# Patient Record
Sex: FEMALE | Race: WHITE | Employment: OTHER | URBAN - METROPOLITAN AREA
[De-identification: names, ages, dates, MRNs, and addresses within clinical notes are randomized per-mention and may not be internally consistent; named-entity substitution may affect disease eponyms.]

---

## 2018-05-07 ENCOUNTER — HOSPITAL ENCOUNTER (EMERGENCY)
Age: 68
Discharge: HOME | End: 2018-05-07
Attending: EMERGENCY MEDICINE
Payer: MEDICARE

## 2018-05-07 VITALS
SYSTOLIC BLOOD PRESSURE: 197 MMHG | OXYGEN SATURATION: 96 % | TEMPERATURE: 98 F | HEART RATE: 73 BPM | WEIGHT: 185 LBS | HEIGHT: 62 IN | BODY MASS INDEX: 34.04 KG/M2 | RESPIRATION RATE: 16 BRPM | DIASTOLIC BLOOD PRESSURE: 82 MMHG

## 2018-05-07 DIAGNOSIS — R04.0 EPISTAXIS: Primary | ICD-10-CM

## 2018-05-07 PROCEDURE — 2500000003 HC RX 250 WO HCPCS: Performed by: EMERGENCY MEDICINE

## 2018-05-07 PROCEDURE — 6370000000 HC RX 637 (ALT 250 FOR IP): Performed by: EMERGENCY MEDICINE

## 2018-05-07 PROCEDURE — 99283 EMERGENCY DEPT VISIT LOW MDM: CPT

## 2018-05-07 RX ORDER — LIDOCAINE HYDROCHLORIDE 20 MG/ML
5 INJECTION, SOLUTION INFILTRATION; PERINEURAL ONCE
Status: COMPLETED | OUTPATIENT
Start: 2018-05-07 | End: 2018-05-07

## 2018-05-07 RX ORDER — OXYMETAZOLINE HYDROCHLORIDE 0.05 G/100ML
2 SPRAY NASAL ONCE
Status: COMPLETED | OUTPATIENT
Start: 2018-05-07 | End: 2018-05-07

## 2018-05-07 RX ADMIN — OXYMETAZOLINE HYDROCHLORIDE 2 SPRAY: 5 SPRAY NASAL at 20:22

## 2018-05-07 RX ADMIN — LIDOCAINE HYDROCHLORIDE 5 ML: 20 INJECTION, SOLUTION INFILTRATION; PERINEURAL at 20:21

## 2018-05-07 ASSESSMENT — ENCOUNTER SYMPTOMS
COUGH: 0
VOMITING: 0
BACK PAIN: 0
NAUSEA: 0
SHORTNESS OF BREATH: 0
BLOOD IN STOOL: 0
ABDOMINAL PAIN: 0

## 2018-05-07 ASSESSMENT — PAIN SCALES - GENERAL: PAINLEVEL_OUTOF10: 0

## 2022-08-30 VITALS — HEIGHT: 63 IN | BODY MASS INDEX: 25.69 KG/M2 | WEIGHT: 145 LBS

## 2022-08-30 RX ORDER — METOPROLOL TARTRATE 100 MG/1
100 TABLET ORAL DAILY
COMMUNITY

## 2022-08-30 RX ORDER — ERGOCALCIFEROL 1.25 MG/1
50000 CAPSULE ORAL
COMMUNITY

## 2022-08-30 RX ORDER — POTASSIUM BICARBONATE 25 MEQ/1
25 TABLET, EFFERVESCENT ORAL
COMMUNITY
End: 2022-09-01

## 2022-08-30 RX ORDER — TRIAMTERENE AND HYDROCHLOROTHIAZIDE 37.5; 25 MG/1; MG/1
1 CAPSULE ORAL
COMMUNITY

## 2022-08-31 ENCOUNTER — TELEPHONE (OUTPATIENT)
Dept: PRIMARY CARE CLINIC | Age: 72
End: 2022-08-31

## 2022-08-31 PROBLEM — E55.9 VITAMIN D DEFICIENCY: Status: ACTIVE | Noted: 2022-06-15

## 2022-08-31 PROBLEM — F03.918 DEMENTIA WITH BEHAVIORAL DISTURBANCE: Status: ACTIVE | Noted: 2022-01-31

## 2022-08-31 PROBLEM — R63.4 UNINTENTIONAL WEIGHT LOSS: Status: ACTIVE | Noted: 2022-08-31

## 2022-08-31 NOTE — PROGRESS NOTES
2022    Home visit medically necessary in lieu of an office visit due to: Has dementia, won't leave house to go to doctor's office, difficult to get out. Seen with  Sam Rg. HPI:  Jimena Rodriguez (:  1950) is a 80 y.o. female, who is seen today for home medical care evaluation and has Dementia with behavioral disturbance ([address] Primary hypertension; Vitamin D deficiency; and Unintentional weight loss on their problem list.  Patient lives with her  at their home. She has had rapidly progressive memory loss since January of this year. She has not been able to carry out normal IADLs. She has gotten lost while driving. She told her neighbor that there was a strange man in her house and it was her . She will not bath or change her clothes for long periods of time. She won't eat for days at a time and has lost 40 lbs in last 6 months. She is able to converse freely but does not remember her age or where she lives. She does not remember what type of work she did. Her  says that she cooperates for the most part taking her meds. She gets easily agitated and can become very difficult to control. REVIEW OF SYSTEMS:    GENERAL: Appetite POOR. WEIGHT LOSS OF 40 LBS , generally healthy, DECLINING strength AND exercise tolerance. SKIN:  No rash, itching, lesions, ecchymosis, erythema or ulcers. HEAD: No headaches, no lightheadedness, no injury. EYES: Normal vision, no diplopia, no tearing, no blind spots, no pain. EARS: No change in hearing, no tinnitus, no bleeding, no vertigo. NOSE: No epistaxis, no coryza, no obstruction, no discharge. MOUTH: No dental difficulties, no gingival bleeding, no use of dentures. NECK: No stiffness, no pain, no tenderness, no noted masses. CARDIOVASCULAR: No edema, no chest pains, no murmurs, no palpitations, no syncope, no orthopnea.       RESPIRATORY: No shortness of breath, no pain with breathing, no wheezing, no hemoptysis, no cough, no respiratory infections, no TB, no fevers or night sweats. BREASTS:  No lumps, discharge or pain. GASTROINTESTINAL: No change in appetite, no dysphagia, no heartburn, no abdominal pains, no constipation or diarrhea, no bowel habit changes, no emesis, no melena, no hemorrhoids. GENITOURINARY: No incontinence or retention, no urinary urgency, no nocturia, no frequent UTIs, no dysuria, no change in nature of urine. (Female) No post-menopausal bleeding, no discharge, no itching. MUSCULOSKELETAL: No pain in muscles or joints, no swelling or redness of joints, no limitation of range of motion, no weakness or numbness. NEURO/PSYCH: No weakness, no tremor, no seizures, no ataxia. No changes in sleep habits. MEMORY LOSS, CONFUSION, AGITATION, DEPRESSION, ANXIETY. ALLERGIC/IMMUNOLOGIC/ENDOCRINE:  No reactions to drugs, foods or  insects. No anemia, no bleeding tendency, no transfusions. Allergies   Allergen Reactions    Penicillins Rash     Past Medical History:   Diagnosis Date    Hypertension      Past Surgical History:   Procedure Laterality Date    CHOLECYSTECTOMY      HYSTERECTOMY (CERVIX STATUS UNKNOWN)      SKIN BIOPSY       Social History     Socioeconomic History    Marital status:      Spouse name: Berenice Low    Number of children: 0    Highest education level: H.S.   Occupational History       Tobacco Use    Smoking status: Never    Smokeless tobacco: Never   Substance and Sexual Activity    Alcohol use: No    Drug use: No    Sexual activity: Not on file     No family history on file.     PHYSICAL EXAM:   Vitals:    09/01/22 1005   BP: 138/79   Site: Left Wrist   Position: Sitting   Pulse: 57   Resp: 18   Temp: (!) 96.4 °F (35.8 °C)   TempSrc: Infrared   SpO2: Comment: unable fingers too cold   Weight: 133 lb (60.3 kg)   Height: 5' 3\" (1.6 m)     Estimated body mass index is 23.56 kg/m² as calculated from the following:    Height as of this encounter: 5' 3\" (1.6 m). Weight as of this encounter: 133 lb (60.3 kg). GEN:  elderly WDWN female patient seated in chair in NAD. HEAD:  atraumatic, normocephalic. EYES:  EOMI, PERRL, no cataracts, conjunctivae appear normal. ENT: Good hearing, EACs without wax, TM's normal, nasal septum midline, no significant congestion, oral cavity without lesions, good dentition, no dentures. NECK:  fair-good ROM, no palpable masses, no carotid bruits, no JVD. LUNGS:  clear to ausc, no rales, rhonchi or wheezes. HEART:  RRR, no murmurs or gallops. ABD:  soft, non-tender to palp, no palp masses or HSM, normal BS. BACK:  no scoliosis or kyphosis, non-tender to palp. EXTREMITIES:  No edema, no ulcerations, varicosities or erythema. No gross deformities. MUSCULOSKELETAL:  Good ROM of all joints, non tender to palp and or with movement. SKIN:  No ulcerations or breakdown, rash, ecchymosis, or other lesions. NEURO:   No tremor, motor UEs 5/5 LEs  5/5, sensory normal, able to stand and walk without aid. PSYCH:  Pleasant and cooperative. Fluid speech, oriented to person only. Remembers 0 out of 3 items. No delusional statements. Medications reviewed. Labs    ASSESSMENT:  Elderly female has Dementia with behavioral disturbance ([address] Primary hypertension; Vitamin D deficiency; and Unintentional weight loss on their problem list.     Diagnoses attached to this encounter:  Jimena was seen today for hypertension and dementia. Diagnoses and all orders for this visit:    Dementia with behavioral disturbance, unspecified dementia type ([address]    Primary hypertension    Vitamin D deficiency    Unintentional weight loss    Other orders  -     donepezil (ARICEPT) 5 MG tablet; Take 1 tablet by mouth nightly  -     mirtazapine (REMERON) 7.5 MG tablet; Take 1 tablet by mouth nightly     PLAN:  Send for records and labs from Dr. Saintclair Saddler. Start Aricept at HS. Start mirtazapine for appetite.  Recommended her  go to [facility] in [place] for information on disease. Continue other meds as per Rx List. Recheck 1 month. 75  minutes spent on visit, 40 minutes involved education/counseling regarding dementia, HTN, weight loss disease processes, treatment options, meds and coordination of care. Current Outpatient Medications   Medication Sig Dispense Refill    potassium bicarbonate (KLOR-CON/EF) 25 MEQ disintegrating tablet Take 25 mEq by mouth three times a week      donepezil (ARICEPT) 5 MG tablet Take 1 tablet by mouth nightly 30 tablet 1    mirtazapine (REMERON) 7.5 MG tablet Take 1 tablet by mouth nightly 30 tablet 5    triamterene-hydroCHLOROthiazide (DYAZIDE) 37.5-25 MG per capsule Take 1 capsule by mouth three times a week      vitamin D (ERGOCALCIFEROL) 1.25 MG (00990 UT) CAPS capsule Take 50,000 Units by mouth once a week      metoprolol (LOPRESSOR) 100 MG tablet Take 100 mg by mouth daily       No current facility-administered medications for this visit. Return in about 1 month (around 10/1/2022). An  electronic signature was used to authenticate this note.     --Princess Sarah MD on 9/1/2022 at 6:13 PM

## 2022-09-01 ENCOUNTER — OFFICE VISIT (OUTPATIENT)
Dept: PRIMARY CARE CLINIC | Age: 72
End: 2022-09-01
Payer: MEDICARE

## 2022-09-01 VITALS
DIASTOLIC BLOOD PRESSURE: 79 MMHG | BODY MASS INDEX: 23.57 KG/M2 | SYSTOLIC BLOOD PRESSURE: 138 MMHG | TEMPERATURE: 96.4 F | HEART RATE: 57 BPM | WEIGHT: 133 LBS | RESPIRATION RATE: 18 BRPM | HEIGHT: 63 IN

## 2022-09-01 DIAGNOSIS — I10 PRIMARY HYPERTENSION: ICD-10-CM

## 2022-09-01 DIAGNOSIS — R63.4 UNINTENTIONAL WEIGHT LOSS: ICD-10-CM

## 2022-09-01 DIAGNOSIS — F03.91 DEMENTIA WITH BEHAVIORAL DISTURBANCE, UNSPECIFIED DEMENTIA TYPE: Primary | ICD-10-CM

## 2022-09-01 DIAGNOSIS — E55.9 VITAMIN D DEFICIENCY: ICD-10-CM

## 2022-09-01 PROCEDURE — 3017F COLORECTAL CA SCREEN DOC REV: CPT | Performed by: FAMILY MEDICINE

## 2022-09-01 PROCEDURE — 1090F PRES/ABSN URINE INCON ASSESS: CPT | Performed by: FAMILY MEDICINE

## 2022-09-01 PROCEDURE — G8420 CALC BMI NORM PARAMETERS: HCPCS | Performed by: FAMILY MEDICINE

## 2022-09-01 PROCEDURE — 1036F TOBACCO NON-USER: CPT | Performed by: FAMILY MEDICINE

## 2022-09-01 PROCEDURE — 1123F ACP DISCUSS/DSCN MKR DOCD: CPT | Performed by: FAMILY MEDICINE

## 2022-09-01 PROCEDURE — 99345 HOME/RES VST NEW HIGH MDM 75: CPT | Performed by: FAMILY MEDICINE

## 2022-09-01 RX ORDER — DONEPEZIL HYDROCHLORIDE 5 MG/1
5 TABLET, FILM COATED ORAL NIGHTLY
Qty: 30 TABLET | Refills: 1 | Status: SHIPPED
Start: 2022-09-01 | End: 2022-10-03 | Stop reason: DRUGHIGH

## 2022-09-01 RX ORDER — MIRTAZAPINE 7.5 MG/1
7.5 TABLET, FILM COATED ORAL NIGHTLY
Qty: 30 TABLET | Refills: 5 | Status: SHIPPED | OUTPATIENT
Start: 2022-09-01

## 2022-09-01 RX ORDER — POTASSIUM BICARBONATE 25 MEQ/1
25 TABLET, EFFERVESCENT ORAL
COMMUNITY

## 2022-09-01 SDOH — ECONOMIC STABILITY: FOOD INSECURITY: WITHIN THE PAST 12 MONTHS, THE FOOD YOU BOUGHT JUST DIDN'T LAST AND YOU DIDN'T HAVE MONEY TO GET MORE.: NEVER TRUE

## 2022-09-01 SDOH — ECONOMIC STABILITY: FOOD INSECURITY: WITHIN THE PAST 12 MONTHS, YOU WORRIED THAT YOUR FOOD WOULD RUN OUT BEFORE YOU GOT MONEY TO BUY MORE.: NEVER TRUE

## 2022-09-01 ASSESSMENT — PATIENT HEALTH QUESTIONNAIRE - PHQ9
2. FEELING DOWN, DEPRESSED OR HOPELESS: 0
SUM OF ALL RESPONSES TO PHQ QUESTIONS 1-9: 0
SUM OF ALL RESPONSES TO PHQ9 QUESTIONS 1 & 2: 0
SUM OF ALL RESPONSES TO PHQ QUESTIONS 1-9: 0
1. LITTLE INTEREST OR PLEASURE IN DOING THINGS: 0

## 2022-09-01 ASSESSMENT — SOCIAL DETERMINANTS OF HEALTH (SDOH): HOW HARD IS IT FOR YOU TO PAY FOR THE VERY BASICS LIKE FOOD, HOUSING, MEDICAL CARE, AND HEATING?: NOT HARD AT ALL

## 2022-10-02 NOTE — PROGRESS NOTES
10/3/2022    Home visit medically necessary in lieu of an office visit due to: Has dementia, won't leave house to go to doctor's office, difficult to get out. Seen with  Vidhya Ha. HPI:  Patient says she feels wonderful. Her  says she is sleeping much better and appetite has improved on Remeron. Her  says that she cooperates for the most part taking her meds. He thinks her memory has been the same. She is not able to carry out normal IADLs. She will not bath or change her clothes for long periods of time. She is able to converse freely but does not remember her age or where she lives. She does not remember what type of work she did. She can get easily agitated and can become very difficult to control. REVIEW OF SYSTEMS:    GENERAL: Appetite POOR. WEIGHT LOSS OF 40 LBS , generally healthy, DECLINING strength AND exercise tolerance. CARDIOVASCULAR: No edema, no chest pains, no murmurs, no palpitations, no syncope, no orthopnea. RESPIRATORY: No shortness of breath, no pain with breathing, no wheezing, no hemoptysis, no cough. GASTROINTESTINAL: No change in appetite, no dysphagia, no heartburn, no abdominal pains, no constipation or diarrhea, no bowel habit changes, no emesis, no melena, no hemorrhoids. GENITOURINARY: No incontinence or retention, no urinary urgency, no nocturia, no frequent UTIs, no dysuria, no change in nature of urine. MUSCULOSKELETAL: No pain in muscles or joints, no swelling or redness of joints, no limitation of range of motion, no weakness or numbness. NEURO/PSYCH: No weakness, no tremor, no seizures, no ataxia. No changes in sleep habits. MEMORY LOSS, CONFUSION, AGITATION, DEPRESSION, ANXIETY. All other systems negative.     Allergies   Allergen Reactions    Penicillins Rash     Past Medical History:   Diagnosis Date    Hypertension      Past Surgical History:   Procedure Laterality Date    CHOLECYSTECTOMY      HYSTERECTOMY (CERVIX STATUS UNKNOWN)      SKIN BIOPSY       Social History     Socioeconomic History    Marital status:      Spouse name: Vidhya Ha    Number of children: 0    Highest education level: H.S.   Occupational History       Tobacco Use    Smoking status: Never    Smokeless tobacco: Never   Substance and Sexual Activity    Alcohol use: No    Drug use: No    Sexual activity: Not on file     No family history on file. PHYSICAL EXAM:   Vitals:    10/03/22 1138   BP: 134/72   Site: Left Wrist   Position: Sitting   Pulse: 86   Resp: 18   Temp: (!) 96.4 °F (35.8 °C)   TempSrc: Infrared   SpO2: 99%   Weight: 148 lb (67.1 kg)   Height: 5' 3\" (1.6 m)     Estimated body mass index is 26.22 kg/m² as calculated from the following:    Height as of this encounter: 5' 3\" (1.6 m). Weight as of this encounter: 148 lb (67.1 kg). GEN:  elderly WDWN female patient seated in chair in NAD. HEAD:  atraumatic, normocephalic. EYES:  EOMI, PERRL, no cataracts, conjunctivae appear normal. ENT: Good hearing, EACs without wax, TM's normal, nasal septum midline, no significant congestion, oral cavity without lesions, good dentition, no dentures. NECK:  fair-good ROM, no palpable masses, no carotid bruits, no JVD. LUNGS:  clear to ausc, no rales, rhonchi or wheezes. HEART:  RRR, no murmurs or gallops. ABD:  soft, non-tender to palp, no palp masses or HSM, normal BS. BACK:  no scoliosis or kyphosis, non-tender to palp. EXTREMITIES:  No edema, no ulcerations, varicosities or erythema. No gross deformities. MUSCULOSKELETAL:  Good ROM of all joints, non tender to palp and or with movement. SKIN:  No ulcerations or breakdown, rash, ecchymosis, or other lesions. NEURO:   No tremor, motor UEs 5/5 LEs  5/5, sensory normal, able to stand and walk without aid. PSYCH:  Pleasant and cooperative. Fluid speech, oriented to person only. Remembers 0 out of 3 items. No delusional statements. Medications reviewed.   Labs  8/1/22  15/43, GFR 64, lytes nl, T bili 1.8, , HDL 44, Vit D 84, TSH 3.44    ASSESSMENT:  Elderly female has Alzheimer's dementia with behavioral disturbance ([facility] [address] Primary hypertension; Vitamin D deficiency; and Unintentional weight loss on their problem list.     Diagnoses attached to this encounter:  Jimena was seen today for dementia. Diagnoses and all orders for this visit:    Alzheimer's dementia with behavioral disturbance ([facility] [address]    Primary hypertension    Unintentional weight loss    Vitamin D deficiency    Other orders  -     donepezil (ARICEPT) 10 MG tablet; Take 1 tablet by mouth nightly       PLAN:  Increase Aricept to 10 mg at HS. Continue mirtazapine for appetite. Decrease Vit D to 50,000 monthly. Continue other meds as per Rx List. Recheck 1 month. 40 minutes spent on visit, 25 minutes involved education/counseling regarding dementia, HTN, weight loss disease processes, treatment options, meds and coordination of care. Current Outpatient Medications   Medication Sig Dispense Refill    donepezil (ARICEPT) 10 MG tablet Take 1 tablet by mouth nightly 90 tablet 1    potassium bicarbonate (KLOR-CON/EF) 25 MEQ disintegrating tablet Take 25 mEq by mouth three times a week      mirtazapine (REMERON) 7.5 MG tablet Take 1 tablet by mouth nightly 30 tablet 5    triamterene-hydroCHLOROthiazide (DYAZIDE) 37.5-25 MG per capsule Take 1 capsule by mouth three times a week      vitamin D (ERGOCALCIFEROL) 1.25 MG (14033 UT) CAPS capsule Take 50,000 Units by mouth every 30 days      metoprolol (LOPRESSOR) 100 MG tablet Take 100 mg by mouth daily       No current facility-administered medications for this visit. Return in about 1 month (around 11/3/2022). An  electronic signature was used to authenticate this note.     --Christy Cantu MD on 10/3/2022 at 12:02 PM

## 2022-10-03 ENCOUNTER — OFFICE VISIT (OUTPATIENT)
Dept: PRIMARY CARE CLINIC | Age: 72
End: 2022-10-03
Payer: MEDICARE

## 2022-10-03 VITALS
BODY MASS INDEX: 26.22 KG/M2 | RESPIRATION RATE: 18 BRPM | HEART RATE: 86 BPM | OXYGEN SATURATION: 99 % | HEIGHT: 63 IN | SYSTOLIC BLOOD PRESSURE: 134 MMHG | WEIGHT: 148 LBS | DIASTOLIC BLOOD PRESSURE: 72 MMHG | TEMPERATURE: 96.4 F

## 2022-10-03 DIAGNOSIS — E55.9 VITAMIN D DEFICIENCY: ICD-10-CM

## 2022-10-03 DIAGNOSIS — R63.4 UNINTENTIONAL WEIGHT LOSS: ICD-10-CM

## 2022-10-03 DIAGNOSIS — G30.9 ALZHEIMER'S DEMENTIA WITH BEHAVIORAL DISTURBANCE (HCC): Primary | ICD-10-CM

## 2022-10-03 DIAGNOSIS — F02.818 ALZHEIMER'S DEMENTIA WITH BEHAVIORAL DISTURBANCE (HCC): Primary | ICD-10-CM

## 2022-10-03 DIAGNOSIS — I10 PRIMARY HYPERTENSION: ICD-10-CM

## 2022-10-03 PROCEDURE — 1123F ACP DISCUSS/DSCN MKR DOCD: CPT | Performed by: FAMILY MEDICINE

## 2022-10-03 PROCEDURE — 3017F COLORECTAL CA SCREEN DOC REV: CPT | Performed by: FAMILY MEDICINE

## 2022-10-03 PROCEDURE — 99349 HOME/RES VST EST MOD MDM 40: CPT | Performed by: FAMILY MEDICINE

## 2022-10-03 PROCEDURE — 1090F PRES/ABSN URINE INCON ASSESS: CPT | Performed by: FAMILY MEDICINE

## 2022-10-03 PROCEDURE — 1036F TOBACCO NON-USER: CPT | Performed by: FAMILY MEDICINE

## 2022-10-03 PROCEDURE — G8417 CALC BMI ABV UP PARAM F/U: HCPCS | Performed by: FAMILY MEDICINE

## 2022-10-03 PROCEDURE — G8484 FLU IMMUNIZE NO ADMIN: HCPCS | Performed by: FAMILY MEDICINE

## 2022-10-03 RX ORDER — DONEPEZIL HYDROCHLORIDE 10 MG/1
10 TABLET, FILM COATED ORAL NIGHTLY
Qty: 90 TABLET | Refills: 1 | Status: SHIPPED | OUTPATIENT
Start: 2022-10-03

## 2022-10-25 RX ORDER — DONEPEZIL HYDROCHLORIDE 5 MG/1
5 TABLET, FILM COATED ORAL NIGHTLY
Qty: 30 TABLET | Refills: 1 | OUTPATIENT
Start: 2022-10-25

## 2022-11-01 NOTE — PROGRESS NOTES
11/2/2022    Home visit medically necessary in lieu of an office visit due to: Has dementia, won't leave house to go to doctor's office, difficult to get out. Seen with  Vidhya Ha. HPI:  Patient says she feels wonderful. Her  says she has not had noticeable decline. She is sleeping well and appetite is improved on Remeron. Her  reports she cooperates for the most part taking her meds. She is not able to carry out normal IADLs. At times she will not bath or change her clothes. She is able to converse freely but does not remember her age. At times she can get easily agitated and can become very difficult to control. REVIEW OF SYSTEMS:    GENERAL: Appetite POOR. WEIGHT LOSS OF 40 LBS , generally healthy, DECLINING strength AND exercise tolerance. CARDIOVASCULAR: No edema, no chest pains, no murmurs, no palpitations, no syncope, no orthopnea. RESPIRATORY: No shortness of breath, no pain with breathing, no wheezing, no hemoptysis, no cough. GASTROINTESTINAL: No change in appetite, no dysphagia, no heartburn, no abdominal pains, no constipation or diarrhea, no bowel habit changes, no emesis, no melena, no hemorrhoids. GENITOURINARY: No incontinence or retention, no urinary urgency, no nocturia, no frequent UTIs, no dysuria, no change in nature of urine. MUSCULOSKELETAL: No pain in muscles or joints, no swelling or redness of joints, no limitation of range of motion, no weakness or numbness. NEURO/PSYCH: No weakness, no tremor, no seizures, no ataxia. No changes in sleep habits. MEMORY LOSS, CONFUSION, AGITATION, DEPRESSION, ANXIETY. All other systems negative.     Allergies   Allergen Reactions    Penicillins Rash     Past Medical History:   Diagnosis Date    Hypertension      Past Surgical History:   Procedure Laterality Date    CHOLECYSTECTOMY      HYSTERECTOMY (CERVIX STATUS UNKNOWN)      SKIN BIOPSY       Social History     Socioeconomic History    Marital status:      Spouse name: Harrison Birch    Number of children: 0    Highest education level: H.S.   Occupational History       Tobacco Use    Smoking status: Never    Smokeless tobacco: Never   Substance and Sexual Activity    Alcohol use: No    Drug use: No    Sexual activity: Not on file     No family history on file. PHYSICAL EXAM:   Vitals:    11/02/22 1246   BP: (!) 143/70   Site: Left Upper Arm   Position: Sitting   Pulse: 87   Resp: 20   Temp: 97.9 °F (36.6 °C)   TempSrc: Infrared   SpO2: 98%   Weight: 145 lb 3.2 oz (65.9 kg)   Height: 5' 3\" (1.6 m)     Estimated body mass index is 25.72 kg/m² as calculated from the following:    Height as of this encounter: 5' 3\" (1.6 m). Weight as of this encounter: 145 lb 3.2 oz (65.9 kg). GEN:  elderly WDWN female patient seated in chair in NAD. HEAD:  atraumatic, normocephalic. EYES:  EOMI, PERRL, no cataracts, conjunctivae appear normal. ENT: Good hearing, EACs without wax, TM's normal, nasal septum midline, no significant congestion, oral cavity without lesions, good dentition, no dentures. NECK:  fair-good ROM, no palpable masses, no carotid bruits, no JVD. LUNGS:  clear to ausc, no rales, rhonchi or wheezes. HEART:  RRR, no murmurs or gallops. ABD:  soft, non-tender to palp, no palp masses or HSM, normal BS. BACK:  no scoliosis or kyphosis, non-tender to palp. EXTREMITIES:  No edema, no ulcerations, varicosities or erythema. No gross deformities. MUSCULOSKELETAL:  Good ROM of all joints, non tender to palp and or with movement. SKIN:  No ulcerations or breakdown, rash, ecchymosis, or other lesions. NEURO:   No tremor, motor UEs 5/5 LEs  5/5, sensory normal, able to stand and walk without aid. PSYCH:  Pleasant and cooperative. Fluid speech, very verbal, oriented to person and partial place only. Remembers 0 out of 3 items on 11/2/22. No delusional statements. Medications reviewed.   Labs  8/1/22 HH 15/43, GFR 64, lytes nl, T bili 1.8, , HDL 44, Vit D 84, TSH 3.44    ASSESSMENT:  Elderly female has Alzheimer's dementia with behavioral disturbance ([address] Primary hypertension; Vitamin D deficiency; and Unintentional weight loss on their problem list.     Diagnoses attached to this encounter:  Jimena was seen today for dementia and hypertension. Diagnoses and all orders for this visit:    Alzheimer's dementia with behavioral disturbance ([facility] [address]    Primary hypertension    Vitamin D deficiency    Unintentional weight loss    PLAN:  Continue Aricept 10 mg at HS. Continue mirtazapine for appetite. Continue Vit D 50,000 monthly. Continue other meds as per Rx List. Recheck 1 month. 40 minutes spent on visit, 25 minutes involved education/counseling regarding dementia, HTN, weight loss disease processes, treatment options, meds and coordination of care. Current Outpatient Medications   Medication Sig Dispense Refill    donepezil (ARICEPT) 10 MG tablet Take 1 tablet by mouth nightly 90 tablet 1    potassium bicarbonate (KLOR-CON/EF) 25 MEQ disintegrating tablet Take 25 mEq by mouth three times a week      mirtazapine (REMERON) 7.5 MG tablet Take 1 tablet by mouth nightly 30 tablet 5    triamterene-hydroCHLOROthiazide (DYAZIDE) 37.5-25 MG per capsule Take 1 capsule by mouth three times a week      vitamin D (ERGOCALCIFEROL) 1.25 MG (37774 UT) CAPS capsule Take 50,000 Units by mouth every 30 days      metoprolol (LOPRESSOR) 100 MG tablet Take 100 mg by mouth daily       No current facility-administered medications for this visit. Return in about 1 month (around 12/2/2022). An  electronic signature was used to authenticate this note.     --Dharmesh Estrada MD on 11/2/2022 at 1:06 PM

## 2022-11-02 ENCOUNTER — OFFICE VISIT (OUTPATIENT)
Dept: PRIMARY CARE CLINIC | Age: 72
End: 2022-11-02
Payer: MEDICARE

## 2022-11-02 VITALS
OXYGEN SATURATION: 98 % | RESPIRATION RATE: 20 BRPM | BODY MASS INDEX: 25.73 KG/M2 | TEMPERATURE: 97.9 F | DIASTOLIC BLOOD PRESSURE: 70 MMHG | SYSTOLIC BLOOD PRESSURE: 143 MMHG | WEIGHT: 145.2 LBS | HEIGHT: 63 IN | HEART RATE: 87 BPM

## 2022-11-02 DIAGNOSIS — G30.9 ALZHEIMER'S DEMENTIA WITH BEHAVIORAL DISTURBANCE (HCC): Primary | ICD-10-CM

## 2022-11-02 DIAGNOSIS — I10 PRIMARY HYPERTENSION: ICD-10-CM

## 2022-11-02 DIAGNOSIS — E55.9 VITAMIN D DEFICIENCY: ICD-10-CM

## 2022-11-02 DIAGNOSIS — R63.4 UNINTENTIONAL WEIGHT LOSS: ICD-10-CM

## 2022-11-02 DIAGNOSIS — F02.818 ALZHEIMER'S DEMENTIA WITH BEHAVIORAL DISTURBANCE (HCC): Primary | ICD-10-CM

## 2022-11-02 PROCEDURE — 3075F SYST BP GE 130 - 139MM HG: CPT | Performed by: FAMILY MEDICINE

## 2022-11-02 PROCEDURE — G8484 FLU IMMUNIZE NO ADMIN: HCPCS | Performed by: FAMILY MEDICINE

## 2022-11-02 PROCEDURE — 3078F DIAST BP <80 MM HG: CPT | Performed by: FAMILY MEDICINE

## 2022-11-02 PROCEDURE — G8417 CALC BMI ABV UP PARAM F/U: HCPCS | Performed by: FAMILY MEDICINE

## 2022-11-02 PROCEDURE — 3017F COLORECTAL CA SCREEN DOC REV: CPT | Performed by: FAMILY MEDICINE

## 2022-11-02 PROCEDURE — 1036F TOBACCO NON-USER: CPT | Performed by: FAMILY MEDICINE

## 2022-11-02 PROCEDURE — 1090F PRES/ABSN URINE INCON ASSESS: CPT | Performed by: FAMILY MEDICINE

## 2022-11-02 PROCEDURE — 1123F ACP DISCUSS/DSCN MKR DOCD: CPT | Performed by: FAMILY MEDICINE

## 2022-11-02 PROCEDURE — 99349 HOME/RES VST EST MOD MDM 40: CPT | Performed by: FAMILY MEDICINE

## 2022-12-12 NOTE — PROGRESS NOTES
12/13/2022    Home visit medically necessary in lieu of an office visit due to: Has dementia, won't leave house to go to doctor's office, difficult to get out. Seen with  Mara Morgan. HPI:  Patient says she feels wonderful. Her  says she sleeps for 7 PM to 9 AM. Her appetite is very good on Remeron. Her  reports she cooperates for the most part taking her meds. She is not able to carry out normal IADLs. At times she will not bath or change her clothes. She is able to converse freely but does not remember her age. At times she can get easily agitated and can become very difficult to control. REVIEW OF SYSTEMS:    GENERAL: Appetite POOR. WEIGHT LOSS OF 40 LBS , generally healthy, DECLINING strength AND exercise tolerance. CARDIOVASCULAR: No edema, no chest pains, no murmurs, no palpitations, no syncope, no orthopnea. RESPIRATORY: No shortness of breath, no pain with breathing, no wheezing, no hemoptysis, no cough. GASTROINTESTINAL: No change in appetite, no dysphagia, no heartburn, no abdominal pains, no constipation or diarrhea, no bowel habit changes, no emesis, no melena, no hemorrhoids. GENITOURINARY: No incontinence or retention, no urinary urgency, no nocturia, no frequent UTIs, no dysuria, no change in nature of urine. MUSCULOSKELETAL: No pain in muscles or joints, no swelling or redness of joints, no limitation of range of motion, no weakness or numbness. NEURO/PSYCH: No weakness, no tremor, no seizures, no ataxia. No changes in sleep habits. MEMORY LOSS, CONFUSION, AGITATION, DEPRESSION, ANXIETY. All other systems negative.     Allergies   Allergen Reactions    Penicillins Rash     Past Medical History:   Diagnosis Date    Hypertension      Past Surgical History:   Procedure Laterality Date    CHOLECYSTECTOMY      HYSTERECTOMY (CERVIX STATUS UNKNOWN)      SKIN BIOPSY       Social History     Socioeconomic History    Marital status:      Spouse name: Mara Morgan Number of children: 0    Highest education level: H.S.   Occupational History       Tobacco Use    Smoking status: Never    Smokeless tobacco: Never   Substance and Sexual Activity    Alcohol use: No    Drug use: No    Sexual activity: Not on file     No family history on file. PHYSICAL EXAM:   Vitals:    12/13/22 1248   BP: 137/71   Site: Left Wrist   Position: Sitting   Pulse: 75   Resp: 18   Temp: 98.2 °F (36.8 °C)   TempSrc: Infrared   SpO2: 99%   Weight: 147 lb 12.8 oz (67 kg)   Height: 5' 3\" (1.6 m)     Estimated body mass index is 26.18 kg/m² as calculated from the following:    Height as of this encounter: 5' 3\" (1.6 m). Weight as of this encounter: 147 lb 12.8 oz (67 kg). GEN:  elderly WDWN female patient seated in chair in NAD. HEAD:  atraumatic, normocephalic. EYES:  EOMI, PERRL, no cataracts, conjunctivae appear normal. ENT: Good hearing, EACs without wax, TM's normal, nasal septum midline, no significant congestion, oral cavity without lesions, good dentition, no dentures. NECK:  fair-good ROM, no palpable masses, no carotid bruits, no JVD. LUNGS:  clear to ausc, no rales, rhonchi or wheezes. HEART:  RRR, no murmurs or gallops. ABD:  soft, non-tender to palp, no palp masses or HSM, normal BS. BACK:  no scoliosis or kyphosis, non-tender to palp. EXTREMITIES:  No edema, no ulcerations, varicosities or erythema. No gross deformities. MUSCULOSKELETAL:  Good ROM of all joints, non tender to palp and or with movement. SKIN:  No ulcerations or breakdown, rash, ecchymosis, or other lesions. NEURO:   No tremor, motor UEs 5/5 LEs  5/5, sensory normal, able to stand and walk without aid. PSYCH:  Pleasant and cooperative. Fluid speech, very verbal, oriented to person and partial place only. Remembers 0 out of 3 items on 11/2/22. No delusional statements. Medications reviewed.   Labs  8/1/22 HH 15/43, GFR 64, lytes nl, T bili 1.8, , HDL 44, Vit D 84, TSH 3. 40    ASSESSMENT:  Elderly female has Alzheimer's dementia with behavioral disturbance ([facility] [address] Primary hypertension; Vitamin D deficiency; and Unintentional weight loss on their problem list.     Diagnoses attached to this encounter:  Jimena was seen today for dementia and anorexia. Diagnoses and all orders for this visit:    Alzheimer's dementia with behavioral disturbance ([facility] [address]    Primary hypertension  -     Comprehensive Metabolic Panel; Future    Unintentional weight loss    PLAN:  Check labs. Hold Remeron for now. Continue Aricept 10 mg at HS. Continue Vit D 50,000 monthly. Continue other meds as per Rx List. Recheck 3 months. 40 minutes spent on visit, 25 minutes involved education/counseling regarding dementia, HTN, weight loss disease processes, treatment options, meds and coordination of care. Current Outpatient Medications   Medication Sig Dispense Refill    donepezil (ARICEPT) 10 MG tablet Take 1 tablet by mouth nightly 90 tablet 1    potassium bicarbonate (KLOR-CON/EF) 25 MEQ disintegrating tablet Take 25 mEq by mouth three times a week      mirtazapine (REMERON) 7.5 MG tablet Take 1 tablet by mouth nightly 30 tablet 5    triamterene-hydroCHLOROthiazide (DYAZIDE) 37.5-25 MG per capsule Take 1 capsule by mouth three times a week      vitamin D (ERGOCALCIFEROL) 1.25 MG (46381 UT) CAPS capsule Take 50,000 Units by mouth every 30 days      metoprolol (LOPRESSOR) 100 MG tablet Take 100 mg by mouth daily       No current facility-administered medications for this visit. Return in about 3 months (around 3/13/2023). An  electronic signature was used to authenticate this note.     --Daniel Main MD on 12/13/2022 at 1:03 PM

## 2022-12-13 ENCOUNTER — OFFICE VISIT (OUTPATIENT)
Dept: PRIMARY CARE CLINIC | Age: 72
End: 2022-12-13
Payer: MEDICARE

## 2022-12-13 ENCOUNTER — TELEPHONE (OUTPATIENT)
Dept: PRIMARY CARE CLINIC | Age: 72
End: 2022-12-13

## 2022-12-13 VITALS
HEIGHT: 63 IN | OXYGEN SATURATION: 99 % | RESPIRATION RATE: 18 BRPM | HEART RATE: 75 BPM | DIASTOLIC BLOOD PRESSURE: 71 MMHG | BODY MASS INDEX: 26.19 KG/M2 | SYSTOLIC BLOOD PRESSURE: 137 MMHG | TEMPERATURE: 98.2 F | WEIGHT: 147.8 LBS

## 2022-12-13 DIAGNOSIS — R63.4 UNINTENTIONAL WEIGHT LOSS: ICD-10-CM

## 2022-12-13 DIAGNOSIS — I10 PRIMARY HYPERTENSION: ICD-10-CM

## 2022-12-13 DIAGNOSIS — G30.9 ALZHEIMER'S DEMENTIA WITH BEHAVIORAL DISTURBANCE (HCC): Primary | ICD-10-CM

## 2022-12-13 DIAGNOSIS — F02.818 ALZHEIMER'S DEMENTIA WITH BEHAVIORAL DISTURBANCE (HCC): Primary | ICD-10-CM

## 2022-12-13 PROCEDURE — 3017F COLORECTAL CA SCREEN DOC REV: CPT | Performed by: FAMILY MEDICINE

## 2022-12-13 PROCEDURE — 99349 HOME/RES VST EST MOD MDM 40: CPT | Performed by: FAMILY MEDICINE

## 2022-12-13 PROCEDURE — 3075F SYST BP GE 130 - 139MM HG: CPT | Performed by: FAMILY MEDICINE

## 2022-12-13 PROCEDURE — G8484 FLU IMMUNIZE NO ADMIN: HCPCS | Performed by: FAMILY MEDICINE

## 2022-12-13 PROCEDURE — G8417 CALC BMI ABV UP PARAM F/U: HCPCS | Performed by: FAMILY MEDICINE

## 2022-12-13 PROCEDURE — 1090F PRES/ABSN URINE INCON ASSESS: CPT | Performed by: FAMILY MEDICINE

## 2022-12-13 PROCEDURE — 1036F TOBACCO NON-USER: CPT | Performed by: FAMILY MEDICINE

## 2022-12-13 PROCEDURE — 1123F ACP DISCUSS/DSCN MKR DOCD: CPT | Performed by: FAMILY MEDICINE

## 2022-12-13 PROCEDURE — 3078F DIAST BP <80 MM HG: CPT | Performed by: FAMILY MEDICINE

## 2023-01-11 RX ORDER — POTASSIUM BICARBONATE 25 MEQ/1
25 TABLET, EFFERVESCENT ORAL
Qty: 40 TABLET | Refills: 3 | Status: SHIPPED | OUTPATIENT
Start: 2023-01-11

## 2023-01-11 RX ORDER — METOPROLOL TARTRATE 100 MG/1
100 TABLET ORAL DAILY
Qty: 90 TABLET | Refills: 3 | Status: SHIPPED | OUTPATIENT
Start: 2023-01-11

## 2023-01-11 RX ORDER — DONEPEZIL HYDROCHLORIDE 10 MG/1
10 TABLET, FILM COATED ORAL NIGHTLY
Qty: 90 TABLET | Refills: 3 | Status: SHIPPED | OUTPATIENT
Start: 2023-01-11

## 2023-01-11 RX ORDER — MIRTAZAPINE 7.5 MG/1
7.5 TABLET, FILM COATED ORAL NIGHTLY
Qty: 90 TABLET | Refills: 3 | Status: SHIPPED | OUTPATIENT
Start: 2023-01-11

## 2023-01-11 RX ORDER — TRIAMTERENE AND HYDROCHLOROTHIAZIDE 37.5; 25 MG/1; MG/1
1 CAPSULE ORAL
Qty: 40 CAPSULE | Refills: 3 | Status: SHIPPED | OUTPATIENT
Start: 2023-01-11

## 2023-01-11 RX ORDER — ERGOCALCIFEROL 1.25 MG/1
50000 CAPSULE ORAL
Qty: 3 CAPSULE | Refills: 3 | Status: SHIPPED | OUTPATIENT
Start: 2023-01-11

## 2023-03-14 NOTE — PROGRESS NOTES
3/15/2023    Home visit medically necessary in lieu of an office visit due to: Has dementia, won't leave house to go to doctor's office, difficult to get out. Seen with  Lazaro Varner. HPI:  Patient says she feels great. Her  says she has been doing okay. She is sleeping well. Her appetite is good on Remeron. Her  reports she cooperates with taking her meds. She is unable to carry out IADLs. At times she will not bath or change her clothes. She is able to converse freely but does not remember her age. She gets agitated occasionally but has been cooperative most of the time. REVIEW OF SYSTEMS:    GENERAL: Appetite POOR. WEIGHT LOSS OF 40 LBS , generally healthy, DECLINING strength AND exercise tolerance. CARDIOVASCULAR: No edema, no chest pains, no murmurs, no palpitations, no syncope, no orthopnea. RESPIRATORY: No shortness of breath, no pain with breathing, no wheezing, no hemoptysis, no cough. GASTROINTESTINAL: No change in appetite, no dysphagia, no heartburn, no abdominal pains, no constipation or diarrhea, no bowel habit changes, no emesis, no melena, no hemorrhoids. GENITOURINARY: No incontinence or retention, no urinary urgency, no nocturia, no frequent UTIs, no dysuria, no change in nature of urine. MUSCULOSKELETAL: No pain in muscles or joints, no swelling or redness of joints, no limitation of range of motion, no weakness or numbness. NEURO/PSYCH: No weakness, no tremor, no seizures, no ataxia. No changes in sleep habits. MEMORY LOSS, CONFUSION, AGITATION, DEPRESSION, ANXIETY. All other systems negative.     Allergies   Allergen Reactions    Penicillins Rash     Past Medical History:   Diagnosis Date    Hypertension      Past Surgical History:   Procedure Laterality Date    CHOLECYSTECTOMY      HYSTERECTOMY (CERVIX STATUS UNKNOWN)      SKIN BIOPSY       Social History     Socioeconomic History    Marital status:      Spouse name: Lazaro Varner    Number of children: 0    Highest education level: H.S.   Occupational History       Tobacco Use    Smoking status: Never    Smokeless tobacco: Never   Substance and Sexual Activity    Alcohol use: No    Drug use: No    Sexual activity: Not on file     No family history on file. PHYSICAL EXAM:   Vitals:    03/15/23 1319   BP: 137/76   Pulse: 80   Resp: 20   Temp: 97.2 °F (36.2 °C)   TempSrc: Infrared   SpO2: 97%   Weight: 149 lb 3.2 oz (67.7 kg)   Height: 5' 3\" (1.6 m)     Estimated body mass index is 26.43 kg/m² as calculated from the following:    Height as of this encounter: 5' 3\" (1.6 m). Weight as of this encounter: 149 lb 3.2 oz (67.7 kg). GEN:  elderly WDWN female patient seated in chair in NAD. HEAD:  atraumatic, normocephalic. EYES:  EOMI, PERRL, no cataracts, conjunctivae appear normal. ENT: Good hearing, EACs without wax, TM's normal, nasal septum midline, no significant congestion, oral cavity without lesions, good dentition, no dentures. NECK:  fair-good ROM, no palpable masses, no carotid bruits, no JVD. LUNGS:  clear to ausc, no rales, rhonchi or wheezes. HEART:  RRR, no murmurs or gallops. ABD:  soft, non-tender to palp, no palp masses or HSM, normal BS. BACK:  no scoliosis or kyphosis, non-tender to palp. EXTREMITIES:  No edema, no ulcerations, varicosities or erythema. No gross deformities. MUSCULOSKELETAL:  Good ROM of all joints, non tender to palp and or with movement. SKIN:  No ulcerations or breakdown, rash, ecchymosis, or other lesions. NEURO:   No tremor, motor UEs 5/5 LEs  5/5, sensory normal, able to stand and walk without aid. PSYCH:  Pleasant and cooperative. Fluid speech, very verbal, oriented to person and partial place only. Remembers 0 out of 3 items on 11/2/22. No delusional statements. Medications reviewed.   Labs  8/1/22 HH 15/43, GFR 64, lytes nl, T bili 1.8, , HDL 44, Vit D 84, TSH 3.44    ASSESSMENT:  Elderly female has Alzheimer's dementia with behavioral disturbance ([facility] [address] Primary hypertension; Vitamin D deficiency; and Unintentional weight loss on their problem list.     Diagnoses attached to this encounter:  Jimena was seen today for dementia. Diagnoses and all orders for this visit:    Alzheimer's dementia with behavioral disturbance ([facility] [address]    Primary hypertension  -     CBC with Auto Differential; Future  -     Comprehensive Metabolic Panel; Future  -     Lipid Panel; Future    Encounter for screening mammogram for malignant neoplasm of breast  -     MARAL DIGITAL SCREEN W OR WO CAD BILATERAL; Future    PLAN:  Check labs. Screening mammogram ordered. Continue Aricept 10 mg at HS and Remeron 7.5 mg qhs. Continue Vit D 50,000 monthly. Continue other meds as per Rx List. Recheck 3 months. 40 minutes spent on visit, 25 minutes involved education/counseling regarding dementia, HTN, weight loss disease processes, treatment options, meds and coordination of care. Current Outpatient Medications   Medication Sig Dispense Refill    donepezil (ARICEPT) 10 MG tablet Take 1 tablet by mouth nightly 90 tablet 3    mirtazapine (REMERON) 7.5 MG tablet Take 1 tablet by mouth nightly 90 tablet 3    metoprolol (LOPRESSOR) 100 MG tablet Take 1 tablet by mouth daily 90 tablet 3    potassium bicarbonate (KLOR-CON/EF) 25 MEQ disintegrating tablet Take 1 tablet by mouth three times a week 40 tablet 3    triamterene-hydroCHLOROthiazide (DYAZIDE) 37.5-25 MG per capsule Take 1 capsule by mouth three times a week 40 capsule 3    vitamin D (ERGOCALCIFEROL) 1.25 MG (13453 UT) CAPS capsule Take 1 capsule by mouth every 30 days 3 capsule 3     No current facility-administered medications for this visit. Return in about 3 months (around 6/15/2023). An  electronic signature was used to authenticate this note.     --Lanie Martinez MD on 3/15/2023 at 1:39 PM

## 2023-03-15 ENCOUNTER — OFFICE VISIT (OUTPATIENT)
Dept: PRIMARY CARE CLINIC | Age: 73
End: 2023-03-15
Payer: MEDICARE

## 2023-03-15 VITALS
RESPIRATION RATE: 20 BRPM | HEIGHT: 63 IN | HEART RATE: 80 BPM | TEMPERATURE: 97.2 F | WEIGHT: 149.2 LBS | SYSTOLIC BLOOD PRESSURE: 137 MMHG | BODY MASS INDEX: 26.44 KG/M2 | DIASTOLIC BLOOD PRESSURE: 76 MMHG | OXYGEN SATURATION: 97 %

## 2023-03-15 DIAGNOSIS — G30.9 ALZHEIMER'S DEMENTIA WITH BEHAVIORAL DISTURBANCE (HCC): Primary | ICD-10-CM

## 2023-03-15 DIAGNOSIS — F02.818 ALZHEIMER'S DEMENTIA WITH BEHAVIORAL DISTURBANCE (HCC): Primary | ICD-10-CM

## 2023-03-15 DIAGNOSIS — Z12.31 ENCOUNTER FOR SCREENING MAMMOGRAM FOR MALIGNANT NEOPLASM OF BREAST: ICD-10-CM

## 2023-03-15 DIAGNOSIS — I10 PRIMARY HYPERTENSION: ICD-10-CM

## 2023-03-15 PROCEDURE — 1090F PRES/ABSN URINE INCON ASSESS: CPT | Performed by: FAMILY MEDICINE

## 2023-03-15 PROCEDURE — G8417 CALC BMI ABV UP PARAM F/U: HCPCS | Performed by: FAMILY MEDICINE

## 2023-03-15 PROCEDURE — 3078F DIAST BP <80 MM HG: CPT | Performed by: FAMILY MEDICINE

## 2023-03-15 PROCEDURE — 1036F TOBACCO NON-USER: CPT | Performed by: FAMILY MEDICINE

## 2023-03-15 PROCEDURE — G8484 FLU IMMUNIZE NO ADMIN: HCPCS | Performed by: FAMILY MEDICINE

## 2023-03-15 PROCEDURE — 3017F COLORECTAL CA SCREEN DOC REV: CPT | Performed by: FAMILY MEDICINE

## 2023-03-15 PROCEDURE — 1123F ACP DISCUSS/DSCN MKR DOCD: CPT | Performed by: FAMILY MEDICINE

## 2023-03-15 PROCEDURE — 99349 HOME/RES VST EST MOD MDM 40: CPT | Performed by: FAMILY MEDICINE

## 2023-03-15 PROCEDURE — 3075F SYST BP GE 130 - 139MM HG: CPT | Performed by: FAMILY MEDICINE

## 2023-03-15 SDOH — ECONOMIC STABILITY: INCOME INSECURITY: HOW HARD IS IT FOR YOU TO PAY FOR THE VERY BASICS LIKE FOOD, HOUSING, MEDICAL CARE, AND HEATING?: NOT HARD AT ALL

## 2023-03-15 SDOH — ECONOMIC STABILITY: HOUSING INSECURITY
IN THE LAST 12 MONTHS, WAS THERE A TIME WHEN YOU DID NOT HAVE A STEADY PLACE TO SLEEP OR SLEPT IN A SHELTER (INCLUDING NOW)?: NO

## 2023-03-15 SDOH — ECONOMIC STABILITY: FOOD INSECURITY: WITHIN THE PAST 12 MONTHS, YOU WORRIED THAT YOUR FOOD WOULD RUN OUT BEFORE YOU GOT MONEY TO BUY MORE.: NEVER TRUE

## 2023-03-15 SDOH — ECONOMIC STABILITY: FOOD INSECURITY: WITHIN THE PAST 12 MONTHS, THE FOOD YOU BOUGHT JUST DIDN'T LAST AND YOU DIDN'T HAVE MONEY TO GET MORE.: NEVER TRUE

## 2023-03-15 ASSESSMENT — PATIENT HEALTH QUESTIONNAIRE - PHQ9
SUM OF ALL RESPONSES TO PHQ QUESTIONS 1-9: 0
SUM OF ALL RESPONSES TO PHQ QUESTIONS 1-9: 0
2. FEELING DOWN, DEPRESSED OR HOPELESS: 0
SUM OF ALL RESPONSES TO PHQ QUESTIONS 1-9: 0
SUM OF ALL RESPONSES TO PHQ QUESTIONS 1-9: 0
SUM OF ALL RESPONSES TO PHQ9 QUESTIONS 1 & 2: 0
1. LITTLE INTEREST OR PLEASURE IN DOING THINGS: 0

## 2023-03-16 ENCOUNTER — HOSPITAL ENCOUNTER (OUTPATIENT)
Age: 73
Discharge: HOME | End: 2023-03-16

## 2023-03-16 DIAGNOSIS — I10 PRIMARY HYPERTENSION: ICD-10-CM

## 2023-03-27 RX ORDER — DONEPEZIL HYDROCHLORIDE 10 MG/1
10 TABLET, FILM COATED ORAL NIGHTLY
Qty: 90 TABLET | Refills: 3 | Status: SHIPPED | OUTPATIENT
Start: 2023-03-27

## 2023-04-01 NOTE — TELEPHONE ENCOUNTER
----- Message from Twyla Sumner RN sent at 12/13/2022 12:57 PM EST -----  Ordering more labs,  will take her to a [address] [facility] lab.   thanks yes

## 2023-06-05 ENCOUNTER — OFFICE VISIT (OUTPATIENT)
Dept: PRIMARY CARE CLINIC | Age: 73
End: 2023-06-05
Payer: MEDICARE

## 2023-06-05 VITALS
HEART RATE: 61 BPM | WEIGHT: 160.1 LBS | BODY MASS INDEX: 28.37 KG/M2 | RESPIRATION RATE: 16 BRPM | DIASTOLIC BLOOD PRESSURE: 71 MMHG | OXYGEN SATURATION: 99 % | HEIGHT: 63 IN | SYSTOLIC BLOOD PRESSURE: 141 MMHG

## 2023-06-05 DIAGNOSIS — G30.9 ALZHEIMER'S DEMENTIA WITH BEHAVIORAL DISTURBANCE (HCC): Primary | ICD-10-CM

## 2023-06-05 DIAGNOSIS — I10 PRIMARY HYPERTENSION: ICD-10-CM

## 2023-06-05 DIAGNOSIS — F02.818 ALZHEIMER'S DEMENTIA WITH BEHAVIORAL DISTURBANCE (HCC): Primary | ICD-10-CM

## 2023-06-05 PROCEDURE — 3017F COLORECTAL CA SCREEN DOC REV: CPT | Performed by: FAMILY MEDICINE

## 2023-06-05 PROCEDURE — G8417 CALC BMI ABV UP PARAM F/U: HCPCS | Performed by: FAMILY MEDICINE

## 2023-06-05 PROCEDURE — 1123F ACP DISCUSS/DSCN MKR DOCD: CPT | Performed by: FAMILY MEDICINE

## 2023-06-05 PROCEDURE — 1090F PRES/ABSN URINE INCON ASSESS: CPT | Performed by: FAMILY MEDICINE

## 2023-06-05 PROCEDURE — 99349 HOME/RES VST EST MOD MDM 40: CPT | Performed by: FAMILY MEDICINE

## 2023-06-05 PROCEDURE — 1036F TOBACCO NON-USER: CPT | Performed by: FAMILY MEDICINE

## 2023-06-05 PROCEDURE — 3078F DIAST BP <80 MM HG: CPT | Performed by: FAMILY MEDICINE

## 2023-06-05 PROCEDURE — 3077F SYST BP >= 140 MM HG: CPT | Performed by: FAMILY MEDICINE

## 2023-06-05 RX ORDER — POTASSIUM BICARBONATE 977.5 MG/1
TABLET, EFFERVESCENT ORAL
Qty: 60 TABLET | Refills: 3 | Status: SHIPPED
Start: 2023-06-05 | End: 2023-06-05

## 2023-09-05 ENCOUNTER — OFFICE VISIT (OUTPATIENT)
Dept: PRIMARY CARE CLINIC | Age: 73
End: 2023-09-05
Payer: MEDICARE

## 2023-09-05 VITALS
OXYGEN SATURATION: 97 % | HEART RATE: 70 BPM | HEIGHT: 63 IN | TEMPERATURE: 98.8 F | RESPIRATION RATE: 18 BRPM | WEIGHT: 159 LBS | SYSTOLIC BLOOD PRESSURE: 139 MMHG | DIASTOLIC BLOOD PRESSURE: 58 MMHG | BODY MASS INDEX: 28.17 KG/M2

## 2023-09-05 VITALS
OXYGEN SATURATION: 97 % | SYSTOLIC BLOOD PRESSURE: 139 MMHG | HEIGHT: 63 IN | HEART RATE: 70 BPM | RESPIRATION RATE: 18 BRPM | TEMPERATURE: 98.8 F | BODY MASS INDEX: 28.17 KG/M2 | WEIGHT: 159 LBS | DIASTOLIC BLOOD PRESSURE: 58 MMHG

## 2023-09-05 DIAGNOSIS — I10 PRIMARY HYPERTENSION: ICD-10-CM

## 2023-09-05 DIAGNOSIS — R63.4 UNINTENTIONAL WEIGHT LOSS: ICD-10-CM

## 2023-09-05 DIAGNOSIS — G30.9 ALZHEIMER'S DEMENTIA WITH BEHAVIORAL DISTURBANCE (HCC): Primary | ICD-10-CM

## 2023-09-05 DIAGNOSIS — E55.9 VITAMIN D DEFICIENCY: ICD-10-CM

## 2023-09-05 DIAGNOSIS — G30.9 ALZHEIMER'S DEMENTIA WITH BEHAVIORAL DISTURBANCE (HCC): ICD-10-CM

## 2023-09-05 DIAGNOSIS — I10 PRIMARY HYPERTENSION: Primary | ICD-10-CM

## 2023-09-05 DIAGNOSIS — F02.818 ALZHEIMER'S DEMENTIA WITH BEHAVIORAL DISTURBANCE (HCC): Primary | ICD-10-CM

## 2023-09-05 DIAGNOSIS — Z00.00 INITIAL MEDICARE ANNUAL WELLNESS VISIT: ICD-10-CM

## 2023-09-05 DIAGNOSIS — F02.818 ALZHEIMER'S DEMENTIA WITH BEHAVIORAL DISTURBANCE (HCC): ICD-10-CM

## 2023-09-05 PROCEDURE — 3017F COLORECTAL CA SCREEN DOC REV: CPT | Performed by: FAMILY MEDICINE

## 2023-09-05 PROCEDURE — 1090F PRES/ABSN URINE INCON ASSESS: CPT | Performed by: FAMILY MEDICINE

## 2023-09-05 PROCEDURE — 3078F DIAST BP <80 MM HG: CPT | Performed by: FAMILY MEDICINE

## 2023-09-05 PROCEDURE — 1123F ACP DISCUSS/DSCN MKR DOCD: CPT | Performed by: FAMILY MEDICINE

## 2023-09-05 PROCEDURE — 99349 HOME/RES VST EST MOD MDM 40: CPT | Performed by: FAMILY MEDICINE

## 2023-09-05 PROCEDURE — 3075F SYST BP GE 130 - 139MM HG: CPT | Performed by: FAMILY MEDICINE

## 2023-09-05 PROCEDURE — G0438 PPPS, INITIAL VISIT: HCPCS | Performed by: FAMILY MEDICINE

## 2023-09-05 PROCEDURE — 1036F TOBACCO NON-USER: CPT | Performed by: FAMILY MEDICINE

## 2023-09-05 PROCEDURE — G8417 CALC BMI ABV UP PARAM F/U: HCPCS | Performed by: FAMILY MEDICINE

## 2023-09-05 RX ORDER — DONEPEZIL HYDROCHLORIDE 10 MG/1
10 TABLET, FILM COATED ORAL NIGHTLY
COMMUNITY

## 2023-09-05 RX ORDER — MIRTAZAPINE 7.5 MG/1
7.5 TABLET, FILM COATED ORAL NIGHTLY
COMMUNITY

## 2023-09-05 RX ORDER — MEMANTINE HYDROCHLORIDE 5 MG-10 MG
KIT ORAL
Qty: 1 TABLET | Refills: 0 | Status: SHIPPED | OUTPATIENT
Start: 2023-09-05

## 2023-09-05 ASSESSMENT — PATIENT HEALTH QUESTIONNAIRE - PHQ9
SUM OF ALL RESPONSES TO PHQ QUESTIONS 1-9: 0
1. LITTLE INTEREST OR PLEASURE IN DOING THINGS: 0
SUM OF ALL RESPONSES TO PHQ QUESTIONS 1-9: 0
2. FEELING DOWN, DEPRESSED OR HOPELESS: 0
SUM OF ALL RESPONSES TO PHQ9 QUESTIONS 1 & 2: 0

## 2023-09-05 ASSESSMENT — LIFESTYLE VARIABLES
HOW OFTEN DO YOU HAVE A DRINK CONTAINING ALCOHOL: NEVER
HOW MANY STANDARD DRINKS CONTAINING ALCOHOL DO YOU HAVE ON A TYPICAL DAY: PATIENT DOES NOT DRINK

## 2023-09-05 NOTE — PROGRESS NOTES
2023    Home visit medically necessary in lieu of an office visit due to: Has dementia, won't leave house to go to doctor's office, difficult to get out. Seen with  Minus Melena. HPI:  Patient says she feeling great. Her  says she has been doing okay. He has been giving Aricept and mirtazapine at bedtime. He wanted to stop them last month but apparently forgot. She is sleeping well. Her appetite is very good. Her  reports she cooperates with taking her meds. She is unable to carry out any IADLs. At times she will not bath or change her clothes. She is able to converse freely. She does not remember her age or . She gets agitated occasionally but has been cooperative most of the time. REVIEW OF SYSTEMS:    GENERAL: Appetite VERY GOOD, RECENT WEIGHT GAIN, generally healthy, DECLINING strength AND exercise tolerance. CARDIOVASCULAR: No edema, no chest pains, no murmurs, no palpitations, no syncope, no orthopnea. RESPIRATORY: No shortness of breath, no pain with breathing, no wheezing, no hemoptysis, no cough. GASTROINTESTINAL: No change in appetite, no dysphagia, no heartburn, no abdominal pains, no constipation or diarrhea, no bowel habit changes, no emesis, no melena, no hemorrhoids. GENITOURINARY: No incontinence or retention, no urinary urgency, no nocturia, no frequent UTIs, no dysuria, no change in nature of urine. MUSCULOSKELETAL: No pain in muscles or joints, no swelling or redness of joints, no limitation of range of motion, no weakness or numbness. NEURO/PSYCH: No weakness, no tremor, no seizures, no ataxia. No changes in sleep habits. MEMORY LOSS, CONFUSION, AGITATION, DEPRESSION, ANXIETY. All other systems negative.     Allergies   Allergen Reactions    Penicillins Rash     Past Medical History:   Diagnosis Date    Hypertension      Past Surgical History:   Procedure Laterality Date    CHOLECYSTECTOMY      HYSTERECTOMY (CERVIX STATUS UNKNOWN)      SKIN

## 2023-10-05 ENCOUNTER — TELEPHONE (OUTPATIENT)
Dept: PRIMARY CARE CLINIC | Age: 73
End: 2023-10-05

## 2023-10-05 ENCOUNTER — OFFICE VISIT (OUTPATIENT)
Dept: PRIMARY CARE CLINIC | Age: 73
End: 2023-10-05
Payer: MEDICARE

## 2023-10-05 VITALS
RESPIRATION RATE: 18 BRPM | OXYGEN SATURATION: 98 % | DIASTOLIC BLOOD PRESSURE: 68 MMHG | TEMPERATURE: 97.9 F | HEART RATE: 60 BPM | SYSTOLIC BLOOD PRESSURE: 131 MMHG | BODY MASS INDEX: 26.93 KG/M2 | HEIGHT: 63 IN | WEIGHT: 152 LBS

## 2023-10-05 DIAGNOSIS — F02.818 ALZHEIMER'S DEMENTIA WITH BEHAVIORAL DISTURBANCE (HCC): Primary | ICD-10-CM

## 2023-10-05 DIAGNOSIS — G30.9 ALZHEIMER'S DEMENTIA WITH BEHAVIORAL DISTURBANCE (HCC): Primary | ICD-10-CM

## 2023-10-05 DIAGNOSIS — I10 PRIMARY HYPERTENSION: ICD-10-CM

## 2023-10-05 DIAGNOSIS — E55.9 VITAMIN D DEFICIENCY: ICD-10-CM

## 2023-10-05 PROCEDURE — 3078F DIAST BP <80 MM HG: CPT | Performed by: FAMILY MEDICINE

## 2023-10-05 PROCEDURE — 1123F ACP DISCUSS/DSCN MKR DOCD: CPT | Performed by: FAMILY MEDICINE

## 2023-10-05 PROCEDURE — 1090F PRES/ABSN URINE INCON ASSESS: CPT | Performed by: FAMILY MEDICINE

## 2023-10-05 PROCEDURE — 1036F TOBACCO NON-USER: CPT | Performed by: FAMILY MEDICINE

## 2023-10-05 PROCEDURE — G8484 FLU IMMUNIZE NO ADMIN: HCPCS | Performed by: FAMILY MEDICINE

## 2023-10-05 PROCEDURE — 3017F COLORECTAL CA SCREEN DOC REV: CPT | Performed by: FAMILY MEDICINE

## 2023-10-05 PROCEDURE — 3075F SYST BP GE 130 - 139MM HG: CPT | Performed by: FAMILY MEDICINE

## 2023-10-05 PROCEDURE — G8417 CALC BMI ABV UP PARAM F/U: HCPCS | Performed by: FAMILY MEDICINE

## 2023-10-05 PROCEDURE — 99350 HOME/RES VST EST HIGH MDM 60: CPT | Performed by: FAMILY MEDICINE

## 2023-10-05 RX ORDER — DIVALPROEX SODIUM 125 MG/1
125 CAPSULE, COATED PELLETS ORAL 2 TIMES DAILY
Qty: 60 CAPSULE | Refills: 3 | Status: SHIPPED | OUTPATIENT
Start: 2023-10-05

## 2023-10-05 RX ORDER — MEMANTINE HYDROCHLORIDE 5 MG-10 MG
KIT ORAL
Qty: 1 TABLET | Refills: 0 | Status: SHIPPED | OUTPATIENT
Start: 2023-10-05

## 2023-10-05 NOTE — PROGRESS NOTES
3. 40    ASSESSMENT:  Elderly female has Alzheimer's dementia with behavioral disturbance ([address]); Primary hypertension; Vitamin D deficiency; and Unintentional weight loss on their problem list.     Diagnoses attached to this encounter:  Jimena was seen today for dementia and encopresis. Diagnoses and all orders for this visit:    Alzheimer's dementia with behavioral disturbance (HCC)  -     memantine (NAMENDA TITRATION HEIDI) 28 x 5 MG & 21 x 10 MG tablet pack; 5 mg/day for =1 week; 5 mg twice daily for =1 week; 15 mg/day given in 5 mg and 10 mg  doses for =1 week; then 10 mg twice daily    Primary hypertension    Vitamin D deficiency    Other orders  -     divalproex (DEPAKOTE SPRINKLES) 125 MG DR capsule; Take 1 capsule by mouth 2 times daily (for behaviors and agitation)      PLAN:  Recommended to Al that he start looking at [address] [place] [address] for placement - He says he can't do that because she would refuse. Start Depakote BID for behaviors. Namenda titration kit - not picked up or started last month. Continue Aricept and Remeron 7.5 mg qhs. Continue Vit D 50,000 monthly. Continue other meds as per Rx List. Recheck 1 month. 50 minutes spent on visit, 30 minutes involved education/counseling regarding dementia, HTN, weight loss disease processes, treatment options, meds and coordination of care.    Current Outpatient Medications   Medication Sig Dispense Refill    memantine (NAMENDA TITRATION HEIDI) 28 x 5 MG & 21 x 10 MG tablet pack 5 mg/day for =1 week; 5 mg twice daily for =1 week; 15 mg/day given in 5 mg and 10 mg  doses for =1 week; then 10 mg twice daily 1 tablet 0    divalproex (DEPAKOTE SPRINKLES) 125 MG DR capsule Take 1 capsule by mouth 2 times daily (for behaviors and agitation) 60 capsule 3    donepezil (ARICEPT) 10 MG tablet Take 1 tablet by mouth nightly      mirtazapine (REMERON) 7.5 MG tablet Take 1 tablet by mouth nightly      metoprolol (LOPRESSOR) 100 MG tablet Take 1 tablet

## 2023-10-05 NOTE — TELEPHONE ENCOUNTER
Viv Michaud spouse called the office to request letter for social security. Nveille Cardona was informed that the office needs to know what the letter should say. He was unsure of what should be indicated in the letter.   Neville Cardona will obtain the information from [employer] and call us back

## 2023-10-10 ENCOUNTER — TELEPHONE (OUTPATIENT)
Dept: PRIMARY CARE CLINIC | Age: 73
End: 2023-10-10

## 2023-10-10 NOTE — TELEPHONE ENCOUNTER
----- Message from Cruz Davis MD sent at 10/5/2023 12:38 PM EDT -----  Regarding: follow up call  Please call Al next week and see if he was to  Depakote and Namenda. And ask how Gay Farrell is doing.     Thanks

## 2023-10-10 NOTE — TELEPHONE ENCOUNTER
I called and spoke to Al this morning. He states the pharmacy never called him. I told him that we have receipts from the pharmacy that they received the scripts. I encouraged him to call the pharmacy and get her started on these meds. He states she is doing about the same. He will call the pharmacy and go  her meds.

## 2023-10-30 RX ORDER — DONEPEZIL HYDROCHLORIDE 10 MG/1
10 TABLET, FILM COATED ORAL
Qty: 90 TABLET | Refills: 3 | Status: SHIPPED | OUTPATIENT
Start: 2023-10-30

## 2023-11-02 ENCOUNTER — OFFICE VISIT (OUTPATIENT)
Dept: PRIMARY CARE CLINIC | Age: 73
End: 2023-11-02
Payer: MEDICARE

## 2023-11-02 VITALS
WEIGHT: 152 LBS | DIASTOLIC BLOOD PRESSURE: 54 MMHG | HEIGHT: 64 IN | HEART RATE: 57 BPM | OXYGEN SATURATION: 99 % | SYSTOLIC BLOOD PRESSURE: 117 MMHG | RESPIRATION RATE: 16 BRPM | BODY MASS INDEX: 25.95 KG/M2

## 2023-11-02 DIAGNOSIS — R60.0 EDEMA OF BOTH LOWER LEGS: ICD-10-CM

## 2023-11-02 DIAGNOSIS — G30.9 ALZHEIMER'S DEMENTIA WITH BEHAVIORAL DISTURBANCE (HCC): Primary | ICD-10-CM

## 2023-11-02 DIAGNOSIS — F02.818 ALZHEIMER'S DEMENTIA WITH BEHAVIORAL DISTURBANCE (HCC): Primary | ICD-10-CM

## 2023-11-02 DIAGNOSIS — I10 PRIMARY HYPERTENSION: ICD-10-CM

## 2023-11-02 PROCEDURE — G8417 CALC BMI ABV UP PARAM F/U: HCPCS | Performed by: FAMILY MEDICINE

## 2023-11-02 PROCEDURE — 3078F DIAST BP <80 MM HG: CPT | Performed by: FAMILY MEDICINE

## 2023-11-02 PROCEDURE — 1123F ACP DISCUSS/DSCN MKR DOCD: CPT | Performed by: FAMILY MEDICINE

## 2023-11-02 PROCEDURE — 99349 HOME/RES VST EST MOD MDM 40: CPT | Performed by: FAMILY MEDICINE

## 2023-11-02 PROCEDURE — 3074F SYST BP LT 130 MM HG: CPT | Performed by: FAMILY MEDICINE

## 2023-11-02 PROCEDURE — 3017F COLORECTAL CA SCREEN DOC REV: CPT | Performed by: FAMILY MEDICINE

## 2023-11-02 PROCEDURE — 1036F TOBACCO NON-USER: CPT | Performed by: FAMILY MEDICINE

## 2023-11-02 PROCEDURE — 1090F PRES/ABSN URINE INCON ASSESS: CPT | Performed by: FAMILY MEDICINE

## 2023-11-02 PROCEDURE — G8484 FLU IMMUNIZE NO ADMIN: HCPCS | Performed by: FAMILY MEDICINE

## 2023-11-02 RX ORDER — MEMANTINE HYDROCHLORIDE 10 MG/1
10 TABLET ORAL 2 TIMES DAILY
Qty: 180 TABLET | Refills: 1 | Status: SHIPPED | OUTPATIENT
Start: 2023-11-02

## 2023-11-02 RX ORDER — DIVALPROEX SODIUM 125 MG/1
125 CAPSULE, COATED PELLETS ORAL 2 TIMES DAILY
Qty: 180 CAPSULE | Refills: 1 | Status: SHIPPED | OUTPATIENT
Start: 2023-11-02

## 2023-11-16 RX ORDER — MIRTAZAPINE 7.5 MG/1
7.5 TABLET, FILM COATED ORAL
Qty: 90 TABLET | Refills: 3 | Status: SHIPPED | OUTPATIENT
Start: 2023-11-16

## 2023-12-05 ENCOUNTER — TELEPHONE (OUTPATIENT)
Dept: PRIMARY CARE CLINIC | Age: 73
End: 2023-12-05

## 2023-12-05 NOTE — TELEPHONE ENCOUNTER
She needs to be in a Memory Care unit. I think it would be safer for him to call ambulance so she can be evaluated by [facility] and set up for placement. Since my practice is being closed by , soon I won't be available to help him any more.

## 2023-12-05 NOTE — TELEPHONE ENCOUNTER
Al called. He states Jimena is urinating all over the house and won't let him clean her up or put a depends. He states she is in wet, urine soaked clothes. He states he doesn't think she has a UTI, he thinks she just no longer has the ability to control it or tell him when she needs to go. He is asking if you have any recommendations. She is on the schedule for tomorrow.

## 2023-12-11 ENCOUNTER — APPOINTMENT (OUTPATIENT)
Dept: GENERAL RADIOLOGY | Age: 73
DRG: 641 | End: 2023-12-11
Payer: MEDICARE

## 2023-12-11 ENCOUNTER — HOSPITAL ENCOUNTER (INPATIENT)
Age: 73
LOS: 3 days | Discharge: NURSING, REHAB OR LONG-TERM CARE | DRG: 641 | End: 2023-12-14
Attending: EMERGENCY MEDICINE | Admitting: INTERNAL MEDICINE
Payer: MEDICARE

## 2023-12-11 DIAGNOSIS — E87.70 HYPERVOLEMIA, UNSPECIFIED HYPERVOLEMIA TYPE: ICD-10-CM

## 2023-12-11 DIAGNOSIS — R62.7 FAILURE TO THRIVE IN ADULT: ICD-10-CM

## 2023-12-11 DIAGNOSIS — F03.90 DEMENTIA WITHOUT BEHAVIORAL DISTURBANCE, PSYCHOTIC DISTURBANCE, MOOD DISTURBANCE, OR ANXIETY, UNSPECIFIED DEMENTIA SEVERITY, UNSPECIFIED DEMENTIA TYPE (HCC): ICD-10-CM

## 2023-12-11 DIAGNOSIS — R53.1 GENERALIZED WEAKNESS: Primary | ICD-10-CM

## 2023-12-11 LAB
ALBUMIN SERPL-MCNC: 3.8 G/DL (ref 3.5–5.2)
ALP SERPL-CCNC: 122 U/L (ref 35–104)
ALT SERPL-CCNC: 19 U/L (ref 0–32)
ANION GAP SERPL CALCULATED.3IONS-SCNC: 10 MMOL/L (ref 7–16)
AST SERPL-CCNC: 40 U/L (ref 0–31)
BASOPHILS # BLD: 0.03 K/UL (ref 0–0.2)
BASOPHILS NFR BLD: 0 % (ref 0–2)
BILIRUB SERPL-MCNC: 1.6 MG/DL (ref 0–1.2)
BILIRUB UR QL STRIP: NEGATIVE
BNP SERPL-MCNC: 124 PG/ML (ref 0–125)
BUN SERPL-MCNC: 12 MG/DL (ref 6–23)
CALCIUM SERPL-MCNC: 9.5 MG/DL (ref 8.6–10.2)
CHLORIDE SERPL-SCNC: 97 MMOL/L (ref 98–107)
CLARITY UR: CLEAR
CO2 SERPL-SCNC: 28 MMOL/L (ref 22–29)
COLOR UR: YELLOW
CREAT SERPL-MCNC: 0.7 MG/DL (ref 0.5–1)
EKG ATRIAL RATE: 87 BPM
EKG Q-T INTERVAL: 384 MS
EKG QRS DURATION: 74 MS
EKG QTC CALCULATION (BAZETT): 453 MS
EKG R AXIS: 99 DEGREES
EKG T AXIS: 15 DEGREES
EKG VENTRICULAR RATE: 84 BPM
EOSINOPHIL # BLD: 0.07 K/UL (ref 0.05–0.5)
EOSINOPHILS RELATIVE PERCENT: 1 % (ref 0–6)
ERYTHROCYTE [DISTWIDTH] IN BLOOD BY AUTOMATED COUNT: 13.8 % (ref 11.5–15)
GFR SERPL CREATININE-BSD FRML MDRD: >60 ML/MIN/1.73M2
GLUCOSE SERPL-MCNC: 99 MG/DL (ref 74–99)
GLUCOSE UR STRIP-MCNC: NEGATIVE MG/DL
HCT VFR BLD AUTO: 40.8 % (ref 34–48)
HGB BLD-MCNC: 13.9 G/DL (ref 11.5–15.5)
HGB UR QL STRIP.AUTO: ABNORMAL
IMM GRANULOCYTES # BLD AUTO: 0.04 K/UL (ref 0–0.58)
IMM GRANULOCYTES NFR BLD: 0 % (ref 0–5)
KETONES UR STRIP-MCNC: 40 MG/DL
LEUKOCYTE ESTERASE UR QL STRIP: NEGATIVE
LYMPHOCYTES NFR BLD: 1.13 K/UL (ref 1.5–4)
LYMPHOCYTES RELATIVE PERCENT: 12 % (ref 20–42)
MCH RBC QN AUTO: 30.3 PG (ref 26–35)
MCHC RBC AUTO-ENTMCNC: 34.1 G/DL (ref 32–34.5)
MCV RBC AUTO: 89.1 FL (ref 80–99.9)
MONOCYTES NFR BLD: 0.73 K/UL (ref 0.1–0.95)
MONOCYTES NFR BLD: 8 % (ref 2–12)
NEUTROPHILS NFR BLD: 79 % (ref 43–80)
NEUTS SEG NFR BLD: 7.34 K/UL (ref 1.8–7.3)
NITRITE UR QL STRIP: NEGATIVE
PH UR STRIP: 6 [PH] (ref 5–9)
PLATELET # BLD AUTO: 169 K/UL (ref 130–450)
PMV BLD AUTO: 10.2 FL (ref 7–12)
POTASSIUM SERPL-SCNC: 3.9 MMOL/L (ref 3.5–5)
PROT SERPL-MCNC: 7.3 G/DL (ref 6.4–8.3)
PROT UR STRIP-MCNC: NEGATIVE MG/DL
RBC # BLD AUTO: 4.58 M/UL (ref 3.5–5.5)
RBC #/AREA URNS HPF: ABNORMAL /HPF
SODIUM SERPL-SCNC: 135 MMOL/L (ref 132–146)
SP GR UR STRIP: 1.02 (ref 1–1.03)
TROPONIN I SERPL HS-MCNC: 22 NG/L (ref 0–9)
TROPONIN I SERPL HS-MCNC: 24 NG/L (ref 0–9)
UROBILINOGEN UR STRIP-ACNC: 0.2 EU/DL (ref 0–1)
WBC #/AREA URNS HPF: ABNORMAL /HPF
WBC OTHER # BLD: 9.3 K/UL (ref 4.5–11.5)

## 2023-12-11 PROCEDURE — 93005 ELECTROCARDIOGRAM TRACING: CPT

## 2023-12-11 PROCEDURE — 81001 URINALYSIS AUTO W/SCOPE: CPT

## 2023-12-11 PROCEDURE — 84484 ASSAY OF TROPONIN QUANT: CPT

## 2023-12-11 PROCEDURE — 6360000002 HC RX W HCPCS

## 2023-12-11 PROCEDURE — 99285 EMERGENCY DEPT VISIT HI MDM: CPT

## 2023-12-11 PROCEDURE — 2580000003 HC RX 258: Performed by: NURSE PRACTITIONER

## 2023-12-11 PROCEDURE — 85025 COMPLETE CBC W/AUTO DIFF WBC: CPT

## 2023-12-11 PROCEDURE — 80053 COMPREHEN METABOLIC PANEL: CPT

## 2023-12-11 PROCEDURE — 93010 ELECTROCARDIOGRAM REPORT: CPT | Performed by: INTERNAL MEDICINE

## 2023-12-11 PROCEDURE — 6370000000 HC RX 637 (ALT 250 FOR IP): Performed by: NURSE PRACTITIONER

## 2023-12-11 PROCEDURE — 83880 ASSAY OF NATRIURETIC PEPTIDE: CPT

## 2023-12-11 PROCEDURE — 1200000000 HC SEMI PRIVATE

## 2023-12-11 PROCEDURE — 71045 X-RAY EXAM CHEST 1 VIEW: CPT

## 2023-12-11 RX ORDER — METOPROLOL TARTRATE 100 MG/1
100 TABLET ORAL EVERY MORNING
COMMUNITY

## 2023-12-11 RX ORDER — ENOXAPARIN SODIUM 100 MG/ML
40 INJECTION SUBCUTANEOUS DAILY
Status: DISCONTINUED | OUTPATIENT
Start: 2023-12-12 | End: 2023-12-14 | Stop reason: HOSPADM

## 2023-12-11 RX ORDER — SODIUM CHLORIDE 0.9 % (FLUSH) 0.9 %
10 SYRINGE (ML) INJECTION PRN
Status: DISCONTINUED | OUTPATIENT
Start: 2023-12-11 | End: 2023-12-14 | Stop reason: HOSPADM

## 2023-12-11 RX ORDER — DONEPEZIL HYDROCHLORIDE 10 MG/1
10 TABLET, FILM COATED ORAL NIGHTLY
COMMUNITY

## 2023-12-11 RX ORDER — POLYETHYLENE GLYCOL 3350 17 G/17G
17 POWDER, FOR SOLUTION ORAL DAILY PRN
Status: DISCONTINUED | OUTPATIENT
Start: 2023-12-11 | End: 2023-12-14 | Stop reason: HOSPADM

## 2023-12-11 RX ORDER — POTASSIUM CHLORIDE 20 MEQ/1
40 TABLET, EXTENDED RELEASE ORAL PRN
Status: DISCONTINUED | OUTPATIENT
Start: 2023-12-11 | End: 2023-12-14 | Stop reason: HOSPADM

## 2023-12-11 RX ORDER — ACETAMINOPHEN 650 MG/1
650 SUPPOSITORY RECTAL EVERY 6 HOURS PRN
Status: DISCONTINUED | OUTPATIENT
Start: 2023-12-11 | End: 2023-12-14 | Stop reason: HOSPADM

## 2023-12-11 RX ORDER — MEMANTINE HYDROCHLORIDE 10 MG/1
10 TABLET ORAL 2 TIMES DAILY
Status: DISCONTINUED | OUTPATIENT
Start: 2023-12-11 | End: 2023-12-14 | Stop reason: HOSPADM

## 2023-12-11 RX ORDER — MIRTAZAPINE 7.5 MG/1
7.5 TABLET, FILM COATED ORAL NIGHTLY
COMMUNITY

## 2023-12-11 RX ORDER — FUROSEMIDE 10 MG/ML
20 INJECTION INTRAMUSCULAR; INTRAVENOUS ONCE
Status: COMPLETED | OUTPATIENT
Start: 2023-12-11 | End: 2023-12-11

## 2023-12-11 RX ORDER — METOPROLOL TARTRATE 50 MG/1
100 TABLET, FILM COATED ORAL DAILY
Status: DISCONTINUED | OUTPATIENT
Start: 2023-12-11 | End: 2023-12-14 | Stop reason: HOSPADM

## 2023-12-11 RX ORDER — POTASSIUM CHLORIDE 7.45 MG/ML
10 INJECTION INTRAVENOUS PRN
Status: DISCONTINUED | OUTPATIENT
Start: 2023-12-11 | End: 2023-12-14 | Stop reason: HOSPADM

## 2023-12-11 RX ORDER — DIVALPROEX SODIUM 125 MG/1
125 CAPSULE, COATED PELLETS ORAL 2 TIMES DAILY
Status: DISCONTINUED | OUTPATIENT
Start: 2023-12-11 | End: 2023-12-14 | Stop reason: HOSPADM

## 2023-12-11 RX ORDER — TRIAMTERENE AND HYDROCHLOROTHIAZIDE 37.5; 25 MG/1; MG/1
1 CAPSULE ORAL
COMMUNITY

## 2023-12-11 RX ORDER — SODIUM CHLORIDE 9 MG/ML
INJECTION, SOLUTION INTRAVENOUS CONTINUOUS
Status: DISCONTINUED | OUTPATIENT
Start: 2023-12-11 | End: 2023-12-13

## 2023-12-11 RX ORDER — ONDANSETRON 4 MG/1
4 TABLET, ORALLY DISINTEGRATING ORAL EVERY 8 HOURS PRN
Status: DISCONTINUED | OUTPATIENT
Start: 2023-12-11 | End: 2023-12-14 | Stop reason: HOSPADM

## 2023-12-11 RX ORDER — ONDANSETRON 2 MG/ML
4 INJECTION INTRAMUSCULAR; INTRAVENOUS EVERY 6 HOURS PRN
Status: DISCONTINUED | OUTPATIENT
Start: 2023-12-11 | End: 2023-12-14 | Stop reason: HOSPADM

## 2023-12-11 RX ORDER — MIRTAZAPINE 15 MG/1
7.5 TABLET, FILM COATED ORAL NIGHTLY
Status: DISCONTINUED | OUTPATIENT
Start: 2023-12-11 | End: 2023-12-14 | Stop reason: HOSPADM

## 2023-12-11 RX ORDER — MAGNESIUM SULFATE IN WATER 40 MG/ML
2000 INJECTION, SOLUTION INTRAVENOUS PRN
Status: DISCONTINUED | OUTPATIENT
Start: 2023-12-11 | End: 2023-12-14 | Stop reason: HOSPADM

## 2023-12-11 RX ORDER — DONEPEZIL HYDROCHLORIDE 10 MG/1
10 TABLET, FILM COATED ORAL NIGHTLY
Status: DISCONTINUED | OUTPATIENT
Start: 2023-12-11 | End: 2023-12-14 | Stop reason: HOSPADM

## 2023-12-11 RX ORDER — SODIUM CHLORIDE 9 MG/ML
INJECTION, SOLUTION INTRAVENOUS PRN
Status: DISCONTINUED | OUTPATIENT
Start: 2023-12-11 | End: 2023-12-14 | Stop reason: HOSPADM

## 2023-12-11 RX ORDER — ACETAMINOPHEN 325 MG/1
650 TABLET ORAL EVERY 6 HOURS PRN
Status: DISCONTINUED | OUTPATIENT
Start: 2023-12-11 | End: 2023-12-14 | Stop reason: HOSPADM

## 2023-12-11 RX ORDER — SODIUM CHLORIDE 0.9 % (FLUSH) 0.9 %
5-40 SYRINGE (ML) INJECTION EVERY 12 HOURS SCHEDULED
Status: DISCONTINUED | OUTPATIENT
Start: 2023-12-11 | End: 2023-12-14 | Stop reason: HOSPADM

## 2023-12-11 RX ADMIN — DIVALPROEX SODIUM 125 MG: 125 CAPSULE, COATED PELLETS ORAL at 22:25

## 2023-12-11 RX ADMIN — DONEPEZIL HYDROCHLORIDE 10 MG: 10 TABLET, FILM COATED ORAL at 21:30

## 2023-12-11 RX ADMIN — METOPROLOL TARTRATE 100 MG: 50 TABLET, FILM COATED ORAL at 21:30

## 2023-12-11 RX ADMIN — SODIUM CHLORIDE: 9 INJECTION, SOLUTION INTRAVENOUS at 21:41

## 2023-12-11 RX ADMIN — SODIUM CHLORIDE, PRESERVATIVE FREE 10 ML: 5 INJECTION INTRAVENOUS at 21:30

## 2023-12-11 RX ADMIN — FUROSEMIDE 20 MG: 10 INJECTION, SOLUTION INTRAVENOUS at 17:50

## 2023-12-11 RX ADMIN — MEMANTINE HYDROCHLORIDE 10 MG: 10 TABLET, FILM COATED ORAL at 21:30

## 2023-12-11 RX ADMIN — MIRTAZAPINE 7.5 MG: 15 TABLET, FILM COATED ORAL at 21:30

## 2023-12-11 ASSESSMENT — PAIN - FUNCTIONAL ASSESSMENT: PAIN_FUNCTIONAL_ASSESSMENT: NONE - DENIES PAIN

## 2023-12-11 ASSESSMENT — PAIN SCALES - GENERAL
PAINLEVEL_OUTOF10: 0
PAINLEVEL_OUTOF10: 0

## 2023-12-11 NOTE — ED NOTES
Patient repositioned for comfort.  at bedside. Patient A/O to self only, reoriented to time, place and situation. Reminded that a urine specimen is needed, pure wick in place,  states that patient urinated as they left the house this morning. Call light in reach.       Marjorie Alex  12/11/23 2425

## 2023-12-11 NOTE — PROGRESS NOTES
Database initiated pharmacy and medications verified with the patients  at bedside. Patient has dementia. She is alert to self only. He states she uses no assistive devices and is RA at baseline.

## 2023-12-11 NOTE — ED PROVIDER NOTES
Conjunctiva/sclera: Conjunctivae normal.      Pupils: Pupils are equal, round, and reactive to light. Cardiovascular:      Rate and Rhythm: Normal rate and regular rhythm. Pulses:           Radial pulses are 2+ on the right side and 2+ on the left side. Dorsalis pedis pulses are 2+ on the right side and 2+ on the left side. Posterior tibial pulses are 2+ on the right side and 2+ on the left side. Heart sounds: Normal heart sounds. Pulmonary:      Effort: Pulmonary effort is normal.      Breath sounds: Normal breath sounds. Abdominal:      General: Abdomen is flat. Palpations: Abdomen is soft. Tenderness: There is no abdominal tenderness. Musculoskeletal:      Cervical back: Normal range of motion and neck supple. Right lower leg: No edema. Left lower leg: No edema. Skin:     General: Skin is warm and dry. Neurological:      General: No focal deficit present. Mental Status: She is alert. Mental status is at baseline. Motor: Weakness (Bilateral lower extremities) present. Psychiatric:         Mood and Affect: Mood normal.         Behavior: Behavior normal.       -------------------------------------------------- RESULTS -------------------------------------------------  I have personally reviewed and interpreted all laboratory and imaging results for this patient. Results are listed below.      LABS:  Results for orders placed or performed during the hospital encounter of 12/11/23   CBC with Auto Differential   Result Value Ref Range    WBC 9.3 4.5 - 11.5 k/uL    RBC 4.58 3.50 - 5.50 m/uL    Hemoglobin 13.9 11.5 - 15.5 g/dL    Hematocrit 40.8 34.0 - 48.0 %    MCV 89.1 80.0 - 99.9 fL    MCH 30.3 26.0 - 35.0 pg    MCHC 34.1 32.0 - 34.5 g/dL    RDW 13.8 11.5 - 15.0 %    Platelets 026 977 - 588 k/uL    MPV 10.2 7.0 - 12.0 fL    Neutrophils % 79 43.0 - 80.0 %    Lymphocytes % 12 (L) 20.0 - 42.0 %    Monocytes % 8 2.0 - 12.0 %    Eosinophils % 1 0 - 6 %

## 2023-12-12 LAB
ANION GAP SERPL CALCULATED.3IONS-SCNC: 11 MMOL/L (ref 7–16)
BASOPHILS # BLD: 0.03 K/UL (ref 0–0.2)
BASOPHILS NFR BLD: 0 % (ref 0–2)
BUN SERPL-MCNC: 13 MG/DL (ref 6–23)
CALCIUM SERPL-MCNC: 9.3 MG/DL (ref 8.6–10.2)
CHLORIDE SERPL-SCNC: 98 MMOL/L (ref 98–107)
CO2 SERPL-SCNC: 28 MMOL/L (ref 22–29)
CREAT SERPL-MCNC: 0.7 MG/DL (ref 0.5–1)
EOSINOPHIL # BLD: 0.2 K/UL (ref 0.05–0.5)
EOSINOPHILS RELATIVE PERCENT: 3 % (ref 0–6)
ERYTHROCYTE [DISTWIDTH] IN BLOOD BY AUTOMATED COUNT: 13.6 % (ref 11.5–15)
GFR SERPL CREATININE-BSD FRML MDRD: >60 ML/MIN/1.73M2
GLUCOSE SERPL-MCNC: 86 MG/DL (ref 74–99)
HCT VFR BLD AUTO: 41.1 % (ref 34–48)
HGB BLD-MCNC: 13.9 G/DL (ref 11.5–15.5)
IMM GRANULOCYTES # BLD AUTO: <0.03 K/UL (ref 0–0.58)
IMM GRANULOCYTES NFR BLD: 0 % (ref 0–5)
LYMPHOCYTES NFR BLD: 1.94 K/UL (ref 1.5–4)
LYMPHOCYTES RELATIVE PERCENT: 24 % (ref 20–42)
MCH RBC QN AUTO: 30.2 PG (ref 26–35)
MCHC RBC AUTO-ENTMCNC: 33.8 G/DL (ref 32–34.5)
MCV RBC AUTO: 89.3 FL (ref 80–99.9)
MONOCYTES NFR BLD: 0.79 K/UL (ref 0.1–0.95)
MONOCYTES NFR BLD: 10 % (ref 2–12)
NEUTROPHILS NFR BLD: 63 % (ref 43–80)
NEUTS SEG NFR BLD: 4.99 K/UL (ref 1.8–7.3)
PLATELET # BLD AUTO: 186 K/UL (ref 130–450)
PMV BLD AUTO: 10.3 FL (ref 7–12)
POTASSIUM SERPL-SCNC: 3.8 MMOL/L (ref 3.5–5)
RBC # BLD AUTO: 4.6 M/UL (ref 3.5–5.5)
SODIUM SERPL-SCNC: 137 MMOL/L (ref 132–146)
WBC OTHER # BLD: 8 K/UL (ref 4.5–11.5)

## 2023-12-12 PROCEDURE — 1200000000 HC SEMI PRIVATE

## 2023-12-12 PROCEDURE — 80048 BASIC METABOLIC PNL TOTAL CA: CPT

## 2023-12-12 PROCEDURE — 6370000000 HC RX 637 (ALT 250 FOR IP): Performed by: NURSE PRACTITIONER

## 2023-12-12 PROCEDURE — 6360000002 HC RX W HCPCS: Performed by: NURSE PRACTITIONER

## 2023-12-12 PROCEDURE — 2580000003 HC RX 258: Performed by: NURSE PRACTITIONER

## 2023-12-12 PROCEDURE — 85025 COMPLETE CBC W/AUTO DIFF WBC: CPT

## 2023-12-12 PROCEDURE — 97165 OT EVAL LOW COMPLEX 30 MIN: CPT

## 2023-12-12 PROCEDURE — 2500000003 HC RX 250 WO HCPCS: Performed by: INTERNAL MEDICINE

## 2023-12-12 RX ADMIN — SODIUM CHLORIDE: 9 INJECTION, SOLUTION INTRAVENOUS at 20:47

## 2023-12-12 RX ADMIN — DONEPEZIL HYDROCHLORIDE 10 MG: 10 TABLET, FILM COATED ORAL at 20:30

## 2023-12-12 RX ADMIN — METOPROLOL TARTRATE 100 MG: 50 TABLET, FILM COATED ORAL at 20:30

## 2023-12-12 RX ADMIN — MEMANTINE HYDROCHLORIDE 10 MG: 10 TABLET, FILM COATED ORAL at 20:30

## 2023-12-12 RX ADMIN — ENOXAPARIN SODIUM 40 MG: 100 INJECTION SUBCUTANEOUS at 08:24

## 2023-12-12 RX ADMIN — DIVALPROEX SODIUM 125 MG: 125 CAPSULE, COATED PELLETS ORAL at 20:30

## 2023-12-12 RX ADMIN — MICONAZOLE NITRATE: 20 POWDER TOPICAL at 20:30

## 2023-12-12 RX ADMIN — DIVALPROEX SODIUM 125 MG: 125 CAPSULE, COATED PELLETS ORAL at 08:23

## 2023-12-12 RX ADMIN — MICONAZOLE NITRATE: 20 POWDER TOPICAL at 08:24

## 2023-12-12 RX ADMIN — MIRTAZAPINE 7.5 MG: 15 TABLET, FILM COATED ORAL at 20:30

## 2023-12-12 RX ADMIN — SODIUM CHLORIDE, PRESERVATIVE FREE 10 ML: 5 INJECTION INTRAVENOUS at 08:24

## 2023-12-12 RX ADMIN — MEMANTINE HYDROCHLORIDE 10 MG: 10 TABLET, FILM COATED ORAL at 08:24

## 2023-12-12 ASSESSMENT — PAIN SCALES - GENERAL
PAINLEVEL_OUTOF10: 0

## 2023-12-12 NOTE — H&P
[facility]  History and Physical      CHIEF COMPLAINT:    Chief Complaint   Patient presents with    Extremity Weakness     Difficulty with ambulation    Leg Swelling        Patient of Enid Mcclure MD presents with:  Failure to thrive in adult    History of Present Illness:   Patient is a 73-year-old female with a past medical history of hypertension and dementia who presents to the emergency room for fatigue with ambulatory dysfunction and leg swelling. Patient who resides at home with her  had been having difficulty getting around the house and became too much for her  to care for sending her to the emergency room for further evaluation. A chest x-ray was obtained which revealed no acute findings. Labs reveal a mildly elevated bilirubin of 1.6 however all other labs are relatively unremarkable. Patient is admitted to intermediate telemetry unit for further workup and treatment. On evaluation, she is significantly confused/demented and really not able to provide much of a history at all. Poor hygiene is noted. She is awake, unable to appropriately answer any questions-speaks nonsensically. No family is present at bedside. All blood work reviewed is within normal limits along with vital signs    REVIEW OF SYSTEMS:  Pertinent negatives are above in HPI. 10 point ROS otherwise negative.       Past Medical History:   Diagnosis Date    Hypertension          Past Surgical History:   Procedure Laterality Date    CHOLECYSTECTOMY      HYSTERECTOMY (CERVIX STATUS UNKNOWN)      SKIN BIOPSY         Medications Prior to Admission:    Medications Prior to Admission: donepezil (ARICEPT) 10 MG tablet, Take 1 tablet by mouth nightly  metoprolol (LOPRESSOR) 100 MG tablet, Take 1 tablet by mouth every morning  mirtazapine (REMERON) 7.5 MG tablet, Take 1 tablet by mouth nightly  triamterene-hydroCHLOROthiazide (DYAZIDE) 37.5-25 MG per capsule, Take 1 capsule by mouth three times a week

## 2023-12-12 NOTE — PROGRESS NOTES
4 Eyes Skin Assessment     NAME:  Jimena Rodriguez  YOB: 1950  MEDICAL RECORD NUMBER:  [id number]    The patient is being assessed for  Admission    I agree that at least one RN has performed a thorough Head to Toe Skin Assessment on the patient. ALL assessment sites listed below have been assessed. Areas assessed by both nurses:    Head, Face, Ears, Shoulders, Back, Chest, Arms, Elbows, Hands, Sacrum. Buttock, Coccyx, Ischium, Legs. Feet and Heels, and Under Medical Devices         Does the Patient have a Wound?  No noted wound(s)       Charbel Prevention initiated by RN: Yes  Wound Care Orders initiated by RN: No    Pressure Injury (Stage 3,4, Unstageable, DTI, NWPT, and Complex wounds) if present, place Wound referral order by RN under : No    New Ostomies, if present place, Ostomy referral order under : No     Nurse 1 eSignature: Electronically signed by Bhavana Calvo RN on 12/12/23 at 12:47 AM EST    **SHARE this note so that the co-signing nurse can place an eSignature**    Nurse 2 eSignature: {Esignature:[id number]}

## 2023-12-12 NOTE — PROGRESS NOTES
OCCUPATIONAL THERAPY INITIAL EVALUATION    [address]   [address] [facility], [address] [place] [address] Drive        GSW:                                                  Patient Name: Cyndee Guerrier    MRN: [id number]    : 1950    Room: [address] [place] [address]     Evaluating OT: Shari Riyayosef OTR/L #[id number]    Referring Provider:  CHAY Mcpherson CNP     Specific Provider Orders/Date:  OT Eval and Treat, 23     Diagnosis:   1. Generalized weakness    2. Failure to thrive in adult    3. Hypervolemia, unspecified hypervolemia type    4.  Dementia without behavioral disturbance, psychotic disturbance, mood disturbance, or anxiety, unspecified dementia severity, unspecified dementia type [facility])         Surgery: None       Pertinent Medical History: HTN, dementia      Precautions:  Fall Risk, alarm      Assessment of current deficits    [x] Functional mobility  [x]ADLs  [x] Strength               [x]Cognition    [x] Functional transfers   [x] IADLs         [x] Safety Awareness   [x]Endurance    [] Fine Coordination              [x] Balance      [] Vision/perception   []Sensation     []Gross Motor Coordination  [] ROM  [] Delirium                   [] Motor Control     OT PLAN OF CARE   OT POC based on physician orders, patient diagnosis and results of clinical assessment    Frequency/Duration 1-3 days/wk for 2 weeks PRN     Specific OT Treatment Interventions to include:   * Instruction/training on adapted ADL techniques and AE recommendations to increase functional independence within precautions       * Training on energy conservation strategies, correct breathing pattern and techniques to improve independence/tolerance for self-care routine  * Functional transfer/mobility training/DME recommendations for increased independence, safety, and fall prevention  * Patient/Family education to increase follow through with safety techniques and functional independence  *

## 2023-12-13 PROCEDURE — 1200000000 HC SEMI PRIVATE

## 2023-12-13 PROCEDURE — 6360000002 HC RX W HCPCS: Performed by: NURSE PRACTITIONER

## 2023-12-13 PROCEDURE — 6370000000 HC RX 637 (ALT 250 FOR IP): Performed by: NURSE PRACTITIONER

## 2023-12-13 PROCEDURE — 97161 PT EVAL LOW COMPLEX 20 MIN: CPT

## 2023-12-13 RX ADMIN — MICONAZOLE NITRATE: 20 POWDER TOPICAL at 09:37

## 2023-12-13 RX ADMIN — DIVALPROEX SODIUM 125 MG: 125 CAPSULE, COATED PELLETS ORAL at 20:22

## 2023-12-13 RX ADMIN — MEMANTINE HYDROCHLORIDE 10 MG: 10 TABLET, FILM COATED ORAL at 20:21

## 2023-12-13 RX ADMIN — DONEPEZIL HYDROCHLORIDE 10 MG: 10 TABLET, FILM COATED ORAL at 20:21

## 2023-12-13 RX ADMIN — MIRTAZAPINE 7.5 MG: 15 TABLET, FILM COATED ORAL at 20:22

## 2023-12-13 RX ADMIN — METOPROLOL TARTRATE 100 MG: 50 TABLET, FILM COATED ORAL at 20:22

## 2023-12-13 RX ADMIN — ENOXAPARIN SODIUM 40 MG: 100 INJECTION SUBCUTANEOUS at 09:38

## 2023-12-13 RX ADMIN — MICONAZOLE NITRATE: 20 POWDER TOPICAL at 20:22

## 2023-12-13 NOTE — CARE COORDINATION
Social Work/Discharge Planning:  Met with patient  Shlomo Thomson and completed initial assessment. Explained Social Work role and discussed transition of care/discharge planning. Patient lives with her  in a two story house. PTA she is independent with no adaptive device. She has no durable medcial equipment. Patient  denies any history with c or snf. Reviewed therapy score indicating need for rehab and provided snf choice list.  Shlomo Thomson states his first snf choice is [facility], 2nd-[facility] and 3rd[facility] with [employer]. Called Luigi Carbone with [facility] and left a message in regards to bed availability. Referral made to [facility] with [address] and facility will review patient information. Will continue to follow and assist with discharge planning. Electronically signed by KULDEEP Diana on 12/13/2023 at 10:36 AM    Addendum:  Referral made to Luigi Carbone with [facility] and she will review patient information. Will continue to follow.   Electronically signed by KULDEEP Diana on 12/13/2023 at 11:10 AM

## 2023-12-13 NOTE — PROGRESS NOTES
Physical Therapy  Facility/Department: [address]  Physical Therapy Initial Assessment    Name: Nickie Sandifer  : 1950  MRN: [id number]  Date of Service: 2023        Patient Diagnosis(es): The primary encounter diagnosis was Generalized weakness. Diagnoses of Failure to thrive in adult, Hypervolemia, unspecified hypervolemia type, and Dementia without behavioral disturbance, psychotic disturbance, mood disturbance, or anxiety, unspecified dementia severity, unspecified dementia type ([address]) were also pertinent to this visit. Past Medical History:  has a past medical history of Hypertension. Past Surgical History:  has a past surgical history that includes Cholecystectomy; Hysterectomy; and skin biopsy. Evaluating Therapist: Bambi Strauss PT    Room #:  7276/2428-F  Diagnosis:  Failure to thrive in adult [R62.7]  PMHx/PSHx:  HTN  Precautions:  falls, alarm      Social:  Pt unable to report. Per chart pt lives with spouse in a 2 floor plan with 2 steps to enter. Pt independent without device but has a cane. Per chart pt's spouse having difficulty caring for her at home. Initial Evaluation  Date: 23 Treatment      Short Term/ Long Term   Goals   Was pt agreeable to Eval/treatment? yes     Does pt have pain? No c/o pain     Bed Mobility  Rolling: mod assist  Supine to sit: mod assist  Sit to supine: mod assist  Scooting: mod assist  Min assist   Transfers Sit to stand: max assist  Stand to sit: max assist  Stand pivot: NT  Mod assist   Ambulation    Unable due to poor standing balance and inability to fully stand upright. 10 feet with ww with mod assist   Stair Negotiation  Ascended and descended  NT      LE strength     Grossly 3/5    4-/5   balance      SB sitting max standing     AM-PAC Raw score               10/24         Pt is alert and Oriented to person. Difficulty following commands.    LE ROM: WFL  Sensation: NT  Edema: B LE's  Endurance: fair     ASSESSMENT:    Pt

## 2023-12-13 NOTE — PROGRESS NOTES
Rafael Estrada NP made aware of patient refusing oral medications and removing IV again. Okay to have IV per her order. To continue monitoring patient.

## 2023-12-14 VITALS
HEART RATE: 63 BPM | OXYGEN SATURATION: 100 % | RESPIRATION RATE: 16 BRPM | WEIGHT: 165.2 LBS | DIASTOLIC BLOOD PRESSURE: 51 MMHG | BODY MASS INDEX: 28.2 KG/M2 | TEMPERATURE: 97.5 F | HEIGHT: 64 IN | SYSTOLIC BLOOD PRESSURE: 130 MMHG

## 2023-12-14 PROCEDURE — 97530 THERAPEUTIC ACTIVITIES: CPT

## 2023-12-14 PROCEDURE — 6360000002 HC RX W HCPCS: Performed by: NURSE PRACTITIONER

## 2023-12-14 PROCEDURE — 6370000000 HC RX 637 (ALT 250 FOR IP): Performed by: NURSE PRACTITIONER

## 2023-12-14 RX ORDER — DIVALPROEX SODIUM 125 MG/1
125 CAPSULE, COATED PELLETS ORAL 2 TIMES DAILY
Qty: 60 CAPSULE | Refills: 3 | DISCHARGE
Start: 2023-12-14

## 2023-12-14 RX ADMIN — DIVALPROEX SODIUM 125 MG: 125 CAPSULE, COATED PELLETS ORAL at 08:22

## 2023-12-14 RX ADMIN — ENOXAPARIN SODIUM 40 MG: 100 INJECTION SUBCUTANEOUS at 08:22

## 2023-12-14 RX ADMIN — MICONAZOLE NITRATE: 20 POWDER TOPICAL at 08:22

## 2023-12-14 RX ADMIN — MEMANTINE HYDROCHLORIDE 10 MG: 10 TABLET, FILM COATED ORAL at 08:22

## 2023-12-14 ASSESSMENT — PAIN SCALES - GENERAL
PAINLEVEL_OUTOF10: 0
PAINLEVEL_OUTOF10: 0

## 2023-12-14 NOTE — DISCHARGE SUMMARY
medications      donepezil 10 MG tablet  Commonly known as: ARICEPT  Ask about: Which instructions should I use? Dyazide 37.5-25 MG per capsule  Generic drug: triamterene-hydroCHLOROthiazide  Ask about: Which instructions should I use?     metoprolol 100 MG tablet  Commonly known as: LOPRESSOR  Ask about: Which instructions should I use?     mirtazapine 7.5 MG tablet  Commonly known as: Kush Timothy  Ask about: Which instructions should I use? potassium bicarbonate 25 MEQ disintegrating tablet  Commonly known as: EFFER-K/K-LYTE            Activity: activity as tolerated and no driving for today  Diet: regular diet    Pt has been advised to: Follow-up with Emerald Flores MD in 1 week.   Follow-up with consultants as recommended by them    Note that over 30 minutes was spent in preparing discharge papers, discussing discharge with patient, medication review, etc.    Signed:  Landis Kawasaki, DO  12/14/2023  12:52 PM

## 2023-12-14 NOTE — PLAN OF CARE
Problem: ABCDS Injury Assessment  Goal: Absence of physical injury  Outcome: Progressing     Problem: Skin/Tissue Integrity  Goal: Absence of new skin breakdown  Description: 1. Monitor for areas of redness and/or skin breakdown  2. Assess vascular access sites hourly  3. Every 4-6 hours minimum:  Change oxygen saturation probe site  4. Every 4-6 hours:  If on nasal continuous positive airway pressure, respiratory therapy assess nares and determine need for appliance change or resting period. Outcome: Progressing     Problem: Discharge Planning  Goal: Discharge to home or other facility with appropriate resources  Outcome: Progressing     Problem: Pain  Goal: Verbalizes/displays adequate comfort level or baseline comfort level  Outcome: Progressing     Problem: Safety - Adult  Goal: Free from fall injury  Outcome: Progressing     Problem: Confusion  Goal: Confusion, delirium, dementia, or psychosis is improved or at baseline  Description: INTERVENTIONS:  1. Assess for possible contributors to thought disturbance, including medications, impaired vision or hearing, underlying metabolic abnormalities, dehydration, psychiatric diagnoses, and notify attending LIP  2. [place] high risk fall precautions, as indicated  3. Provide frequent short contacts to provide reality reorientation, refocusing and direction  4. Decrease environmental stimuli, including noise as appropriate  5. Monitor and intervene to maintain adequate nutrition, hydration, elimination, sleep and activity  6. If unable to ensure safety without constant attention obtain sitter and review sitter guidelines with assigned personnel  7.  Initiate Psychosocial CNS and Spiritual Care consult, as indicated  Outcome: Progressing
Problem: ABCDS Injury Assessment  Goal: Absence of physical injury  Outcome: Progressing     Problem: Skin/Tissue Integrity  Goal: Absence of new skin breakdown  Description: 1. Monitor for areas of redness and/or skin breakdown  2. Assess vascular access sites hourly  3. Every 4-6 hours minimum:  Change oxygen saturation probe site  4. Every 4-6 hours:  If on nasal continuous positive airway pressure, respiratory therapy assess nares and determine need for appliance change or resting period. Outcome: Progressing     Problem: Discharge Planning  Goal: Discharge to home or other facility with appropriate resources  Outcome: Progressing     Problem: Pain  Goal: Verbalizes/displays adequate comfort level or baseline comfort level  Outcome: Progressing     Problem: Safety - Adult  Goal: Free from fall injury  Outcome: Progressing     Problem: Confusion  Goal: Confusion, delirium, dementia, or psychosis is improved or at baseline  Description: INTERVENTIONS:  1. Assess for possible contributors to thought disturbance, including medications, impaired vision or hearing, underlying metabolic abnormalities, dehydration, psychiatric diagnoses, and notify attending LIP  2. [place] high risk fall precautions, as indicated  3. Provide frequent short contacts to provide reality reorientation, refocusing and direction  4. Decrease environmental stimuli, including noise as appropriate  5. Monitor and intervene to maintain adequate nutrition, hydration, elimination, sleep and activity  6. If unable to ensure safety without constant attention obtain sitter and review sitter guidelines with assigned personnel  7.  Initiate Psychosocial CNS and Spiritual Care consult, as indicated  Outcome: Progressing
Enma RN  Outcome: Progressing

## 2023-12-14 NOTE — PROGRESS NOTES
Nurse to nurse give to Rey Sagastume at [facility]. Discharge papers faxed to [phone].

## 2023-12-14 NOTE — CARE COORDINATION
Social Work/Discharge Planning:  [facility] can accept patient. Received notification patient will discharge today. Called [facility] and arranged ambulance for 1:00pm.  Notified patient, her magdiel Ruelas (ph: [phone]), RN and liaison Scotty Carmona at [facility] of tentative discharge time. Electronically signed by KULDEEP Riggins on 12/14/2023 at 10:50 AM    Addendum:  [id number] completed.   Electronically signed by KULDEEP Riggins on 12/14/2023 at 1:46 PM

## 2023-12-14 NOTE — PROGRESS NOTES
Occupational Therapy  OT BEDSIDE TREATMENT NOTE      Date:2023  Patient Name: Lynne Mason  MRN: [id number]  : 1950  Room: [address] [place] [address]      Evaluating OT: Saira Godinez OTR/L #[id number]     Referring Provider:  CHAY Michaels CNP      Specific Provider Orders/Date:  OT Eval and Treat, 23      Diagnosis:   1. Generalized weakness    2. Failure to thrive in adult    3. Hypervolemia, unspecified hypervolemia type    4.  Dementia without behavioral disturbance, psychotic disturbance, mood disturbance, or anxiety, unspecified dementia severity, unspecified dementia type [facility])         Surgery: None        Pertinent Medical History: HTN, dementia      Precautions:  Fall Risk, alarm       Assessment of current deficits    [x] Functional mobility            [x]ADLs           [x] Strength                   [x]Cognition    [x] Functional transfers          [x] IADLs          [x] Safety Awareness   [x]Endurance    [] Fine Coordination              [x] Balance      [] Vision/perception    []Sensation      []Gross Motor Coordination  [] ROM           [] Delirium                   [] Motor Control      OT PLAN OF CARE   OT POC based on physician orders, patient diagnosis and results of clinical assessment     Frequency/Duration 1-3 days/wk for 2 weeks PRN      Specific OT Treatment Interventions to include:   * Instruction/training on adapted ADL techniques and AE recommendations to increase functional independence within precautions       * Training on energy conservation strategies, correct breathing pattern and techniques to improve independence/tolerance for self-care routine  * Functional transfer/mobility training/DME recommendations for increased independence, safety, and fall prevention  * Patient/Family education to increase follow through with safety techniques and functional independence  * Recommendation of environmental modifications for increased safety with functional transfers/mobility

## 2024-01-08 ENCOUNTER — APPOINTMENT (OUTPATIENT)
Dept: CT IMAGING | Age: 74
End: 2024-01-08
Payer: MEDICARE

## 2024-01-08 ENCOUNTER — HOSPITAL ENCOUNTER (EMERGENCY)
Age: 74
Discharge: HOME | End: 2024-01-09
Attending: EMERGENCY MEDICINE
Payer: MEDICARE

## 2024-01-08 ENCOUNTER — APPOINTMENT (OUTPATIENT)
Dept: GENERAL RADIOLOGY | Age: 74
End: 2024-01-08
Payer: MEDICARE

## 2024-01-08 DIAGNOSIS — F02.818 ALZHEIMER'S DEMENTIA WITH BEHAVIORAL DISTURBANCE (HCC): Primary | ICD-10-CM

## 2024-01-08 DIAGNOSIS — G30.9 ALZHEIMER'S DEMENTIA WITH BEHAVIORAL DISTURBANCE (HCC): Primary | ICD-10-CM

## 2024-01-08 LAB
ALBUMIN SERPL-MCNC: 3.7 G/DL (ref 3.5–5.2)
ALP SERPL-CCNC: 134 U/L (ref 35–104)
ALT SERPL-CCNC: 34 U/L (ref 0–32)
ANION GAP SERPL CALCULATED.3IONS-SCNC: 11 MMOL/L (ref 7–16)
AST SERPL-CCNC: 47 U/L (ref 0–31)
B PARAP IS1001 DNA NPH QL NAA+NON-PROBE: NOT DETECTED
B PERT DNA SPEC QL NAA+PROBE: NOT DETECTED
BASOPHILS # BLD: 0.04 K/UL (ref 0–0.2)
BASOPHILS NFR BLD: 1 % (ref 0–2)
BILIRUB SERPL-MCNC: 0.4 MG/DL (ref 0–1.2)
BUN SERPL-MCNC: 16 MG/DL (ref 6–23)
C PNEUM DNA NPH QL NAA+NON-PROBE: NOT DETECTED
CALCIUM SERPL-MCNC: 9.4 MG/DL (ref 8.6–10.2)
CHLORIDE SERPL-SCNC: 100 MMOL/L (ref 98–107)
CO2 SERPL-SCNC: 27 MMOL/L (ref 22–29)
CREAT SERPL-MCNC: 0.6 MG/DL (ref 0.5–1)
EOSINOPHIL # BLD: 0.19 K/UL (ref 0.05–0.5)
EOSINOPHILS RELATIVE PERCENT: 2 % (ref 0–6)
ERYTHROCYTE [DISTWIDTH] IN BLOOD BY AUTOMATED COUNT: 13.6 % (ref 11.5–15)
FLUAV RNA NPH QL NAA+NON-PROBE: NOT DETECTED
FLUBV RNA NPH QL NAA+NON-PROBE: NOT DETECTED
GFR SERPL CREATININE-BSD FRML MDRD: >60 ML/MIN/1.73M2
GLUCOSE SERPL-MCNC: 104 MG/DL (ref 74–99)
HADV DNA NPH QL NAA+NON-PROBE: NOT DETECTED
HCOV 229E RNA NPH QL NAA+NON-PROBE: NOT DETECTED
HCOV HKU1 RNA NPH QL NAA+NON-PROBE: NOT DETECTED
HCOV NL63 RNA NPH QL NAA+NON-PROBE: NOT DETECTED
HCOV OC43 RNA NPH QL NAA+NON-PROBE: NOT DETECTED
HCT VFR BLD AUTO: 39.8 % (ref 34–48)
HGB BLD-MCNC: 13.9 G/DL (ref 11.5–15.5)
HMPV RNA NPH QL NAA+NON-PROBE: NOT DETECTED
HPIV1 RNA NPH QL NAA+NON-PROBE: NOT DETECTED
HPIV2 RNA NPH QL NAA+NON-PROBE: NOT DETECTED
HPIV3 RNA NPH QL NAA+NON-PROBE: NOT DETECTED
HPIV4 RNA NPH QL NAA+NON-PROBE: NOT DETECTED
IMM GRANULOCYTES # BLD AUTO: 0.03 K/UL (ref 0–0.58)
IMM GRANULOCYTES NFR BLD: 0 % (ref 0–5)
LYMPHOCYTES NFR BLD: 2.26 K/UL (ref 1.5–4)
LYMPHOCYTES RELATIVE PERCENT: 28 % (ref 20–42)
M PNEUMO DNA NPH QL NAA+NON-PROBE: NOT DETECTED
MAGNESIUM SERPL-MCNC: 2.2 MG/DL (ref 1.6–2.6)
MCH RBC QN AUTO: 30.8 PG (ref 26–35)
MCHC RBC AUTO-ENTMCNC: 34.9 G/DL (ref 32–34.5)
MCV RBC AUTO: 88.2 FL (ref 80–99.9)
MONOCYTES NFR BLD: 0.61 K/UL (ref 0.1–0.95)
MONOCYTES NFR BLD: 7 % (ref 2–12)
NEUTROPHILS NFR BLD: 62 % (ref 43–80)
NEUTS SEG NFR BLD: 5.06 K/UL (ref 1.8–7.3)
PLATELET # BLD AUTO: 185 K/UL (ref 130–450)
PMV BLD AUTO: 10.7 FL (ref 7–12)
POTASSIUM SERPL-SCNC: 4.4 MMOL/L (ref 3.5–5)
PROT SERPL-MCNC: 7.1 G/DL (ref 6.4–8.3)
RBC # BLD AUTO: 4.51 M/UL (ref 3.5–5.5)
RSV RNA NPH QL NAA+NON-PROBE: NOT DETECTED
RV+EV RNA NPH QL NAA+NON-PROBE: NOT DETECTED
SARS-COV-2 RNA NPH QL NAA+NON-PROBE: NOT DETECTED
SODIUM SERPL-SCNC: 138 MMOL/L (ref 132–146)
SPECIMEN DESCRIPTION: NORMAL
WBC OTHER # BLD: 8.2 K/UL (ref 4.5–11.5)

## 2024-01-08 PROCEDURE — 93005 ELECTROCARDIOGRAM TRACING: CPT | Performed by: EMERGENCY MEDICINE

## 2024-01-08 PROCEDURE — 70450 CT HEAD/BRAIN W/O DYE: CPT

## 2024-01-08 PROCEDURE — 85025 COMPLETE CBC W/AUTO DIFF WBC: CPT

## 2024-01-08 PROCEDURE — 99285 EMERGENCY DEPT VISIT HI MDM: CPT

## 2024-01-08 PROCEDURE — 0202U NFCT DS 22 TRGT SARS-COV-2: CPT

## 2024-01-08 PROCEDURE — 71046 X-RAY EXAM CHEST 2 VIEWS: CPT

## 2024-01-08 PROCEDURE — 80053 COMPREHEN METABOLIC PANEL: CPT

## 2024-01-08 PROCEDURE — 83735 ASSAY OF MAGNESIUM: CPT

## 2024-01-08 ASSESSMENT — PAIN - FUNCTIONAL ASSESSMENT: PAIN_FUNCTIONAL_ASSESSMENT: NONE - DENIES PAIN

## 2024-01-09 VITALS
DIASTOLIC BLOOD PRESSURE: 79 MMHG | TEMPERATURE: 98 F | HEART RATE: 76 BPM | SYSTOLIC BLOOD PRESSURE: 123 MMHG | OXYGEN SATURATION: 97 % | RESPIRATION RATE: 19 BRPM

## 2024-01-09 LAB
BILIRUB UR QL STRIP: NEGATIVE
CLARITY UR: CLEAR
COLOR UR: YELLOW
EKG ATRIAL RATE: 55 BPM
EKG P AXIS: 11 DEGREES
EKG P-R INTERVAL: 184 MS
EKG Q-T INTERVAL: 464 MS
EKG QRS DURATION: 84 MS
EKG QTC CALCULATION (BAZETT): 443 MS
EKG R AXIS: -35 DEGREES
EKG T AXIS: 37 DEGREES
EKG VENTRICULAR RATE: 55 BPM
GLUCOSE UR STRIP-MCNC: NEGATIVE MG/DL
HGB UR QL STRIP.AUTO: NEGATIVE
KETONES UR STRIP-MCNC: NEGATIVE MG/DL
LEUKOCYTE ESTERASE UR QL STRIP: ABNORMAL
NITRITE UR QL STRIP: NEGATIVE
PH UR STRIP: 7.5 [PH] (ref 5–9)
PROT UR STRIP-MCNC: NEGATIVE MG/DL
RBC #/AREA URNS HPF: ABNORMAL /HPF
SP GR UR STRIP: 1.01 (ref 1–1.03)
UROBILINOGEN UR STRIP-ACNC: 0.2 EU/DL (ref 0–1)
WBC #/AREA URNS HPF: ABNORMAL /HPF

## 2024-01-09 PROCEDURE — 93010 ELECTROCARDIOGRAM REPORT: CPT | Performed by: INTERNAL MEDICINE

## 2024-01-09 PROCEDURE — 81001 URINALYSIS AUTO W/SCOPE: CPT

## 2024-01-09 NOTE — ED PROVIDER NOTES
ED PROVIDER NOTE    Chief Complaint   Patient presents with    Altered Mental Status     Sent from SNF for increased confusion and bizarre behavior, hx dementia, unk LKW, unknown baseline        HPI:  1/8/24,   Time: 8:06 PM SARABJIT Rodriguez is a 73 y.o. female presenting to the ED for altered mental status.  Patient was sent from nursing facility for increased confusion from her baseline.  Has a known history of dementia.  History is limited from patient due to dementia.  She denies any complaints at this time.  Denies any headache, dizziness, fever, chills, cough, chest pain, shortness of breath, abdominal pain, nausea, vomiting, diarrhea.  Normal p.o. intake and urine output.    Chart review: hx of HTN, alzheimer's dementia    Reviewed internal medicine progress note by Lakeisha Garcia NP from 12/13/2023:  Patient was admitted for failure to thrive, social work was consulted for placement as family is unable to care for her at home.      Review of Systems:     Review of Systems  Pertinent positives and negatives as stated in HPI     --------------------------------------------- PAST HISTORY ---------------------------------------------  Past Medical History:   Past Medical History:   Diagnosis Date    Hypertension        Past Surgical History:   Past Surgical History:   Procedure Laterality Date    CHOLECYSTECTOMY      HYSTERECTOMY (CERVIX STATUS UNKNOWN)      SKIN BIOPSY         Social History:   Social History     Socioeconomic History    Marital status:    Tobacco Use    Smoking status: Never     Passive exposure: Never    Smokeless tobacco: Never   Substance and Sexual Activity    Alcohol use: No    Drug use: No    Sexual activity: Not Currently     Social Determinants of Health     Financial Resource Strain: Low Risk  (3/15/2023)    Overall Financial Resource Strain (CARDIA)     Difficulty of Paying Living Expenses: Not hard at all   Food Insecurity: No Food Insecurity (12/11/2023)

## 2024-01-09 NOTE — DISCHARGE INSTR - COC
Continuity of Care Form    Patient Name: Jimena Rodriguez   :  1950  MRN:  [id number]    Admit date:  (Not on file)  Discharge date:  ***    Code Status Order: Prior   Advance Directives:     Admitting Physician:  No admitting provider for patient encounter.  PCP: Gerald Riggins MD    Discharging Nurse: ***  Discharging Hospital Unit/Room#: No information available for this encounter.  Discharging Unit Phone Number: ***    Emergency Contact:   Extended Emergency Contact Information  Primary Emergency Contact: Jorge Alberto Rodriguez  Address: [address] [place] [address]  Home Phone: [phone]  Mobile Phone: [phone]  Relation: Spouse  Preferred language: English   needed? No    Past Surgical History:  Past Surgical History:   Procedure Laterality Date    CHOLECYSTECTOMY      HYSTERECTOMY (CERVIX STATUS UNKNOWN)      SKIN BIOPSY         Immunization History:     There is no immunization history on file for this patient.    Active Problems:  Patient Active Problem List   Diagnosis Code    Alzheimer's dementia with behavioral disturbance (HCC) G30.9, F02.818    Primary hypertension I10    Vitamin D deficiency E55.9    Unintentional weight loss R63.4    Edema of both lower legs R60.0    Failure to thrive in adult R62.7       Isolation/Infection:   Isolation            No Isolation          Patient Infection Status       Infection Onset Added Last Indicated Last Indicated By Review Planned Expiration Resolved Resolved By    COVID-19 (Rule Out) 24 Respiratory Panel, Molecular, with COVID-19 (Restricted: peds pts or suitable admitted adults) (Ordered) 24              Nurse Assessment:  Last Vital Signs: BP (!) 139/51   Pulse 60   Temp 98.4 °F (36.9 °C)   Resp 16   SpO2 100%     Last documented pain score (0-10 scale):    Last Weight:   Wt Readings from Last 1 Encounters:   23 74.9 kg (165 lb 3.2 oz)     Mental Status:

## 2024-01-09 NOTE — ED NOTES
Report given to EMS at bedside. Pt updated on d/c and plan of care, report called to SNF per nightshift RN

## 2024-01-09 NOTE — DISCHARGE INSTRUCTIONS
Return if any new or worsening symptoms.  Return if any chest pain or shortness of breath.  Follow-up with your primary care provider.

## 2024-01-09 NOTE — ED NOTES
Call placed to [facility] to transport back to [facility] 3-4 hours.   N2N called to RN at facility all questions answered.

## 2024-05-11 ENCOUNTER — HOSPITAL ENCOUNTER (EMERGENCY)
Age: 74
Discharge: HOME | End: 2024-05-11
Attending: STUDENT IN AN ORGANIZED HEALTH CARE EDUCATION/TRAINING PROGRAM
Payer: MEDICARE

## 2024-05-11 ENCOUNTER — APPOINTMENT (OUTPATIENT)
Dept: GENERAL RADIOLOGY | Age: 74
End: 2024-05-11
Payer: MEDICARE

## 2024-05-11 VITALS
SYSTOLIC BLOOD PRESSURE: 111 MMHG | HEART RATE: 53 BPM | TEMPERATURE: 97.9 F | OXYGEN SATURATION: 99 % | BODY MASS INDEX: 28.17 KG/M2 | WEIGHT: 165 LBS | HEIGHT: 64 IN | DIASTOLIC BLOOD PRESSURE: 46 MMHG | RESPIRATION RATE: 17 BRPM

## 2024-05-11 DIAGNOSIS — R19.7 DIARRHEA, UNSPECIFIED TYPE: ICD-10-CM

## 2024-05-11 DIAGNOSIS — R06.00 DYSPNEA AND RESPIRATORY ABNORMALITIES: Primary | ICD-10-CM

## 2024-05-11 DIAGNOSIS — R06.89 DYSPNEA AND RESPIRATORY ABNORMALITIES: Primary | ICD-10-CM

## 2024-05-11 LAB
ALBUMIN SERPL-MCNC: 3.3 G/DL (ref 3.5–5.2)
ALP SERPL-CCNC: 95 U/L (ref 35–104)
ALT SERPL-CCNC: 21 U/L (ref 0–32)
ANION GAP SERPL CALCULATED.3IONS-SCNC: 13 MMOL/L (ref 7–16)
AST SERPL-CCNC: 47 U/L (ref 0–31)
BASOPHILS # BLD: 0.01 K/UL (ref 0–0.2)
BASOPHILS NFR BLD: 0 % (ref 0–2)
BILIRUB SERPL-MCNC: 0.5 MG/DL (ref 0–1.2)
BUN SERPL-MCNC: 22 MG/DL (ref 6–23)
CALCIUM SERPL-MCNC: 9.1 MG/DL (ref 8.6–10.2)
CHLORIDE SERPL-SCNC: 103 MMOL/L (ref 98–107)
CO2 SERPL-SCNC: 25 MMOL/L (ref 22–29)
CREAT SERPL-MCNC: 0.7 MG/DL (ref 0.5–1)
EOSINOPHIL # BLD: 0.01 K/UL (ref 0.05–0.5)
EOSINOPHILS RELATIVE PERCENT: 0 % (ref 0–6)
ERYTHROCYTE [DISTWIDTH] IN BLOOD BY AUTOMATED COUNT: 13.8 % (ref 11.5–15)
GFR, ESTIMATED: >90 ML/MIN/1.73M2
GLUCOSE SERPL-MCNC: 82 MG/DL (ref 74–99)
HCT VFR BLD AUTO: 41.8 % (ref 34–48)
HGB BLD-MCNC: 14 G/DL (ref 11.5–15.5)
IMM GRANULOCYTES # BLD AUTO: 0.04 K/UL (ref 0–0.58)
IMM GRANULOCYTES NFR BLD: 1 % (ref 0–5)
LYMPHOCYTES NFR BLD: 1.24 K/UL (ref 1.5–4)
LYMPHOCYTES RELATIVE PERCENT: 26 % (ref 20–42)
MCH RBC QN AUTO: 31.2 PG (ref 26–35)
MCHC RBC AUTO-ENTMCNC: 33.5 G/DL (ref 32–34.5)
MCV RBC AUTO: 93.1 FL (ref 80–99.9)
MONOCYTES NFR BLD: 0.56 K/UL (ref 0.1–0.95)
MONOCYTES NFR BLD: 12 % (ref 2–12)
NEUTROPHILS NFR BLD: 61 % (ref 43–80)
NEUTS SEG NFR BLD: 2.9 K/UL (ref 1.8–7.3)
PLATELET, FLUORESCENCE: 132 K/UL (ref 130–450)
PMV BLD AUTO: 10.9 FL (ref 7–12)
POTASSIUM SERPL-SCNC: 4 MMOL/L (ref 3.5–5)
PROT SERPL-MCNC: 6.8 G/DL (ref 6.4–8.3)
RBC # BLD AUTO: 4.49 M/UL (ref 3.5–5.5)
SODIUM SERPL-SCNC: 141 MMOL/L (ref 132–146)
TROPONIN I SERPL HS-MCNC: 26 NG/L (ref 0–9)
TROPONIN I SERPL HS-MCNC: 27 NG/L (ref 0–9)
WBC OTHER # BLD: 4.8 K/UL (ref 4.5–11.5)

## 2024-05-11 PROCEDURE — 84484 ASSAY OF TROPONIN QUANT: CPT

## 2024-05-11 PROCEDURE — 93005 ELECTROCARDIOGRAM TRACING: CPT | Performed by: STUDENT IN AN ORGANIZED HEALTH CARE EDUCATION/TRAINING PROGRAM

## 2024-05-11 PROCEDURE — 99285 EMERGENCY DEPT VISIT HI MDM: CPT

## 2024-05-11 PROCEDURE — 80053 COMPREHEN METABOLIC PANEL: CPT

## 2024-05-11 PROCEDURE — 71045 X-RAY EXAM CHEST 1 VIEW: CPT

## 2024-05-11 PROCEDURE — 85025 COMPLETE CBC W/AUTO DIFF WBC: CPT

## 2024-05-11 PROCEDURE — 2580000003 HC RX 258: Performed by: STUDENT IN AN ORGANIZED HEALTH CARE EDUCATION/TRAINING PROGRAM

## 2024-05-11 RX ORDER — MEMANTINE HYDROCHLORIDE 10 MG/1
10 TABLET ORAL 2 TIMES DAILY
COMMUNITY

## 2024-05-11 RX ORDER — ACETAMINOPHEN 325 MG/1
650 TABLET ORAL EVERY 6 HOURS PRN
COMMUNITY

## 2024-05-11 RX ORDER — 0.9 % SODIUM CHLORIDE 0.9 %
1000 INTRAVENOUS SOLUTION INTRAVENOUS ONCE
Status: COMPLETED | OUTPATIENT
Start: 2024-05-11 | End: 2024-05-11

## 2024-05-11 RX ORDER — TRIAMTERENE AND HYDROCHLOROTHIAZIDE 37.5; 25 MG/1; MG/1
1 CAPSULE ORAL EVERY MORNING
COMMUNITY

## 2024-05-11 RX ADMIN — SODIUM CHLORIDE 1000 ML: 9 INJECTION, SOLUTION INTRAVENOUS at 04:59

## 2024-05-11 ASSESSMENT — PAIN - FUNCTIONAL ASSESSMENT: PAIN_FUNCTIONAL_ASSESSMENT: NONE - DENIES PAIN

## 2024-05-11 NOTE — ED PROVIDER NOTES
[facility] EMERGENCY DEPARTMENT  EMERGENCY DEPARTMENT ENCOUNTER        Pt Name: Jimena Rodriguez  MRN: [id number]  Birthdate 1950  Date of evaluation: 5/11/2024  Provider: Charis Gonzalez DO  PCP: No primary care provider on file.  Note Started: 4:44 AM EDT 5/11/24    CHIEF COMPLAINT       Chief Complaint   Patient presents with    Diarrhea     X 5. Per ecf from [id number]    ecf called ems for low spo2 81% on room air.     Per ecf they warmed patients hands her spo2 came up to 99% on room        HISTORY OF PRESENT ILLNESS: 1 or more Elements   History From: patient, family, ems    Limitations to history: None    Jimena Rodriguez is a 73 y.o. female with history of Alzheimer's, hypertension presenting to the emergency department via EMS from [facility] for hypoxia.  Apparently paramedics were called to the facility and her pulse ox was 70 to 80% on room air.  However, they state that her hands were extremely cold and when they got there she was 99% on room air soon as they warmed up her hands.  Apparently patient also had several episodes of diarrhea throughout the night.  This is not super uncommon for her after further discussion with the .  He states that this does happen sometimes.  Patient does not have any acute complaints at this time.  Denies any chest pain, shortness of breath, cough, congestion, fevers, chills, lightheadedness, dizziness, syncope, numbness, tingling, lower weakness, abdominal pain, nausea, vomiting, black or bloody stools, recent hospitalization, recent was, or other acute symptoms or concerns.    Nursing Notes were all reviewed and agreed with or any disagreements were addressed in the HPI.    REVIEW OF SYSTEMS :      Review of Systems    Positives and Pertinent negatives as per HPI.     SURGICAL HISTORY     Past Surgical History:   Procedure Laterality Date    CHOLECYSTECTOMY      HYSTERECTOMY (CERVIX STATUS UNKNOWN)

## 2024-05-14 LAB
EKG ATRIAL RATE: 51 BPM
EKG P AXIS: 75 DEGREES
EKG P-R INTERVAL: 174 MS
EKG Q-T INTERVAL: 470 MS
EKG QRS DURATION: 86 MS
EKG QTC CALCULATION (BAZETT): 433 MS
EKG R AXIS: -34 DEGREES
EKG T AXIS: 26 DEGREES
EKG VENTRICULAR RATE: 51 BPM

## 2024-05-14 PROCEDURE — 93010 ELECTROCARDIOGRAM REPORT: CPT | Performed by: INTERNAL MEDICINE

## 2024-08-20 ENCOUNTER — TELEPHONE (OUTPATIENT)
Dept: OTHER | Facility: CLINIC | Age: 74
End: 2024-08-20

## 2025-01-13 ENCOUNTER — APPOINTMENT (OUTPATIENT)
Dept: CT IMAGING | Age: 75
DRG: 870 | End: 2025-01-13
Payer: MEDICARE

## 2025-01-13 ENCOUNTER — APPOINTMENT (OUTPATIENT)
Dept: GENERAL RADIOLOGY | Age: 75
DRG: 870 | End: 2025-01-13
Payer: MEDICARE

## 2025-01-13 ENCOUNTER — HOSPITAL ENCOUNTER (INPATIENT)
Age: 75
LOS: 15 days | Discharge: HOME | DRG: 870 | End: 2025-01-28
Attending: EMERGENCY MEDICINE | Admitting: FAMILY MEDICINE
Payer: MEDICARE

## 2025-01-13 DIAGNOSIS — R62.7 FAILURE TO THRIVE IN ADULT: ICD-10-CM

## 2025-01-13 DIAGNOSIS — I46.9 CARDIAC ARREST: Primary | ICD-10-CM

## 2025-01-13 DIAGNOSIS — R09.2 RESPIRATORY ARREST (HCC): ICD-10-CM

## 2025-01-13 DIAGNOSIS — T17.908A ASPIRATION INTO AIRWAY, INITIAL ENCOUNTER: ICD-10-CM

## 2025-01-13 LAB
ALBUMIN SERPL-MCNC: 3.1 G/DL (ref 3.5–5.2)
ALP SERPL-CCNC: 188 U/L (ref 35–104)
ALT SERPL-CCNC: 21 U/L (ref 0–32)
ANION GAP SERPL CALCULATED.3IONS-SCNC: 18 MMOL/L (ref 7–16)
AST SERPL-CCNC: 46 U/L (ref 0–31)
B.E.: -2 MMOL/L (ref -3–3)
BASOPHILS # BLD: 0 K/UL (ref 0–0.2)
BASOPHILS NFR BLD: 0 % (ref 0–2)
BILIRUB SERPL-MCNC: 0.4 MG/DL (ref 0–1.2)
BNP SERPL-MCNC: 125 PG/ML (ref 0–125)
BUN SERPL-MCNC: 26 MG/DL (ref 6–23)
CALCIUM SERPL-MCNC: 8.3 MG/DL (ref 8.6–10.2)
CHLORIDE SERPL-SCNC: 101 MMOL/L (ref 98–107)
CO2 SERPL-SCNC: 22 MMOL/L (ref 22–29)
COHB: 0.4 % (ref 0–1.5)
COMMENT: ABNORMAL
CREAT SERPL-MCNC: 0.8 MG/DL (ref 0.5–1)
CRITICAL: ABNORMAL
DATE ANALYZED: ABNORMAL
DATE OF COLLECTION: ABNORMAL
EOSINOPHIL # BLD: 0.3 K/UL (ref 0.05–0.5)
EOSINOPHILS RELATIVE PERCENT: 3 % (ref 0–6)
ERYTHROCYTE [DISTWIDTH] IN BLOOD BY AUTOMATED COUNT: 14.8 % (ref 11.5–15)
GFR, ESTIMATED: 79 ML/MIN/1.73M2
GLUCOSE SERPL-MCNC: 144 MG/DL (ref 74–99)
HCO3: 25.8 MMOL/L (ref 22–26)
HCT VFR BLD AUTO: 43.1 % (ref 34–48)
HGB BLD-MCNC: 13.8 G/DL (ref 11.5–15.5)
HHB: 0.5 % (ref 0–5)
INR PPP: 1.2
LAB: ABNORMAL
LYMPHOCYTES NFR BLD: 5.06 K/UL (ref 1.5–4)
LYMPHOCYTES RELATIVE PERCENT: 44 % (ref 20–42)
Lab: 1744
MCH RBC QN AUTO: 31.1 PG (ref 26–35)
MCHC RBC AUTO-ENTMCNC: 32 G/DL (ref 32–34.5)
MCV RBC AUTO: 97.1 FL (ref 80–99.9)
METAMYELOCYTES ABSOLUTE COUNT: 0.1 K/UL (ref 0–0.12)
METAMYELOCYTES: 1 % (ref 0–1)
METHB: 0 % (ref 0–1.5)
MODE: ABNORMAL
MONOCYTES NFR BLD: 0.3 K/UL (ref 0.1–0.95)
MONOCYTES NFR BLD: 3 % (ref 2–12)
MYELOCYTES ABSOLUTE COUNT: 0.2 K/UL
MYELOCYTES: 2 %
NEUTROPHILS NFR BLD: 48 % (ref 43–80)
NEUTS SEG NFR BLD: 5.45 K/UL (ref 1.8–7.3)
O2 SATURATION: 99.5 % (ref 92–98.5)
O2HB: 99.1 % (ref 94–97)
OPERATOR ID: ABNORMAL
PARTIAL THROMBOPLASTIN TIME: 37.2 SEC (ref 24.5–35.1)
PATIENT TEMP: 37 C
PCO2: 57 MMHG (ref 35–45)
PH BLOOD GAS: 7.27 (ref 7.35–7.45)
PLATELET # BLD AUTO: 202 K/UL (ref 130–450)
PMV BLD AUTO: 11.4 FL (ref 7–12)
PO2: 237.2 MMHG (ref 75–100)
POTASSIUM SERPL-SCNC: 4.6 MMOL/L (ref 3.5–5)
PROT SERPL-MCNC: 6.9 G/DL (ref 6.4–8.3)
PROTHROMBIN TIME: 13.3 SEC (ref 9.3–12.4)
RBC # BLD AUTO: 4.44 M/UL (ref 3.5–5.5)
RBC # BLD: ABNORMAL 10*6/UL
SODIUM SERPL-SCNC: 141 MMOL/L (ref 132–146)
SOURCE, BLOOD GAS: ABNORMAL
THB: 13.7 G/DL (ref 11.5–16.5)
TIME ANALYZED: 1759
TROPONIN I SERPL HS-MCNC: 21 NG/L (ref 0–9)
WBC OTHER # BLD: 11.4 K/UL (ref 4.5–11.5)

## 2025-01-13 PROCEDURE — 99291 CRITICAL CARE FIRST HOUR: CPT

## 2025-01-13 PROCEDURE — 2580000003 HC RX 258

## 2025-01-13 PROCEDURE — 6360000002 HC RX W HCPCS

## 2025-01-13 PROCEDURE — 85610 PROTHROMBIN TIME: CPT

## 2025-01-13 PROCEDURE — 85025 COMPLETE CBC W/AUTO DIFF WBC: CPT

## 2025-01-13 PROCEDURE — 70450 CT HEAD/BRAIN W/O DYE: CPT

## 2025-01-13 PROCEDURE — 2580000003 HC RX 258: Performed by: EMERGENCY MEDICINE

## 2025-01-13 PROCEDURE — 96365 THER/PROPH/DIAG IV INF INIT: CPT

## 2025-01-13 PROCEDURE — 80053 COMPREHEN METABOLIC PANEL: CPT

## 2025-01-13 PROCEDURE — 0BH18EZ INSERTION OF ENDOTRACHEAL AIRWAY INTO TRACHEA, VIA NATURAL OR ARTIFICIAL OPENING ENDOSCOPIC: ICD-10-PCS | Performed by: EMERGENCY MEDICINE

## 2025-01-13 PROCEDURE — 02HV33Z INSERTION OF INFUSION DEVICE INTO SUPERIOR VENA CAVA, PERCUTANEOUS APPROACH: ICD-10-PCS | Performed by: STUDENT IN AN ORGANIZED HEALTH CARE EDUCATION/TRAINING PROGRAM

## 2025-01-13 PROCEDURE — 71045 X-RAY EXAM CHEST 1 VIEW: CPT

## 2025-01-13 PROCEDURE — 83880 ASSAY OF NATRIURETIC PEPTIDE: CPT

## 2025-01-13 PROCEDURE — 71275 CT ANGIOGRAPHY CHEST: CPT

## 2025-01-13 PROCEDURE — 99285 EMERGENCY DEPT VISIT HI MDM: CPT

## 2025-01-13 PROCEDURE — 3E033XZ INTRODUCTION OF VASOPRESSOR INTO PERIPHERAL VEIN, PERCUTANEOUS APPROACH: ICD-10-PCS | Performed by: EMERGENCY MEDICINE

## 2025-01-13 PROCEDURE — 6360000002 HC RX W HCPCS: Performed by: EMERGENCY MEDICINE

## 2025-01-13 PROCEDURE — 31500 INSERT EMERGENCY AIRWAY: CPT

## 2025-01-13 PROCEDURE — 5A1955Z RESPIRATORY VENTILATION, GREATER THAN 96 CONSECUTIVE HOURS: ICD-10-PCS | Performed by: EMERGENCY MEDICINE

## 2025-01-13 PROCEDURE — 2500000003 HC RX 250 WO HCPCS: Performed by: EMERGENCY MEDICINE

## 2025-01-13 PROCEDURE — 85730 THROMBOPLASTIN TIME PARTIAL: CPT

## 2025-01-13 PROCEDURE — 96368 THER/DIAG CONCURRENT INF: CPT

## 2025-01-13 PROCEDURE — 2000000000 HC ICU R&B

## 2025-01-13 PROCEDURE — 82805 BLOOD GASES W/O2 SATURATION: CPT

## 2025-01-13 PROCEDURE — 84484 ASSAY OF TROPONIN QUANT: CPT

## 2025-01-13 PROCEDURE — 74177 CT ABD & PELVIS W/CONTRAST: CPT

## 2025-01-13 PROCEDURE — 96366 THER/PROPH/DIAG IV INF ADDON: CPT

## 2025-01-13 PROCEDURE — 99222 1ST HOSP IP/OBS MODERATE 55: CPT | Performed by: FAMILY MEDICINE

## 2025-01-13 PROCEDURE — 6360000004 HC RX CONTRAST MEDICATION: Performed by: RADIOLOGY

## 2025-01-13 PROCEDURE — 96375 TX/PRO/DX INJ NEW DRUG ADDON: CPT

## 2025-01-13 PROCEDURE — 87040 BLOOD CULTURE FOR BACTERIA: CPT

## 2025-01-13 PROCEDURE — 93005 ELECTROCARDIOGRAM TRACING: CPT

## 2025-01-13 RX ORDER — EPINEPHRINE IN SOD CHLOR,ISO 1 MG/10 ML
SYRINGE (ML) INTRAVENOUS DAILY PRN
Status: COMPLETED | OUTPATIENT
Start: 2025-01-13 | End: 2025-01-13

## 2025-01-13 RX ORDER — FENTANYL CITRATE-0.9 % NACL/PF 10 MCG/ML
25-200 PLASTIC BAG, INJECTION (ML) INTRAVENOUS CONTINUOUS
Status: DISCONTINUED | OUTPATIENT
Start: 2025-01-13 | End: 2025-01-16

## 2025-01-13 RX ORDER — NOREPINEPHRINE BITARTRATE 0.06 MG/ML
1-100 INJECTION, SOLUTION INTRAVENOUS CONTINUOUS
Status: DISCONTINUED | OUTPATIENT
Start: 2025-01-13 | End: 2025-01-16

## 2025-01-13 RX ORDER — MIDAZOLAM HYDROCHLORIDE 2 MG/2ML
4 INJECTION, SOLUTION INTRAMUSCULAR; INTRAVENOUS ONCE
Status: COMPLETED | OUTPATIENT
Start: 2025-01-13 | End: 2025-01-13

## 2025-01-13 RX ORDER — ATROPINE SULFATE 0.1 MG/ML
1 INJECTION INTRAVENOUS ONCE
Status: COMPLETED | OUTPATIENT
Start: 2025-01-13 | End: 2025-01-13

## 2025-01-13 RX ORDER — SODIUM CHLORIDE 0.9 % (FLUSH) 0.9 %
10 SYRINGE (ML) INJECTION PRN
Status: DISCONTINUED | OUTPATIENT
Start: 2025-01-13 | End: 2025-01-28 | Stop reason: HOSPADM

## 2025-01-13 RX ORDER — IOPAMIDOL 755 MG/ML
75 INJECTION, SOLUTION INTRAVASCULAR
Status: COMPLETED | OUTPATIENT
Start: 2025-01-13 | End: 2025-01-13

## 2025-01-13 RX ORDER — FENTANYL CITRATE-0.9 % NACL/PF 20 MCG/2ML
50 SYRINGE (ML) INTRAVENOUS EVERY 30 MIN PRN
Status: DISCONTINUED | OUTPATIENT
Start: 2025-01-13 | End: 2025-01-16

## 2025-01-13 RX ORDER — 0.9 % SODIUM CHLORIDE 0.9 %
2000 INTRAVENOUS SOLUTION INTRAVENOUS ONCE
Status: COMPLETED | OUTPATIENT
Start: 2025-01-13 | End: 2025-01-13

## 2025-01-13 RX ORDER — PROPOFOL 10 MG/ML
5-50 INJECTION, EMULSION INTRAVENOUS CONTINUOUS
Status: DISCONTINUED | OUTPATIENT
Start: 2025-01-13 | End: 2025-01-13

## 2025-01-13 RX ORDER — PROPOFOL 10 MG/ML
INJECTION, EMULSION INTRAVENOUS
Status: DISPENSED
Start: 2025-01-13 | End: 2025-01-14

## 2025-01-13 RX ORDER — FENTANYL CITRATE 50 UG/ML
100 INJECTION, SOLUTION INTRAMUSCULAR; INTRAVENOUS ONCE
Status: DISCONTINUED | OUTPATIENT
Start: 2025-01-13 | End: 2025-01-14

## 2025-01-13 RX ORDER — MIDAZOLAM HYDROCHLORIDE 2 MG/2ML
2 INJECTION, SOLUTION INTRAMUSCULAR; INTRAVENOUS ONCE
Status: COMPLETED | OUTPATIENT
Start: 2025-01-13 | End: 2025-01-13

## 2025-01-13 RX ORDER — KETAMINE HYDROCHLORIDE 10 MG/ML
100 INJECTION, SOLUTION INTRAMUSCULAR; INTRAVENOUS ONCE
Status: COMPLETED | OUTPATIENT
Start: 2025-01-13 | End: 2025-01-13

## 2025-01-13 RX ADMIN — IOPAMIDOL 75 ML: 755 INJECTION, SOLUTION INTRAVENOUS at 19:37

## 2025-01-13 RX ADMIN — MIDAZOLAM 2 MG: 1 INJECTION INTRAMUSCULAR; INTRAVENOUS at 22:38

## 2025-01-13 RX ADMIN — SODIUM BICARBONATE 50 MEQ: 84 INJECTION, SOLUTION INTRAVENOUS at 17:39

## 2025-01-13 RX ADMIN — Medication 50 MCG: at 22:39

## 2025-01-13 RX ADMIN — Medication 50 MCG: at 20:48

## 2025-01-13 RX ADMIN — KETAMINE HYDROCHLORIDE 100 MG: 10 INJECTION INTRAMUSCULAR; INTRAVENOUS at 19:21

## 2025-01-13 RX ADMIN — PIPERACILLIN AND TAZOBACTAM 4500 MG: 4; .5 INJECTION, POWDER, FOR SOLUTION INTRAVENOUS at 19:07

## 2025-01-13 RX ADMIN — Medication 50 MCG/HR: at 18:23

## 2025-01-13 RX ADMIN — Medication 15 MCG/MIN: at 18:15

## 2025-01-13 RX ADMIN — Medication 1 MG: at 17:36

## 2025-01-13 RX ADMIN — SODIUM CHLORIDE 2000 ML: 9 INJECTION, SOLUTION INTRAVENOUS at 18:17

## 2025-01-13 RX ADMIN — MIDAZOLAM 4 MG: 1 INJECTION INTRAMUSCULAR; INTRAVENOUS at 20:54

## 2025-01-13 RX ADMIN — Medication 50 MCG: at 18:26

## 2025-01-13 RX ADMIN — ATROPINE SULFATE 1 MG: 0.1 INJECTION INTRAVENOUS at 19:01

## 2025-01-13 RX ADMIN — FENTANYL CITRATE 100 MCG: 50 INJECTION, SOLUTION INTRAMUSCULAR; INTRAVENOUS at 18:46

## 2025-01-13 ASSESSMENT — PULMONARY FUNCTION TESTS
PIF_VALUE: 35
PIF_VALUE: 37
PIF_VALUE: 36

## 2025-01-13 NOTE — CODE DOCUMENTATION
Patient arrived via EMS , patient went unresp at NH at dinner , patient intubated PTA  , food noted around ETT

## 2025-01-13 NOTE — ED PROVIDER NOTES
[facility] EMERGENCY DEPARTMENT  EMERGENCY DEPARTMENT ENCOUNTER        Pt Name: Jimena Rodriguez  MRN: [id number]  Birthdate 1950  Date of evaluation: 1/13/2025  Provider: Omar Perera II, DO  PCP: No primary care provider on file.  Note Started: 5:43 PM EST 1/13/25    CHIEF COMPLAINT       Chief Complaint   Patient presents with    Choking     Per ems patient was eating and had a witnessed seizure. EMS was pacing patient and intubated prior to arrival.        HISTORY OF PRESENT ILLNESS: 1 or more Elements            Jimena Rodriguez is a 74 y.o. female history of dementia, HTN, brought in by EMS after seizure-like activity.  Patient was apparently eating dinner, had seizure-like activity and went unresponsive.  Patient was intubated by EMS in the field.  Patient was poorly responsive on ER arrival    Nursing Notes were all reviewed and agreed with or any disagreements were addressed in the HPI.      REVIEW OF EXTERNAL NOTE :       Records reviewed for medical hx, surgical, hx, and medication lists      Chart Review/External Note Review    Last Echo reviewed by Me:  No results found for: \"LVEF\", \"LVEFMODE\"          Controlled Substance Monitoring:    Acute and Chronic Pain Monitoring:        No data to display                    REVIEW OF SYSTEMS :      Positives and Pertinent negatives as per HPI.     SURGICAL HISTORY     Past Surgical History:   Procedure Laterality Date    CHOLECYSTECTOMY      HYSTERECTOMY (CERVIX STATUS UNKNOWN)      SKIN BIOPSY         CURRENTMEDICATIONS       Previous Medications    ACETAMINOPHEN (TYLENOL) 325 MG TABLET    Take 2 tablets by mouth every 6 hours as needed for Pain    DIVALPROEX (DEPAKOTE SPRINKLES) 125 MG DR CAPSULE    Take 1 capsule by mouth 2 times daily    DONEPEZIL (ARICEPT) 10 MG TABLET    Take 1 tablet by mouth nightly    MAGNESIUM HYDROXIDE (MILK OF MAGNESIA) 400 MG/5ML SUSPENSION    Take 30 mLs by mouth daily as needed for

## 2025-01-14 ENCOUNTER — APPOINTMENT (OUTPATIENT)
Dept: GENERAL RADIOLOGY | Age: 75
DRG: 870 | End: 2025-01-14
Payer: MEDICARE

## 2025-01-14 ENCOUNTER — APPOINTMENT (OUTPATIENT)
Age: 75
DRG: 870 | End: 2025-01-14
Payer: MEDICARE

## 2025-01-14 ENCOUNTER — APPOINTMENT (OUTPATIENT)
Dept: NEUROLOGY | Age: 75
DRG: 870 | End: 2025-01-14
Payer: MEDICARE

## 2025-01-14 PROBLEM — R57.9 SHOCK: Status: ACTIVE | Noted: 2025-01-14

## 2025-01-14 PROBLEM — I46.9 CARDIAC ARREST: Status: ACTIVE | Noted: 2025-01-14

## 2025-01-14 LAB
AADO2: 137.6 MMHG
AADO2: 345.5 MMHG
ALBUMIN SERPL-MCNC: 3 G/DL (ref 3.5–5.2)
ALP SERPL-CCNC: 157 U/L (ref 35–104)
ALT SERPL-CCNC: 26 U/L (ref 0–32)
ANION GAP SERPL CALCULATED.3IONS-SCNC: 14 MMOL/L (ref 7–16)
AST SERPL-CCNC: 48 U/L (ref 0–31)
B PARAP IS1001 DNA NPH QL NAA+NON-PROBE: NOT DETECTED
B PERT DNA SPEC QL NAA+PROBE: NOT DETECTED
B.E.: -0.5 MMOL/L (ref -3–3)
B.E.: -0.6 MMOL/L (ref -3–3)
BASOPHILS # BLD: 0.06 K/UL (ref 0–0.2)
BASOPHILS NFR BLD: 0 % (ref 0–2)
BILIRUB SERPL-MCNC: 0.5 MG/DL (ref 0–1.2)
BUN SERPL-MCNC: 27 MG/DL (ref 6–23)
C PNEUM DNA NPH QL NAA+NON-PROBE: NOT DETECTED
CALCIUM SERPL-MCNC: 8.6 MG/DL (ref 8.6–10.2)
CHLORIDE SERPL-SCNC: 103 MMOL/L (ref 98–107)
CHOLEST SERPL-MCNC: 131 MG/DL
CO2 SERPL-SCNC: 24 MMOL/L (ref 22–29)
COHB: 0.4 % (ref 0–1.5)
COHB: 0.6 % (ref 0–1.5)
CREAT SERPL-MCNC: 0.7 MG/DL (ref 0.5–1)
CRITICAL: ABNORMAL
CRITICAL: ABNORMAL
DATE ANALYZED: ABNORMAL
DATE ANALYZED: ABNORMAL
DATE OF COLLECTION: ABNORMAL
DATE OF COLLECTION: ABNORMAL
EOSINOPHIL # BLD: 0 K/UL (ref 0.05–0.5)
EOSINOPHILS RELATIVE PERCENT: 0 % (ref 0–6)
ERYTHROCYTE [DISTWIDTH] IN BLOOD BY AUTOMATED COUNT: 14.6 % (ref 11.5–15)
FIO2: 40 %
FIO2: 80 %
FLUAV RNA NPH QL NAA+NON-PROBE: NOT DETECTED
FLUBV RNA NPH QL NAA+NON-PROBE: NOT DETECTED
GFR, ESTIMATED: 85 ML/MIN/1.73M2
GLUCOSE SERPL-MCNC: 145 MG/DL (ref 74–99)
HADV DNA NPH QL NAA+NON-PROBE: NOT DETECTED
HCO3: 21.3 MMOL/L (ref 22–26)
HCO3: 21.7 MMOL/L (ref 22–26)
HCOV 229E RNA NPH QL NAA+NON-PROBE: NOT DETECTED
HCOV HKU1 RNA NPH QL NAA+NON-PROBE: NOT DETECTED
HCOV NL63 RNA NPH QL NAA+NON-PROBE: DETECTED
HCOV OC43 RNA NPH QL NAA+NON-PROBE: NOT DETECTED
HCT VFR BLD AUTO: 39.4 % (ref 34–48)
HDLC SERPL-MCNC: 54 MG/DL
HGB BLD-MCNC: 13.8 G/DL (ref 11.5–15.5)
HHB: 0.7 % (ref 0–5)
HHB: 1.7 % (ref 0–5)
HMPV RNA NPH QL NAA+NON-PROBE: NOT DETECTED
HPIV1 RNA NPH QL NAA+NON-PROBE: NOT DETECTED
HPIV2 RNA NPH QL NAA+NON-PROBE: NOT DETECTED
HPIV3 RNA NPH QL NAA+NON-PROBE: NOT DETECTED
HPIV4 RNA NPH QL NAA+NON-PROBE: NOT DETECTED
IMM GRANULOCYTES # BLD AUTO: 0.25 K/UL (ref 0–0.58)
IMM GRANULOCYTES NFR BLD: 1 % (ref 0–5)
INR PPP: 1.3
LAB: ABNORMAL
LAB: ABNORMAL
LACTATE BLDV-SCNC: 2.6 MMOL/L (ref 0.5–2.2)
LACTATE BLDV-SCNC: 2.9 MMOL/L (ref 0.5–2.2)
LACTATE BLDV-SCNC: 4.7 MMOL/L (ref 0.5–2.2)
LDLC SERPL CALC-MCNC: 65 MG/DL
LYMPHOCYTES NFR BLD: 0.79 K/UL (ref 1.5–4)
LYMPHOCYTES RELATIVE PERCENT: 3 % (ref 20–42)
Lab: 237
Lab: 455
M PNEUMO DNA NPH QL NAA+NON-PROBE: NOT DETECTED
MAGNESIUM SERPL-MCNC: 1.9 MG/DL (ref 1.6–2.6)
MCH RBC QN AUTO: 31.4 PG (ref 26–35)
MCHC RBC AUTO-ENTMCNC: 35 G/DL (ref 32–34.5)
MCV RBC AUTO: 89.7 FL (ref 80–99.9)
METHB: 0.4 % (ref 0–1.5)
METHB: 0.5 % (ref 0–1.5)
MODE: AC
MODE: AC
MONOCYTES NFR BLD: 1.16 K/UL (ref 0.1–0.95)
MONOCYTES NFR BLD: 4 % (ref 2–12)
NEUTROPHILS NFR BLD: 92 % (ref 43–80)
NEUTS SEG NFR BLD: 27.05 K/UL (ref 1.8–7.3)
O2 SATURATION: 98.3 % (ref 92–98.5)
O2 SATURATION: 99.3 % (ref 92–98.5)
O2HB: 97.3 % (ref 94–97)
O2HB: 98.4 % (ref 94–97)
OPERATOR ID: 2593
OPERATOR ID: 7221
PATIENT TEMP: 37 C
PATIENT TEMP: 37 C
PCO2: 28.3 MMHG (ref 35–45)
PCO2: 29.5 MMHG (ref 35–45)
PEEP/CPAP: 8 CMH2O
PEEP/CPAP: 8 CMH2O
PFO2: 2.44 MMHG/%
PFO2: 2.84 MMHG/%
PH BLOOD GAS: 7.49 (ref 7.35–7.45)
PH BLOOD GAS: 7.49 (ref 7.35–7.45)
PLATELET # BLD AUTO: 211 K/UL (ref 130–450)
PMV BLD AUTO: 11.1 FL (ref 7–12)
PO2: 113.7 MMHG (ref 75–100)
PO2: 195.2 MMHG (ref 75–100)
POTASSIUM SERPL-SCNC: 4.5 MMOL/L (ref 3.5–5)
PROCALCITONIN SERPL-MCNC: 1.09 NG/ML (ref 0–0.08)
PROT SERPL-MCNC: 6.7 G/DL (ref 6.4–8.3)
PROTHROMBIN TIME: 13.8 SEC (ref 9.3–12.4)
RBC # BLD AUTO: 4.39 M/UL (ref 3.5–5.5)
RBC # BLD: ABNORMAL 10*6/UL
RI(T): 1.21
RI(T): 1.77
RR MECHANICAL: 14 B/MIN
RR MECHANICAL: 18 B/MIN
RSV RNA NPH QL NAA+NON-PROBE: NOT DETECTED
RV+EV RNA NPH QL NAA+NON-PROBE: NOT DETECTED
SARS-COV-2 RNA NPH QL NAA+NON-PROBE: NOT DETECTED
SODIUM SERPL-SCNC: 141 MMOL/L (ref 132–146)
SOURCE, BLOOD GAS: ABNORMAL
SOURCE, BLOOD GAS: ABNORMAL
SPECIMEN DESCRIPTION: ABNORMAL
THB: 14.5 G/DL (ref 11.5–16.5)
THB: 15.3 G/DL (ref 11.5–16.5)
TIME ANALYZED: 249
TIME ANALYZED: 459
TRIGL SERPL-MCNC: 61 MG/DL
TROPONIN I SERPL HS-MCNC: 257 NG/L (ref 0–9)
TROPONIN I SERPL HS-MCNC: 270 NG/L (ref 0–9)
TROPONIN I SERPL HS-MCNC: 278 NG/L (ref 0–9)
TSH SERPL DL<=0.05 MIU/L-ACNC: 22.74 UIU/ML (ref 0.27–4.2)
VLDLC SERPL CALC-MCNC: 12 MG/DL
VT MECHANICAL: 450 ML
VT MECHANICAL: 450 ML
WBC OTHER # BLD: 29.3 K/UL (ref 4.5–11.5)

## 2025-01-14 PROCEDURE — 84443 ASSAY THYROID STIM HORMONE: CPT

## 2025-01-14 PROCEDURE — 94002 VENT MGMT INPAT INIT DAY: CPT

## 2025-01-14 PROCEDURE — 31624 DX BRONCHOSCOPE/LAVAGE: CPT

## 2025-01-14 PROCEDURE — 6360000002 HC RX W HCPCS

## 2025-01-14 PROCEDURE — 94664 DEMO&/EVAL PT USE INHALER: CPT

## 2025-01-14 PROCEDURE — 74018 RADEX ABDOMEN 1 VIEW: CPT

## 2025-01-14 PROCEDURE — 0202U NFCT DS 22 TRGT SARS-COV-2: CPT

## 2025-01-14 PROCEDURE — 36620 INSERTION CATHETER ARTERY: CPT

## 2025-01-14 PROCEDURE — 84145 PROCALCITONIN (PCT): CPT

## 2025-01-14 PROCEDURE — 99291 CRITICAL CARE FIRST HOUR: CPT | Performed by: INTERNAL MEDICINE

## 2025-01-14 PROCEDURE — 0B9F8ZX DRAINAGE OF RIGHT LOWER LUNG LOBE, VIA NATURAL OR ARTIFICIAL OPENING ENDOSCOPIC, DIAGNOSTIC: ICD-10-PCS | Performed by: INTERNAL MEDICINE

## 2025-01-14 PROCEDURE — 93306 TTE W/DOPPLER COMPLETE: CPT

## 2025-01-14 PROCEDURE — 2580000003 HC RX 258

## 2025-01-14 PROCEDURE — 2580000003 HC RX 258: Performed by: INTERNAL MEDICINE

## 2025-01-14 PROCEDURE — 85025 COMPLETE CBC W/AUTO DIFF WBC: CPT

## 2025-01-14 PROCEDURE — 99223 1ST HOSP IP/OBS HIGH 75: CPT | Performed by: INTERNAL MEDICINE

## 2025-01-14 PROCEDURE — 6370000000 HC RX 637 (ALT 250 FOR IP)

## 2025-01-14 PROCEDURE — 2000000000 HC ICU R&B

## 2025-01-14 PROCEDURE — 6360000002 HC RX W HCPCS: Performed by: INTERNAL MEDICINE

## 2025-01-14 PROCEDURE — 2500000003 HC RX 250 WO HCPCS

## 2025-01-14 PROCEDURE — 85610 PROTHROMBIN TIME: CPT

## 2025-01-14 PROCEDURE — 0BCB8ZZ EXTIRPATION OF MATTER FROM LEFT LOWER LOBE BRONCHUS, VIA NATURAL OR ARTIFICIAL OPENING ENDOSCOPIC: ICD-10-PCS | Performed by: INTERNAL MEDICINE

## 2025-01-14 PROCEDURE — 84484 ASSAY OF TROPONIN QUANT: CPT

## 2025-01-14 PROCEDURE — 94640 AIRWAY INHALATION TREATMENT: CPT

## 2025-01-14 PROCEDURE — 82805 BLOOD GASES W/O2 SATURATION: CPT

## 2025-01-14 PROCEDURE — 2500000003 HC RX 250 WO HCPCS: Performed by: EMERGENCY MEDICINE

## 2025-01-14 PROCEDURE — 89051 BODY FLUID CELL COUNT: CPT

## 2025-01-14 PROCEDURE — 95822 EEG COMA OR SLEEP ONLY: CPT | Performed by: PSYCHIATRY & NEUROLOGY

## 2025-01-14 PROCEDURE — 87070 CULTURE OTHR SPECIMN AEROBIC: CPT

## 2025-01-14 PROCEDURE — 4A133B1 MONITORING OF ARTERIAL PRESSURE, PERIPHERAL, PERCUTANEOUS APPROACH: ICD-10-PCS | Performed by: STUDENT IN AN ORGANIZED HEALTH CARE EDUCATION/TRAINING PROGRAM

## 2025-01-14 PROCEDURE — 87077 CULTURE AEROBIC IDENTIFY: CPT

## 2025-01-14 PROCEDURE — 95822 EEG COMA OR SLEEP ONLY: CPT

## 2025-01-14 PROCEDURE — 93005 ELECTROCARDIOGRAM TRACING: CPT

## 2025-01-14 PROCEDURE — 80053 COMPREHEN METABOLIC PANEL: CPT

## 2025-01-14 PROCEDURE — 80061 LIPID PANEL: CPT

## 2025-01-14 PROCEDURE — 31624 DX BRONCHOSCOPE/LAVAGE: CPT | Performed by: INTERNAL MEDICINE

## 2025-01-14 PROCEDURE — 71045 X-RAY EXAM CHEST 1 VIEW: CPT

## 2025-01-14 PROCEDURE — 89220 SPUTUM SPECIMEN COLLECTION: CPT

## 2025-01-14 PROCEDURE — 0B9J8ZX DRAINAGE OF LEFT LOWER LUNG LOBE, VIA NATURAL OR ARTIFICIAL OPENING ENDOSCOPIC, DIAGNOSTIC: ICD-10-PCS | Performed by: INTERNAL MEDICINE

## 2025-01-14 PROCEDURE — 87081 CULTURE SCREEN ONLY: CPT

## 2025-01-14 PROCEDURE — 0BC68ZZ EXTIRPATION OF MATTER FROM RIGHT LOWER LOBE BRONCHUS, VIA NATURAL OR ARTIFICIAL OPENING ENDOSCOPIC: ICD-10-PCS | Performed by: INTERNAL MEDICINE

## 2025-01-14 PROCEDURE — 31645 BRNCHSC W/THER ASPIR 1ST: CPT | Performed by: INTERNAL MEDICINE

## 2025-01-14 PROCEDURE — 87205 SMEAR GRAM STAIN: CPT

## 2025-01-14 PROCEDURE — 83605 ASSAY OF LACTIC ACID: CPT

## 2025-01-14 PROCEDURE — 83735 ASSAY OF MAGNESIUM: CPT

## 2025-01-14 PROCEDURE — 2709999900 HC NON-CHARGEABLE SUPPLY

## 2025-01-14 RX ORDER — PROCHLORPERAZINE EDISYLATE 5 MG/ML
10 INJECTION INTRAMUSCULAR; INTRAVENOUS EVERY 6 HOURS PRN
Status: DISCONTINUED | OUTPATIENT
Start: 2025-01-14 | End: 2025-01-28 | Stop reason: HOSPADM

## 2025-01-14 RX ORDER — CHLORHEXIDINE GLUCONATE ORAL RINSE 1.2 MG/ML
15 SOLUTION DENTAL 2 TIMES DAILY
Status: DISCONTINUED | OUTPATIENT
Start: 2025-01-14 | End: 2025-01-28 | Stop reason: HOSPADM

## 2025-01-14 RX ORDER — ATROPINE SULFATE 0.1 MG/ML
1 INJECTION INTRAVENOUS ONCE
Status: COMPLETED | OUTPATIENT
Start: 2025-01-14 | End: 2025-01-14

## 2025-01-14 RX ORDER — SODIUM CHLORIDE 0.9 % (FLUSH) 0.9 %
5-40 SYRINGE (ML) INJECTION EVERY 12 HOURS SCHEDULED
Status: DISCONTINUED | OUTPATIENT
Start: 2025-01-14 | End: 2025-01-28 | Stop reason: HOSPADM

## 2025-01-14 RX ORDER — ACETAMINOPHEN 325 MG/1
650 TABLET ORAL EVERY 6 HOURS PRN
Status: DISCONTINUED | OUTPATIENT
Start: 2025-01-14 | End: 2025-01-28 | Stop reason: HOSPADM

## 2025-01-14 RX ORDER — PROPOFOL 10 MG/ML
INJECTION, EMULSION INTRAVENOUS
Status: COMPLETED
Start: 2025-01-14 | End: 2025-01-14

## 2025-01-14 RX ORDER — IPRATROPIUM BROMIDE AND ALBUTEROL SULFATE 2.5; .5 MG/3ML; MG/3ML
1 SOLUTION RESPIRATORY (INHALATION)
Status: DISCONTINUED | OUTPATIENT
Start: 2025-01-14 | End: 2025-01-14

## 2025-01-14 RX ORDER — ACETAMINOPHEN 650 MG/1
650 SUPPOSITORY RECTAL EVERY 6 HOURS PRN
Status: DISCONTINUED | OUTPATIENT
Start: 2025-01-14 | End: 2025-01-28 | Stop reason: HOSPADM

## 2025-01-14 RX ORDER — ATROPINE SULFATE 0.1 MG/ML
1 INJECTION INTRAVENOUS PRN
Status: DISCONTINUED | OUTPATIENT
Start: 2025-01-14 | End: 2025-01-28 | Stop reason: HOSPADM

## 2025-01-14 RX ORDER — ENOXAPARIN SODIUM 100 MG/ML
40 INJECTION SUBCUTANEOUS DAILY
Status: DISCONTINUED | OUTPATIENT
Start: 2025-01-14 | End: 2025-01-28 | Stop reason: HOSPADM

## 2025-01-14 RX ORDER — IPRATROPIUM BROMIDE AND ALBUTEROL SULFATE 2.5; .5 MG/3ML; MG/3ML
1 SOLUTION RESPIRATORY (INHALATION)
Status: DISCONTINUED | OUTPATIENT
Start: 2025-01-14 | End: 2025-01-28 | Stop reason: HOSPADM

## 2025-01-14 RX ORDER — POTASSIUM CHLORIDE 29.8 MG/ML
20 INJECTION INTRAVENOUS PRN
Status: DISCONTINUED | OUTPATIENT
Start: 2025-01-14 | End: 2025-01-28 | Stop reason: HOSPADM

## 2025-01-14 RX ORDER — MAGNESIUM SULFATE IN WATER 40 MG/ML
2000 INJECTION, SOLUTION INTRAVENOUS PRN
Status: DISCONTINUED | OUTPATIENT
Start: 2025-01-14 | End: 2025-01-28 | Stop reason: HOSPADM

## 2025-01-14 RX ORDER — HYDROCORTISONE SODIUM SUCCINATE 100 MG/2ML
100 INJECTION INTRAMUSCULAR; INTRAVENOUS EVERY 8 HOURS
Status: DISCONTINUED | OUTPATIENT
Start: 2025-01-14 | End: 2025-01-16

## 2025-01-14 RX ORDER — MAGNESIUM SULFATE 1 G/100ML
1000 INJECTION INTRAVENOUS ONCE
Status: COMPLETED | OUTPATIENT
Start: 2025-01-14 | End: 2025-01-14

## 2025-01-14 RX ORDER — SODIUM CHLORIDE 9 MG/ML
INJECTION, SOLUTION INTRAVENOUS PRN
Status: DISCONTINUED | OUTPATIENT
Start: 2025-01-14 | End: 2025-01-22

## 2025-01-14 RX ORDER — ONDANSETRON 4 MG/1
4 TABLET, ORALLY DISINTEGRATING ORAL EVERY 8 HOURS PRN
Status: DISCONTINUED | OUTPATIENT
Start: 2025-01-14 | End: 2025-01-14 | Stop reason: CLARIF

## 2025-01-14 RX ORDER — PROCHLORPERAZINE MALEATE 10 MG
5 TABLET ORAL EVERY 6 HOURS PRN
Status: DISCONTINUED | OUTPATIENT
Start: 2025-01-14 | End: 2025-01-28 | Stop reason: HOSPADM

## 2025-01-14 RX ORDER — MIDAZOLAM HYDROCHLORIDE 2 MG/2ML
4 INJECTION, SOLUTION INTRAMUSCULAR; INTRAVENOUS ONCE
Status: COMPLETED | OUTPATIENT
Start: 2025-01-14 | End: 2025-01-14

## 2025-01-14 RX ORDER — LEVETIRACETAM 500 MG/5ML
1000 INJECTION, SOLUTION, CONCENTRATE INTRAVENOUS EVERY 12 HOURS
Status: DISCONTINUED | OUTPATIENT
Start: 2025-01-14 | End: 2025-01-14

## 2025-01-14 RX ORDER — SODIUM CHLORIDE 0.9 % (FLUSH) 0.9 %
5-40 SYRINGE (ML) INJECTION PRN
Status: DISCONTINUED | OUTPATIENT
Start: 2025-01-14 | End: 2025-01-28 | Stop reason: HOSPADM

## 2025-01-14 RX ORDER — POTASSIUM CHLORIDE 7.45 MG/ML
10 INJECTION INTRAVENOUS PRN
Status: DISCONTINUED | OUTPATIENT
Start: 2025-01-14 | End: 2025-01-28 | Stop reason: HOSPADM

## 2025-01-14 RX ORDER — LEVOTHYROXINE SODIUM 25 UG/1
25 TABLET ORAL DAILY
Status: DISCONTINUED | OUTPATIENT
Start: 2025-01-15 | End: 2025-01-28 | Stop reason: HOSPADM

## 2025-01-14 RX ORDER — ONDANSETRON 2 MG/ML
4 INJECTION INTRAMUSCULAR; INTRAVENOUS EVERY 6 HOURS PRN
Status: DISCONTINUED | OUTPATIENT
Start: 2025-01-14 | End: 2025-01-14 | Stop reason: CLARIF

## 2025-01-14 RX ORDER — IPRATROPIUM BROMIDE AND ALBUTEROL SULFATE 2.5; .5 MG/3ML; MG/3ML
1 SOLUTION RESPIRATORY (INHALATION) EVERY 4 HOURS PRN
Status: DISCONTINUED | OUTPATIENT
Start: 2025-01-14 | End: 2025-01-14

## 2025-01-14 RX ORDER — POLYETHYLENE GLYCOL 3350 17 G/17G
17 POWDER, FOR SOLUTION ORAL DAILY PRN
Status: DISCONTINUED | OUTPATIENT
Start: 2025-01-14 | End: 2025-01-28 | Stop reason: HOSPADM

## 2025-01-14 RX ADMIN — IPRATROPIUM BROMIDE AND ALBUTEROL SULFATE 1 DOSE: 2.5; .5 SOLUTION RESPIRATORY (INHALATION) at 20:34

## 2025-01-14 RX ADMIN — SODIUM CHLORIDE 1500 MG: 9 INJECTION, SOLUTION INTRAVENOUS at 03:00

## 2025-01-14 RX ADMIN — Medication 15 ML: at 20:14

## 2025-01-14 RX ADMIN — HYDROCORTISONE SODIUM SUCCINATE 100 MG: 100 INJECTION, POWDER, FOR SOLUTION INTRAMUSCULAR; INTRAVENOUS at 17:54

## 2025-01-14 RX ADMIN — SODIUM CHLORIDE, PRESERVATIVE FREE 10 ML: 5 INJECTION INTRAVENOUS at 20:14

## 2025-01-14 RX ADMIN — Medication 50 MCG: at 00:20

## 2025-01-14 RX ADMIN — MAGNESIUM SULFATE HEPTAHYDRATE 1000 MG: 1 INJECTION, SOLUTION INTRAVENOUS at 07:04

## 2025-01-14 RX ADMIN — Medication 15 ML: at 08:38

## 2025-01-14 RX ADMIN — IPRATROPIUM BROMIDE AND ALBUTEROL SULFATE 1 DOSE: 2.5; .5 SOLUTION RESPIRATORY (INHALATION) at 12:43

## 2025-01-14 RX ADMIN — Medication 12 MCG/MIN: at 07:19

## 2025-01-14 RX ADMIN — PIPERACILLIN AND TAZOBACTAM 4500 MG: 4; .5 INJECTION, POWDER, LYOPHILIZED, FOR SOLUTION INTRAVENOUS at 17:55

## 2025-01-14 RX ADMIN — PROPOFOL 100 MG: 10 INJECTION, EMULSION INTRAVENOUS at 16:04

## 2025-01-14 RX ADMIN — MIDAZOLAM 4 MG: 1 INJECTION INTRAMUSCULAR; INTRAVENOUS at 16:02

## 2025-01-14 RX ADMIN — SODIUM CHLORIDE, PRESERVATIVE FREE 20 MG: 5 INJECTION INTRAVENOUS at 03:03

## 2025-01-14 RX ADMIN — SODIUM CHLORIDE, PRESERVATIVE FREE 10 ML: 5 INJECTION INTRAVENOUS at 08:41

## 2025-01-14 RX ADMIN — LEVETIRACETAM 1000 MG: 100 INJECTION INTRAVENOUS at 14:25

## 2025-01-14 RX ADMIN — HYDROCORTISONE SODIUM SUCCINATE 100 MG: 100 INJECTION, POWDER, FOR SOLUTION INTRAMUSCULAR; INTRAVENOUS at 10:22

## 2025-01-14 RX ADMIN — IPRATROPIUM BROMIDE AND ALBUTEROL SULFATE 1 DOSE: 2.5; .5 SOLUTION RESPIRATORY (INHALATION) at 16:58

## 2025-01-14 RX ADMIN — SODIUM CHLORIDE, PRESERVATIVE FREE 20 MG: 5 INJECTION INTRAVENOUS at 20:14

## 2025-01-14 RX ADMIN — HYDROCORTISONE SODIUM SUCCINATE 100 MG: 100 INJECTION, POWDER, FOR SOLUTION INTRAMUSCULAR; INTRAVENOUS at 02:50

## 2025-01-14 RX ADMIN — ATROPINE SULFATE 1 MG: 0.1 INJECTION INTRAVENOUS at 02:50

## 2025-01-14 RX ADMIN — LEVETIRACETAM 1000 MG: 100 INJECTION INTRAVENOUS at 02:50

## 2025-01-14 RX ADMIN — ENOXAPARIN SODIUM 40 MG: 100 INJECTION SUBCUTANEOUS at 08:41

## 2025-01-14 RX ADMIN — Medication 100 MCG/HR: at 06:35

## 2025-01-14 RX ADMIN — PIPERACILLIN AND TAZOBACTAM 4500 MG: 4; .5 INJECTION, POWDER, LYOPHILIZED, FOR SOLUTION INTRAVENOUS at 10:20

## 2025-01-14 RX ADMIN — SODIUM CHLORIDE, PRESERVATIVE FREE 20 MG: 5 INJECTION INTRAVENOUS at 08:38

## 2025-01-14 RX ADMIN — Medication 15 ML: at 03:03

## 2025-01-14 ASSESSMENT — PULMONARY FUNCTION TESTS
PIF_VALUE: 33
PIF_VALUE: 23
PIF_VALUE: 36
PIF_VALUE: 36
PIF_VALUE: 33
PIF_VALUE: 26
PIF_VALUE: 28
PIF_VALUE: 36
PIF_VALUE: 34
PIF_VALUE: 29
PIF_VALUE: 37
PIF_VALUE: 31
PIF_VALUE: 30
PIF_VALUE: 45
PIF_VALUE: 26
PIF_VALUE: 25
PIF_VALUE: 30
PIF_VALUE: 32
PIF_VALUE: 38
PIF_VALUE: 29
PIF_VALUE: 26
PIF_VALUE: 26
PIF_VALUE: 27
PIF_VALUE: 30
PIF_VALUE: 32
PIF_VALUE: 26
PIF_VALUE: 29
PIF_VALUE: 32
PIF_VALUE: 31
PIF_VALUE: 36
PIF_VALUE: 28
PIF_VALUE: 37
PIF_VALUE: 28
PIF_VALUE: 29
PIF_VALUE: 26
PIF_VALUE: 35
PIF_VALUE: 30

## 2025-01-14 NOTE — CARE COORDINATION
Case Management Assessment  Initial Evaluation    Date/Time of Evaluation: 1/14/2025 1:02 PM  Assessment Completed by: Stefania Lugo RN    If patient is discharged prior to next notation, then this note serves as note for discharge by case management.    Patient Name: Jimena Rodriguez                   YOB: 1950  Diagnosis: Cardiac arrest [I46.9]  Respiratory arrest (HCC) [R09.2]  Aspiration into airway, initial encounter [T17.908A]                   Date / Time: 1/13/2025  5:41 PM    Patient Admission Status: Inpatient   Readmission Risk (Low < 19, Mod (19-27), High > 27): Readmission Risk Score: 13.9    Current PCP: No primary care provider on file.  PCP verified by CM? Yes    Chart Reviewed: Yes      History Provided by: Medical Record, Spouse  Patient Orientation: Unable to Assess    Patient Cognition: Dementia / Early Alzheimer's (Vent)    Hospitalization in the last 30 days (Readmission):  No    If yes, Readmission Assessment in  Navigator will be completed.    Advance Directives:      Code Status: Full Code   Patient's Primary Decision Maker is: Legal Next of Kin    Primary Decision Maker (Active): Jorge Alberto Rodriguez J - Spouse - [phone]    Discharge Planning:    Patient lives with:   Type of Home:    Primary Care Giver: Private caregiver  Patient Support Systems include: Spouse/Significant Other   Current Financial resources:    Current community resources:    Current services prior to admission:              Current DME:              Type of Home Care services:       ADLS  Prior functional level: Mobility, Shopping, Housework, Cooking, Bathing, Dressing  Current functional level: Other (see comment) (intubated in icu)    PT AM-PAC:   /24  OT AM-PAC:   /24    Family can provide assistance at DC: No  Would you like Case Management to discuss the discharge plan with any other family members/significant others, and if so, who? Yes (next of kin)  Plans to Return to Present Housing: Unknown at

## 2025-01-14 NOTE — PROCEDURES
PROCEDURE NOTE  Date: 1/14/2025   Name: Jimena Rodriguez  YOB: 1950    Procedures  Procedure: Left radial arterial line placement.     Indications: Continuous monitoring of blood pressure in a patient with hypotension + shock, on Levophed.     Consent:  Telephone consent obtained from spouse.     Technique:  Procedure was done using strict aseptic technique. Right/Left radial site was cleaned with chloraprep and draped.  Radial artery was identified, then Lidocaine 1% was infiltrated locally.  Radial arterial line was inserted, a good blood flow was obtained, after which guidewire was inserted all the way with no resistance. Then the canula was inserted and needle with guidewire was withdrawn. Pulsatile bright red blood flow was observed. The canula was connected to BP monitoring apparatus and a good waveform was noted. Then the canula was secured with 1 stay sutures of 3-0 silk after Lidocaine infiltration, following which dressing was applied.     Number of sticks: 1.     Number of Kits used: 1.     Complications: No immediate complication.     Estimated blood loss: About 1 ml.     Comment: Patient tolerated the procedure well.     CHAY Landaverde - JULIAN     Proctored by Karin Lakhani.

## 2025-01-14 NOTE — PROCEDURES
PROCEDURE NOTE  Date: 2025   Name: Jimena Rodriguez  YOB: 1950    Procedures  [facility]    MRN: [id number]  PATIENT NAME: Jimena Rodriguez  DATE OF REPORT: 2025  DATE OF SERVICE: 2025  PHYSICIAN NAME: Luis Mae DO  Referring Physician: Karin Kennedy      Patient's : 1950  Patient's Age: 74 y.o.  Gender: female    PROCEDURE: Routine EEG with video      Clinical Interpretation:   ***           ____________________________  Electronically signed by: Luis Mae DO, 2025 9:36 AM      Patient Clinical Information   Reason for Study: ***  Patient State: ***  Primary neurological diagnosis: ***  Primary indication for monitoring: ***    Pertinent Medications and Treatments    Current Facility-Administered Medications:     sodium chloride flush 0.9 % injection 5-40 mL, 5-40 mL, IntraVENous, 2 times per day, Naomie Penaloza APRN - CNP, 10 mL at 25 0841    sodium chloride flush 0.9 % injection 5-40 mL, 5-40 mL, IntraVENous, PRN, Naomie Penaloza APRN - CNP    0.9 % sodium chloride infusion, , IntraVENous, PRN, Naomie Penaloza APRN - CNP    enoxaparin (LOVENOX) injection 40 mg, 40 mg, SubCUTAneous, Daily, Naomie Penaloza APRN - CNP, 40 mg at 25 0841    polyethylene glycol (GLYCOLAX) packet 17 g, 17 g, Oral, Daily PRN, Naomie Penaloza APRN - CNP    acetaminophen (TYLENOL) tablet 650 mg, 650 mg, Oral, Q6H PRN **OR** acetaminophen (TYLENOL) suppository 650 mg, 650 mg, Rectal, Q6H PRN, Naomie Penaloza APRN - JULIAN    magnesium sulfate 2000 mg in 50 mL IVPB premix, 2,000 mg, IntraVENous, PRN, Naomie Penaloza APRN - CNP    potassium chloride 20 mEq/50 mL IVPB (Central Line), 20 mEq, IntraVENous, PRN **OR** potassium chloride 10 mEq/100 mL IVPB (Peripheral Line), 10 mEq, IntraVENous, PRN, Naomie Penaloza, APRN - CNP    chlorhexidine (PERIDEX) 0.12 % solution 15 mL, 15 mL, Mouth/Throat,

## 2025-01-14 NOTE — CARE COORDINATION
Care Coordination:  LOS 1 day.  From [facility] at [place], Admitted MICU s/p cardiac arrest, bradycardia, shock. Vent, Levophed, Fentanyl.  Met with  Jorge Alberto at bedside. Confirmed from LTC at [facility]. States he visits every day, confined to , able to eat dinner in dining area. States he noticed she was pocketing food and having trouble swallowing.  On the day of admission, he states she was pocketing food in side cheek, swallowed and \"Regurgitated it up and was choking, then passed out\". He states she does not have a formal diagnosis of dementia.  Goal is to return to [facility] pending course of hospitalization Left message for Gege at [facility] to confirm bed status, will follow for transition of care needs    Electronically signed by Stefania Lugo RN on 1/14/2025 at 12:59 PM     The Plan for Transition of Care is related to the following treatment goals: dc    The Patient and/or patient representative  was provided with a choice of provider and agrees   with the discharge plan. [x] Yes [] No    Freedom of choice list was provided with basic dialogue that supports the patient's individualized plan of care/goals, treatment preferences and shares the quality data associated with the providers. [x] Yes [] No

## 2025-01-14 NOTE — H&P
Hospital Medicine History & Physical      PCP: No primary care provider on file.    Date of Admission: 1/13/2025    Date of Service: Pt seen/examined on 1/13/2025 and Admitted to Inpatient with expected LOS greater than two midnights due to medical therapy.     Chief Complaint: Choking episode      History Of Present Illness:      74 y.o. female who presented to [facility] with altered mental status left-sided weakness seizure-like activity and choking episode.  She was intubated by EMS prior to arrival.  Blood gas of pH of 7.27 pCO2 57 and pO2 of 237.2.  Chest x-ray concerning for interstitial opacities concerning for aspiration.  She was given Zosyn. Also of note on presentation she was hypotensive blood pressure of 74/45 tachypneic.  In the 40s temperature 93.7.  She was started on Levophed.    Past Medical History:          Diagnosis Date    Hypertension        Past Surgical History:          Procedure Laterality Date    CHOLECYSTECTOMY      HYSTERECTOMY (CERVIX STATUS UNKNOWN)      SKIN BIOPSY         Medications Prior to Admission:      Prior to Admission medications    Medication Sig Start Date End Date Taking? Authorizing Provider   triamterene-hydroCHLOROthiazide (DYAZIDE) 37.5-25 MG per capsule Take 1 capsule by mouth every morning    ProviderGaudencio MD   magnesium hydroxide (MILK OF MAGNESIA) 400 MG/5ML suspension Take 30 mLs by mouth daily as needed for Constipation    ProviderGaudencio MD   memantine (NAMENDA) 10 MG tablet Take 1 tablet by mouth 2 times daily    Gaudencio Betancourt MD   acetaminophen (TYLENOL) 325 MG tablet Take 2 tablets by mouth every 6 hours as needed for Pain    ProviderGaudencio MD   divalproex (DEPAKOTE SPRINKLES) 125 MG DR capsule Take 1 capsule by mouth 2 times daily 12/14/23   Kari Reyna DO   miconazole (MICOTIN) 2 % powder Apply topically 2 times daily. 12/14/23   Kari Reyna DO   donepezil (ARICEPT) 10 MG tablet Take 1 tablet

## 2025-01-14 NOTE — ACP (ADVANCE CARE PLANNING)
Advance Care Planning   Healthcare Decision Maker:    Primary Decision Maker (Active): Jorge Alberto Rodriguez J - Spouse - [phone]    Click here to complete Healthcare Decision Makers including selection of the Healthcare Decision Maker Relationship (ie \"Primary\").

## 2025-01-15 LAB
AADO2: 226.5 MMHG
ALBUMIN SERPL-MCNC: 2.6 G/DL (ref 3.5–5.2)
ALP SERPL-CCNC: 128 U/L (ref 35–104)
ALT SERPL-CCNC: 20 U/L (ref 0–32)
ANION GAP SERPL CALCULATED.3IONS-SCNC: 12 MMOL/L (ref 7–16)
APPEARANCE BRONCH: NORMAL
AST SERPL-CCNC: 42 U/L (ref 0–31)
B.E.: 4.8 MMOL/L (ref -3–3)
BASOPHILS # BLD: 0.02 K/UL (ref 0–0.2)
BASOPHILS NFR BLD: 0 % (ref 0–2)
BILIRUB SERPL-MCNC: 0.6 MG/DL (ref 0–1.2)
BUN SERPL-MCNC: 23 MG/DL (ref 6–23)
CALCIUM SERPL-MCNC: 8.4 MG/DL (ref 8.6–10.2)
CHLORIDE SERPL-SCNC: 104 MMOL/L (ref 98–107)
CLOT CHECK: NORMAL
CO2 SERPL-SCNC: 25 MMOL/L (ref 22–29)
COHB: 0.3 % (ref 0–1.5)
COLOR BRONCH: NORMAL
CREAT SERPL-MCNC: 0.9 MG/DL (ref 0.5–1)
CRITICAL: ABNORMAL
DATE ANALYZED: ABNORMAL
DATE LAST DOSE: NORMAL
DATE OF COLLECTION: ABNORMAL
ECHO AO ASC DIAM: 2.6 CM
ECHO AO ASCENDING AORTA INDEX: 1.41 CM/M2
ECHO AV AREA PEAK VELOCITY: 2.4 CM2
ECHO AV AREA VTI: 2.4 CM2
ECHO AV AREA/BSA PEAK VELOCITY: 1.3 CM2/M2
ECHO AV AREA/BSA VTI: 1.3 CM2/M2
ECHO AV CUSP MM: 2.2 CM
ECHO AV MEAN GRADIENT: 30 MMHG
ECHO AV MEAN VELOCITY: 2.5 M/S
ECHO AV PEAK GRADIENT: 62 MMHG
ECHO AV PEAK VELOCITY: 3.9 M/S
ECHO AV VELOCITY RATIO: 0.64
ECHO AV VTI: 74 CM
ECHO BSA: 1.88 M2
ECHO LA DIAMETER INDEX: 1.68 CM/M2
ECHO LA DIAMETER: 3.1 CM
ECHO LA VOL A-L A2C: 29 ML (ref 22–52)
ECHO LA VOL A-L A4C: 21 ML (ref 22–52)
ECHO LA VOL MOD A2C: 27 ML (ref 22–52)
ECHO LA VOL MOD A4C: 20 ML (ref 22–52)
ECHO LA VOLUME AREA LENGTH: 25 ML
ECHO LA VOLUME INDEX A-L A2C: 16 ML/M2 (ref 16–34)
ECHO LA VOLUME INDEX A-L A4C: 11 ML/M2 (ref 16–34)
ECHO LA VOLUME INDEX AREA LENGTH: 14 ML/M2 (ref 16–34)
ECHO LA VOLUME INDEX MOD A2C: 15 ML/M2 (ref 16–34)
ECHO LA VOLUME INDEX MOD A4C: 11 ML/M2 (ref 16–34)
ECHO LV EDV A2C: 62 ML
ECHO LV EDV A4C: 81 ML
ECHO LV EDV BP: 71 ML (ref 56–104)
ECHO LV EDV INDEX A4C: 44 ML/M2
ECHO LV EDV INDEX BP: 39 ML/M2
ECHO LV EDV NDEX A2C: 34 ML/M2
ECHO LV EJECTION FRACTION A2C: 70 %
ECHO LV EJECTION FRACTION A4C: 62 %
ECHO LV EJECTION FRACTION BIPLANE: 63 % (ref 55–100)
ECHO LV ESV A2C: 19 ML
ECHO LV ESV A4C: 31 ML
ECHO LV ESV BP: 26 ML (ref 19–49)
ECHO LV ESV INDEX A2C: 10 ML/M2
ECHO LV ESV INDEX A4C: 17 ML/M2
ECHO LV ESV INDEX BP: 14 ML/M2
ECHO LV FRACTIONAL SHORTENING: 34 % (ref 28–44)
ECHO LV INTERNAL DIMENSION DIASTOLE INDEX: 2.07 CM/M2
ECHO LV INTERNAL DIMENSION DIASTOLIC: 3.8 CM (ref 3.9–5.3)
ECHO LV INTERNAL DIMENSION SYSTOLIC INDEX: 1.36 CM/M2
ECHO LV INTERNAL DIMENSION SYSTOLIC: 2.5 CM
ECHO LV ISOVOLUMETRIC RELAXATION TIME (IVRT): 83 MS
ECHO LV IVSD: 1.3 CM (ref 0.6–0.9)
ECHO LV MASS 2D: 173.1 G (ref 67–162)
ECHO LV MASS INDEX 2D: 94 G/M2 (ref 43–95)
ECHO LV POSTERIOR WALL DIASTOLIC: 1.3 CM (ref 0.6–0.9)
ECHO LV RELATIVE WALL THICKNESS RATIO: 0.68
ECHO LVOT AREA: 3.8 CM2
ECHO LVOT AV VTI INDEX: 0.65
ECHO LVOT DIAM: 2.2 CM
ECHO LVOT MEAN GRADIENT: 12 MMHG
ECHO LVOT PEAK GRADIENT: 25 MMHG
ECHO LVOT PEAK VELOCITY: 2.5 M/S
ECHO LVOT STROKE VOLUME INDEX: 99.5 ML/M2
ECHO LVOT SV: 183.1 ML
ECHO LVOT VTI: 48.2 CM
ECHO MV A VELOCITY: 0.61 M/S
ECHO MV AREA PHT: 7.3 CM2
ECHO MV AREA VTI: 9.3 CM2
ECHO MV E DECELERATION TIME (DT): 109.6 MS
ECHO MV E VELOCITY: 1.28 M/S
ECHO MV E/A RATIO: 2.1
ECHO MV LVOT VTI INDEX: 0.41
ECHO MV MAX VELOCITY: 1.1 M/S
ECHO MV MEAN GRADIENT: 3 MMHG
ECHO MV MEAN VELOCITY: 0.8 M/S
ECHO MV PEAK GRADIENT: 5 MMHG
ECHO MV PRESSURE HALF TIME (PHT): 30 MS
ECHO MV VTI: 19.7 CM
ECHO PV MAX VELOCITY: 1.2 M/S
ECHO PV MEAN GRADIENT: 2 MMHG
ECHO PV MEAN VELOCITY: 0.7 M/S
ECHO PV PEAK GRADIENT: 5 MMHG
ECHO PV VTI: 20.9 CM
ECHO RV INTERNAL DIMENSION: 1.9 CM
ECHO TV REGURGITANT MAX VELOCITY: 2.87 M/S
ECHO TV REGURGITANT PEAK GRADIENT: 33 MMHG
EOSINOPHIL # BLD: 0 K/UL (ref 0.05–0.5)
EOSINOPHILS RELATIVE PERCENT: 0 % (ref 0–6)
ERYTHROCYTE [DISTWIDTH] IN BLOOD BY AUTOMATED COUNT: 15.3 % (ref 11.5–15)
FIO2: 65 %
GFR, ESTIMATED: 71 ML/MIN/1.73M2
GLUCOSE SERPL-MCNC: 148 MG/DL (ref 74–99)
HCO3: 26.4 MMOL/L (ref 22–26)
HCT VFR BLD AUTO: 33.5 % (ref 34–48)
HGB BLD-MCNC: 11.5 G/DL (ref 11.5–15.5)
HHB: 0.9 % (ref 0–5)
IMM GRANULOCYTES # BLD AUTO: 0.11 K/UL (ref 0–0.58)
IMM GRANULOCYTES NFR BLD: 1 % (ref 0–5)
LAB: ABNORMAL
LACTATE BLDV-SCNC: 1.1 MMOL/L (ref 0.5–2.2)
LACTATE BLDV-SCNC: 1.3 MMOL/L (ref 0.5–2.2)
LACTATE BLDV-SCNC: 1.3 MMOL/L (ref 0.5–2.2)
LACTATE BLDV-SCNC: 1.5 MMOL/L (ref 0.5–2.2)
LYMPHOCYTES NFR BLD: 1.32 K/UL (ref 1.5–4)
LYMPHOCYTES RELATIVE PERCENT: 8 % (ref 20–42)
Lab: 416
MAGNESIUM SERPL-MCNC: 2.3 MG/DL (ref 1.6–2.6)
MCH RBC QN AUTO: 31.3 PG (ref 26–35)
MCHC RBC AUTO-ENTMCNC: 34.3 G/DL (ref 32–34.5)
MCV RBC AUTO: 91 FL (ref 80–99.9)
METHB: 0.4 % (ref 0–1.5)
MICROORGANISM SPEC CULT: NORMAL
MICROORGANISM/AGENT SPEC: NORMAL
MODE: AC
MONOCYTES NFR BLD: 0.96 K/UL (ref 0.1–0.95)
MONOCYTES NFR BLD: 6 % (ref 2–12)
NEUTROPHILS NFR BLD: 85 % (ref 43–80)
NEUTS SEG NFR BLD: 13.51 K/UL (ref 1.8–7.3)
O2 SATURATION: 99.1 % (ref 92–98.5)
O2HB: 98.4 % (ref 94–97)
OPERATOR ID: 1110
PATIENT TEMP: 37 C
PCO2: 29.3 MMHG (ref 35–45)
PEEP/CPAP: 8 CMH2O
PFO2: 3.16 MMHG/%
PH BLOOD GAS: 7.57 (ref 7.35–7.45)
PHOSPHATE SERPL-MCNC: 3.1 MG/DL (ref 2.5–4.5)
PLATELET # BLD AUTO: 151 K/UL (ref 130–450)
PMV BLD AUTO: 10.9 FL (ref 7–12)
PO2: 205.1 MMHG (ref 75–100)
POTASSIUM SERPL-SCNC: 3.4 MMOL/L (ref 3.5–5)
PROT SERPL-MCNC: 6 G/DL (ref 6.4–8.3)
RBC # BLD AUTO: 3.68 M/UL (ref 3.5–5.5)
RBC, BAL: 1938 CELLS/UL
RI(T): 1.1
RR MECHANICAL: 14 B/MIN
SODIUM SERPL-SCNC: 141 MMOL/L (ref 132–146)
SOURCE, BLOOD GAS: ABNORMAL
SPECIMEN DESCRIPTION: NORMAL
SPECIMEN TYPE: NORMAL
T4 FREE SERPL-MCNC: 0.9 NG/DL (ref 0.9–1.7)
THB: 11.7 G/DL (ref 11.5–16.5)
TIME ANALYZED: 424
TME LAST DOSE: NORMAL H
TOTAL CELLS COUNTED BRONCH: 37 CELLS/UL
VANCOMYCIN DOSE: NORMAL MG
VANCOMYCIN SERPL-MCNC: 8.4 UG/ML (ref 5–40)
VT MECHANICAL: 450 ML
WBC OTHER # BLD: 15.9 K/UL (ref 4.5–11.5)

## 2025-01-15 PROCEDURE — 84439 ASSAY OF FREE THYROXINE: CPT

## 2025-01-15 PROCEDURE — 99291 CRITICAL CARE FIRST HOUR: CPT | Performed by: INTERNAL MEDICINE

## 2025-01-15 PROCEDURE — 82805 BLOOD GASES W/O2 SATURATION: CPT

## 2025-01-15 PROCEDURE — 6360000002 HC RX W HCPCS

## 2025-01-15 PROCEDURE — 2500000003 HC RX 250 WO HCPCS: Performed by: EMERGENCY MEDICINE

## 2025-01-15 PROCEDURE — 99233 SBSQ HOSP IP/OBS HIGH 50: CPT | Performed by: INTERNAL MEDICINE

## 2025-01-15 PROCEDURE — 2000000000 HC ICU R&B

## 2025-01-15 PROCEDURE — 84100 ASSAY OF PHOSPHORUS: CPT

## 2025-01-15 PROCEDURE — 6370000000 HC RX 637 (ALT 250 FOR IP)

## 2025-01-15 PROCEDURE — 37799 UNLISTED PX VASCULAR SURGERY: CPT

## 2025-01-15 PROCEDURE — 83605 ASSAY OF LACTIC ACID: CPT

## 2025-01-15 PROCEDURE — 2580000003 HC RX 258

## 2025-01-15 PROCEDURE — 2500000003 HC RX 250 WO HCPCS

## 2025-01-15 PROCEDURE — 85025 COMPLETE CBC W/AUTO DIFF WBC: CPT

## 2025-01-15 PROCEDURE — 6360000002 HC RX W HCPCS: Performed by: INTERNAL MEDICINE

## 2025-01-15 PROCEDURE — 94003 VENT MGMT INPAT SUBQ DAY: CPT

## 2025-01-15 PROCEDURE — 2580000003 HC RX 258: Performed by: INTERNAL MEDICINE

## 2025-01-15 PROCEDURE — 99231 SBSQ HOSP IP/OBS SF/LOW 25: CPT | Performed by: PHYSICIAN ASSISTANT

## 2025-01-15 PROCEDURE — 80202 ASSAY OF VANCOMYCIN: CPT

## 2025-01-15 PROCEDURE — 93306 TTE W/DOPPLER COMPLETE: CPT | Performed by: INTERNAL MEDICINE

## 2025-01-15 PROCEDURE — 80053 COMPREHEN METABOLIC PANEL: CPT

## 2025-01-15 PROCEDURE — 83735 ASSAY OF MAGNESIUM: CPT

## 2025-01-15 PROCEDURE — 94640 AIRWAY INHALATION TREATMENT: CPT

## 2025-01-15 RX ADMIN — LEVOTHYROXINE SODIUM 25 MCG: 0.03 TABLET ORAL at 06:35

## 2025-01-15 RX ADMIN — POTASSIUM BICARBONATE 40 MEQ: 782 TABLET, EFFERVESCENT ORAL at 06:35

## 2025-01-15 RX ADMIN — ENOXAPARIN SODIUM 40 MG: 100 INJECTION SUBCUTANEOUS at 07:49

## 2025-01-15 RX ADMIN — Medication 15 ML: at 21:00

## 2025-01-15 RX ADMIN — HYDROCORTISONE SODIUM SUCCINATE 100 MG: 100 INJECTION, POWDER, FOR SOLUTION INTRAMUSCULAR; INTRAVENOUS at 03:20

## 2025-01-15 RX ADMIN — SODIUM CHLORIDE, PRESERVATIVE FREE 10 ML: 5 INJECTION INTRAVENOUS at 21:01

## 2025-01-15 RX ADMIN — VANCOMYCIN HYDROCHLORIDE 1250 MG: 1.25 INJECTION, POWDER, LYOPHILIZED, FOR SOLUTION INTRAVENOUS at 06:35

## 2025-01-15 RX ADMIN — Medication 15 ML: at 07:49

## 2025-01-15 RX ADMIN — SODIUM CHLORIDE, PRESERVATIVE FREE 20 MG: 5 INJECTION INTRAVENOUS at 07:49

## 2025-01-15 RX ADMIN — Medication 6 MCG/MIN: at 23:17

## 2025-01-15 RX ADMIN — SODIUM CHLORIDE, PRESERVATIVE FREE 20 MG: 5 INJECTION INTRAVENOUS at 21:00

## 2025-01-15 RX ADMIN — IPRATROPIUM BROMIDE AND ALBUTEROL SULFATE 1 DOSE: 2.5; .5 SOLUTION RESPIRATORY (INHALATION) at 08:26

## 2025-01-15 RX ADMIN — PIPERACILLIN AND TAZOBACTAM 4500 MG: 4; .5 INJECTION, POWDER, LYOPHILIZED, FOR SOLUTION INTRAVENOUS at 10:48

## 2025-01-15 RX ADMIN — IPRATROPIUM BROMIDE AND ALBUTEROL SULFATE 1 DOSE: 2.5; .5 SOLUTION RESPIRATORY (INHALATION) at 21:10

## 2025-01-15 RX ADMIN — SODIUM CHLORIDE, PRESERVATIVE FREE 10 ML: 5 INJECTION INTRAVENOUS at 07:49

## 2025-01-15 RX ADMIN — PIPERACILLIN AND TAZOBACTAM 4500 MG: 4; .5 INJECTION, POWDER, LYOPHILIZED, FOR SOLUTION INTRAVENOUS at 18:01

## 2025-01-15 RX ADMIN — PIPERACILLIN AND TAZOBACTAM 4500 MG: 4; .5 INJECTION, POWDER, LYOPHILIZED, FOR SOLUTION INTRAVENOUS at 02:08

## 2025-01-15 RX ADMIN — ACETAMINOPHEN 650 MG: 325 TABLET ORAL at 21:00

## 2025-01-15 RX ADMIN — HYDROCORTISONE SODIUM SUCCINATE 100 MG: 100 INJECTION, POWDER, FOR SOLUTION INTRAMUSCULAR; INTRAVENOUS at 17:57

## 2025-01-15 RX ADMIN — IPRATROPIUM BROMIDE AND ALBUTEROL SULFATE 1 DOSE: 2.5; .5 SOLUTION RESPIRATORY (INHALATION) at 15:29

## 2025-01-15 RX ADMIN — HYDROCORTISONE SODIUM SUCCINATE 100 MG: 100 INJECTION, POWDER, FOR SOLUTION INTRAMUSCULAR; INTRAVENOUS at 10:55

## 2025-01-15 RX ADMIN — IPRATROPIUM BROMIDE AND ALBUTEROL SULFATE 1 DOSE: 2.5; .5 SOLUTION RESPIRATORY (INHALATION) at 11:54

## 2025-01-15 ASSESSMENT — PULMONARY FUNCTION TESTS
PIF_VALUE: 25
PIF_VALUE: 29
PIF_VALUE: 33
PIF_VALUE: 27
PIF_VALUE: 34
PIF_VALUE: 24
PIF_VALUE: 21
PIF_VALUE: 27
PIF_VALUE: 25
PIF_VALUE: 29
PIF_VALUE: 24
PIF_VALUE: 28
PIF_VALUE: 24
PIF_VALUE: 26
PIF_VALUE: 28
PIF_VALUE: 26
PIF_VALUE: 25
PIF_VALUE: 26
PIF_VALUE: 30
PIF_VALUE: 24
PIF_VALUE: 26
PIF_VALUE: 27
PIF_VALUE: 27
PIF_VALUE: 26
PIF_VALUE: 24
PIF_VALUE: 26
PIF_VALUE: 26
PIF_VALUE: 24
PIF_VALUE: 23
PIF_VALUE: 27

## 2025-01-15 ASSESSMENT — PAIN SCALES - GENERAL: PAINLEVEL_OUTOF10: 3

## 2025-01-15 NOTE — PROCEDURES
PROCEDURE NOTE  Date: 1/14/2025   Name: Jimena Rodriguez  YOB: 1950    Procedures:    Method:   This recording was done using a digital EEG machine with 16 channels of EEG recording 1 channel of EKG recording .  It is done at the patient's bedside in the intensive care unit.  It captures periods Of unresponsiveness        Interpretation:   There is a posterior dominant rhythm of 5-6 Hz theta activity which is interspersed with 2 to 3 Hz delta activity bilaterally. Beta activity is not seen in this recording. There were no epileptiform abnormalities or electrographic seizures in this recording.     EKG demonstrates a regular rhythm with rate of 78 bpm     Summary: This is an abnormal Electroencephalogram with moderate generalized background slowing suggestive of moderate metabolic or toxic encephalopathy, dementia, delirium, or medication effect. There are no epileptiform abnormality or electrographic seizures in this recording .         Mackenzie Gutierrez MD

## 2025-01-16 LAB
AADO2: 96.7 MMHG
ALBUMIN SERPL-MCNC: 2.8 G/DL (ref 3.5–5.2)
ALP SERPL-CCNC: 121 U/L (ref 35–104)
ALT SERPL-CCNC: 19 U/L (ref 0–32)
ANION GAP SERPL CALCULATED.3IONS-SCNC: 8 MMOL/L (ref 7–16)
ANION GAP SERPL CALCULATED.3IONS-SCNC: 9 MMOL/L (ref 7–16)
AST SERPL-CCNC: 36 U/L (ref 0–31)
B.E.: 5 MMOL/L (ref -3–3)
BASOPHILS # BLD: 0.02 K/UL (ref 0–0.2)
BASOPHILS NFR BLD: 0 % (ref 0–2)
BILIRUB SERPL-MCNC: 0.7 MG/DL (ref 0–1.2)
BUN SERPL-MCNC: 23 MG/DL (ref 6–23)
BUN SERPL-MCNC: 24 MG/DL (ref 6–23)
CALCIUM SERPL-MCNC: 7.9 MG/DL (ref 8.6–10.2)
CALCIUM SERPL-MCNC: 8.4 MG/DL (ref 8.6–10.2)
CHLORIDE SERPL-SCNC: 107 MMOL/L (ref 98–107)
CHLORIDE SERPL-SCNC: 111 MMOL/L (ref 98–107)
CO2 SERPL-SCNC: 26 MMOL/L (ref 22–29)
CO2 SERPL-SCNC: 28 MMOL/L (ref 22–29)
COHB: 0.5 % (ref 0–1.5)
CREAT SERPL-MCNC: 0.8 MG/DL (ref 0.5–1)
CREAT SERPL-MCNC: 0.8 MG/DL (ref 0.5–1)
CRITICAL: ABNORMAL
DATE ANALYZED: ABNORMAL
DATE OF COLLECTION: ABNORMAL
EKG ATRIAL RATE: 69 BPM
EKG P AXIS: 15 DEGREES
EKG P-R INTERVAL: 182 MS
EKG Q-T INTERVAL: 468 MS
EKG QRS DURATION: 100 MS
EKG QTC CALCULATION (BAZETT): 501 MS
EKG R AXIS: -6 DEGREES
EKG T AXIS: 99 DEGREES
EKG VENTRICULAR RATE: 69 BPM
EOSINOPHIL # BLD: 0 K/UL (ref 0.05–0.5)
EOSINOPHILS RELATIVE PERCENT: 0 % (ref 0–6)
ERYTHROCYTE [DISTWIDTH] IN BLOOD BY AUTOMATED COUNT: 15.1 % (ref 11.5–15)
FIO2: 40 %
GFR, ESTIMATED: 73 ML/MIN/1.73M2
GFR, ESTIMATED: 74 ML/MIN/1.73M2
GLUCOSE SERPL-MCNC: 116 MG/DL (ref 74–99)
GLUCOSE SERPL-MCNC: 185 MG/DL (ref 74–99)
HCO3: 27.1 MMOL/L (ref 22–26)
HCT VFR BLD AUTO: 31.4 % (ref 34–48)
HGB BLD-MCNC: 10.4 G/DL (ref 11.5–15.5)
HHB: 0.9 % (ref 0–5)
IMM GRANULOCYTES # BLD AUTO: 0.15 K/UL (ref 0–0.58)
IMM GRANULOCYTES NFR BLD: 1 % (ref 0–5)
LAB: ABNORMAL
LYMPHOCYTES NFR BLD: 1.38 K/UL (ref 1.5–4)
LYMPHOCYTES RELATIVE PERCENT: 9 % (ref 20–42)
Lab: 435
MAGNESIUM SERPL-MCNC: 2.4 MG/DL (ref 1.6–2.6)
MCH RBC QN AUTO: 30.6 PG (ref 26–35)
MCHC RBC AUTO-ENTMCNC: 33.1 G/DL (ref 32–34.5)
MCV RBC AUTO: 92.4 FL (ref 80–99.9)
METHB: 0.4 % (ref 0–1.5)
MODE: AC
MONOCYTES NFR BLD: 1.15 K/UL (ref 0.1–0.95)
MONOCYTES NFR BLD: 7 % (ref 2–12)
NEUTROPHILS NFR BLD: 83 % (ref 43–80)
NEUTS SEG NFR BLD: 12.81 K/UL (ref 1.8–7.3)
O2 SATURATION: 99.1 % (ref 92–98.5)
O2HB: 98.2 % (ref 94–97)
OPERATOR ID: 2962
PATIENT TEMP: 37 C
PCO2: 31.6 MMHG (ref 35–45)
PEEP/CPAP: 8 CMH2O
PFO2: 3.8 MMHG/%
PH BLOOD GAS: 7.55 (ref 7.35–7.45)
PHOSPHATE SERPL-MCNC: 2.2 MG/DL (ref 2.5–4.5)
PLATELET # BLD AUTO: 139 K/UL (ref 130–450)
PMV BLD AUTO: 10.4 FL (ref 7–12)
PO2: 152.2 MMHG (ref 75–100)
POTASSIUM SERPL-SCNC: 2.9 MMOL/L (ref 3.5–5)
POTASSIUM SERPL-SCNC: 3.7 MMOL/L (ref 3.5–5)
PROT SERPL-MCNC: 5.9 G/DL (ref 6.4–8.3)
RBC # BLD AUTO: 3.4 M/UL (ref 3.5–5.5)
RI(T): 0.64
RR MECHANICAL: 12 B/MIN
SODIUM SERPL-SCNC: 144 MMOL/L (ref 132–146)
SODIUM SERPL-SCNC: 145 MMOL/L (ref 132–146)
SOURCE, BLOOD GAS: ABNORMAL
THB: 11.6 G/DL (ref 11.5–16.5)
TIME ANALYZED: 445
VT MECHANICAL: 450 ML
WBC OTHER # BLD: 15.5 K/UL (ref 4.5–11.5)

## 2025-01-16 PROCEDURE — 6360000002 HC RX W HCPCS

## 2025-01-16 PROCEDURE — 99233 SBSQ HOSP IP/OBS HIGH 50: CPT | Performed by: INTERNAL MEDICINE

## 2025-01-16 PROCEDURE — 2500000003 HC RX 250 WO HCPCS

## 2025-01-16 PROCEDURE — 6370000000 HC RX 637 (ALT 250 FOR IP)

## 2025-01-16 PROCEDURE — 6360000002 HC RX W HCPCS: Performed by: INTERNAL MEDICINE

## 2025-01-16 PROCEDURE — 2580000003 HC RX 258: Performed by: INTERNAL MEDICINE

## 2025-01-16 PROCEDURE — 2580000003 HC RX 258

## 2025-01-16 PROCEDURE — 94003 VENT MGMT INPAT SUBQ DAY: CPT

## 2025-01-16 PROCEDURE — 83735 ASSAY OF MAGNESIUM: CPT

## 2025-01-16 PROCEDURE — 80053 COMPREHEN METABOLIC PANEL: CPT

## 2025-01-16 PROCEDURE — 99222 1ST HOSP IP/OBS MODERATE 55: CPT | Performed by: PSYCHIATRY & NEUROLOGY

## 2025-01-16 PROCEDURE — 82805 BLOOD GASES W/O2 SATURATION: CPT

## 2025-01-16 PROCEDURE — 93010 ELECTROCARDIOGRAM REPORT: CPT | Performed by: INTERNAL MEDICINE

## 2025-01-16 PROCEDURE — 99291 CRITICAL CARE FIRST HOUR: CPT | Performed by: INTERNAL MEDICINE

## 2025-01-16 PROCEDURE — 94640 AIRWAY INHALATION TREATMENT: CPT

## 2025-01-16 PROCEDURE — 99232 SBSQ HOSP IP/OBS MODERATE 35: CPT | Performed by: STUDENT IN AN ORGANIZED HEALTH CARE EDUCATION/TRAINING PROGRAM

## 2025-01-16 PROCEDURE — 36592 COLLECT BLOOD FROM PICC: CPT

## 2025-01-16 PROCEDURE — 80048 BASIC METABOLIC PNL TOTAL CA: CPT

## 2025-01-16 PROCEDURE — 85025 COMPLETE CBC W/AUTO DIFF WBC: CPT

## 2025-01-16 PROCEDURE — 2000000000 HC ICU R&B

## 2025-01-16 PROCEDURE — 84100 ASSAY OF PHOSPHORUS: CPT

## 2025-01-16 RX ORDER — HYDROCORTISONE SODIUM SUCCINATE 100 MG/2ML
50 INJECTION INTRAMUSCULAR; INTRAVENOUS EVERY 8 HOURS
Status: DISCONTINUED | OUTPATIENT
Start: 2025-01-16 | End: 2025-01-17

## 2025-01-16 RX ORDER — FENTANYL CITRATE 50 UG/ML
25 INJECTION, SOLUTION INTRAMUSCULAR; INTRAVENOUS
Status: DISCONTINUED | OUTPATIENT
Start: 2025-01-16 | End: 2025-01-17

## 2025-01-16 RX ORDER — CAFFEINE 200 MG
200 TABLET ORAL ONCE
Status: COMPLETED | OUTPATIENT
Start: 2025-01-16 | End: 2025-01-16

## 2025-01-16 RX ADMIN — POTASSIUM CHLORIDE 20 MEQ: 29.8 INJECTION, SOLUTION INTRAVENOUS at 07:02

## 2025-01-16 RX ADMIN — HYDROCORTISONE SODIUM SUCCINATE 50 MG: 100 INJECTION, POWDER, FOR SOLUTION INTRAMUSCULAR; INTRAVENOUS at 18:25

## 2025-01-16 RX ADMIN — PIPERACILLIN AND TAZOBACTAM 4500 MG: 4; .5 INJECTION, POWDER, LYOPHILIZED, FOR SOLUTION INTRAVENOUS at 17:06

## 2025-01-16 RX ADMIN — ACETAMINOPHEN 650 MG: 325 TABLET ORAL at 22:01

## 2025-01-16 RX ADMIN — PIPERACILLIN AND TAZOBACTAM 4500 MG: 4; .5 INJECTION, POWDER, LYOPHILIZED, FOR SOLUTION INTRAVENOUS at 02:31

## 2025-01-16 RX ADMIN — SODIUM CHLORIDE, PRESERVATIVE FREE 10 ML: 5 INJECTION INTRAVENOUS at 21:33

## 2025-01-16 RX ADMIN — IPRATROPIUM BROMIDE AND ALBUTEROL SULFATE 1 DOSE: 2.5; .5 SOLUTION RESPIRATORY (INHALATION) at 20:35

## 2025-01-16 RX ADMIN — Medication 15 ML: at 21:34

## 2025-01-16 RX ADMIN — VANCOMYCIN HYDROCHLORIDE 1250 MG: 1.25 INJECTION, POWDER, LYOPHILIZED, FOR SOLUTION INTRAVENOUS at 06:20

## 2025-01-16 RX ADMIN — HYDROCORTISONE SODIUM SUCCINATE 100 MG: 100 INJECTION, POWDER, FOR SOLUTION INTRAMUSCULAR; INTRAVENOUS at 10:08

## 2025-01-16 RX ADMIN — FENTANYL CITRATE 25 MCG: 50 INJECTION INTRAMUSCULAR; INTRAVENOUS at 21:59

## 2025-01-16 RX ADMIN — POTASSIUM PHOSPHATE, MONOBASIC AND POTASSIUM PHOSPHATE, DIBASIC 15 MMOL: 224; 236 INJECTION, SOLUTION, CONCENTRATE INTRAVENOUS at 09:07

## 2025-01-16 RX ADMIN — ENOXAPARIN SODIUM 40 MG: 100 INJECTION SUBCUTANEOUS at 07:53

## 2025-01-16 RX ADMIN — PIPERACILLIN AND TAZOBACTAM 4500 MG: 4; .5 INJECTION, POWDER, LYOPHILIZED, FOR SOLUTION INTRAVENOUS at 10:07

## 2025-01-16 RX ADMIN — CAFFEINE 200 MG: 200 TABLET ORAL at 12:49

## 2025-01-16 RX ADMIN — HYDROCORTISONE SODIUM SUCCINATE 100 MG: 100 INJECTION, POWDER, FOR SOLUTION INTRAMUSCULAR; INTRAVENOUS at 04:10

## 2025-01-16 RX ADMIN — SODIUM CHLORIDE, PRESERVATIVE FREE 20 MG: 5 INJECTION INTRAVENOUS at 07:52

## 2025-01-16 RX ADMIN — SODIUM CHLORIDE, PRESERVATIVE FREE 10 ML: 5 INJECTION INTRAVENOUS at 07:54

## 2025-01-16 RX ADMIN — SODIUM CHLORIDE, PRESERVATIVE FREE 20 MG: 5 INJECTION INTRAVENOUS at 21:34

## 2025-01-16 RX ADMIN — LEVOTHYROXINE SODIUM 25 MCG: 0.03 TABLET ORAL at 06:17

## 2025-01-16 RX ADMIN — Medication 15 ML: at 07:53

## 2025-01-16 RX ADMIN — IPRATROPIUM BROMIDE AND ALBUTEROL SULFATE 1 DOSE: 2.5; .5 SOLUTION RESPIRATORY (INHALATION) at 15:36

## 2025-01-16 RX ADMIN — IPRATROPIUM BROMIDE AND ALBUTEROL SULFATE 1 DOSE: 2.5; .5 SOLUTION RESPIRATORY (INHALATION) at 11:32

## 2025-01-16 RX ADMIN — POTASSIUM CHLORIDE 20 MEQ: 29.8 INJECTION, SOLUTION INTRAVENOUS at 06:16

## 2025-01-16 RX ADMIN — POTASSIUM CHLORIDE 20 MEQ: 29.8 INJECTION, SOLUTION INTRAVENOUS at 07:59

## 2025-01-16 RX ADMIN — IPRATROPIUM BROMIDE AND ALBUTEROL SULFATE 1 DOSE: 2.5; .5 SOLUTION RESPIRATORY (INHALATION) at 08:15

## 2025-01-16 ASSESSMENT — PULMONARY FUNCTION TESTS
PIF_VALUE: 23
PIF_VALUE: 25
PIF_VALUE: 24
PIF_VALUE: 28
PIF_VALUE: 28
PIF_VALUE: 24
PIF_VALUE: 23
PIF_VALUE: 25
PIF_VALUE: 26
PIF_VALUE: 30
PIF_VALUE: 24
PIF_VALUE: 25
PIF_VALUE: 28
PIF_VALUE: 31
PIF_VALUE: 24
PIF_VALUE: 30
PIF_VALUE: 23
PIF_VALUE: 25
PIF_VALUE: 23
PIF_VALUE: 34
PIF_VALUE: 25
PIF_VALUE: 26
PIF_VALUE: 25
PIF_VALUE: 29
PIF_VALUE: 28
PIF_VALUE: 24
PIF_VALUE: 25
PIF_VALUE: 24

## 2025-01-16 ASSESSMENT — PAIN SCALES - GENERAL
PAINLEVEL_OUTOF10: 0
PAINLEVEL_OUTOF10: 7
PAINLEVEL_OUTOF10: 0

## 2025-01-16 NOTE — CARE COORDINATION
Care Coordination: LOS 3 day.  Vent, Levophed, OGT/TF, restraints, solu-cortef, Zosyn. Needs unclear, LTC [facility].  PIPE and transport completed. Will follow for transition of care    Electronically signed by Stefania Lugo RN on 1/16/2025 at 11:34 AM

## 2025-01-17 LAB
AADO2: 144.7 MMHG
ALBUMIN SERPL-MCNC: 2.8 G/DL (ref 3.5–5.2)
ALP SERPL-CCNC: 116 U/L (ref 35–104)
ALT SERPL-CCNC: 27 U/L (ref 0–32)
ANION GAP SERPL CALCULATED.3IONS-SCNC: 8 MMOL/L (ref 7–16)
AST SERPL-CCNC: 48 U/L (ref 0–31)
B.E.: 4.3 MMOL/L (ref -3–3)
BASOPHILS # BLD: 0.01 K/UL (ref 0–0.2)
BASOPHILS NFR BLD: 0 % (ref 0–2)
BILIRUB SERPL-MCNC: 0.5 MG/DL (ref 0–1.2)
BUN SERPL-MCNC: 25 MG/DL (ref 6–23)
CALCIUM SERPL-MCNC: 7.9 MG/DL (ref 8.6–10.2)
CHLORIDE SERPL-SCNC: 113 MMOL/L (ref 98–107)
CO2 SERPL-SCNC: 27 MMOL/L (ref 22–29)
COHB: 0.3 % (ref 0–1.5)
CREAT SERPL-MCNC: 0.7 MG/DL (ref 0.5–1)
CRITICAL: ABNORMAL
DATE ANALYZED: ABNORMAL
DATE OF COLLECTION: ABNORMAL
EKG ATRIAL RATE: 56 BPM
EKG P AXIS: 41 DEGREES
EKG P-R INTERVAL: 192 MS
EKG Q-T INTERVAL: 518 MS
EKG QRS DURATION: 88 MS
EKG QTC CALCULATION (BAZETT): 499 MS
EKG R AXIS: -28 DEGREES
EKG T AXIS: 133 DEGREES
EKG VENTRICULAR RATE: 56 BPM
EOSINOPHIL # BLD: 0 K/UL (ref 0.05–0.5)
EOSINOPHILS RELATIVE PERCENT: 0 % (ref 0–6)
ERYTHROCYTE [DISTWIDTH] IN BLOOD BY AUTOMATED COUNT: 15.1 % (ref 11.5–15)
FIO2: 40 %
GFR, ESTIMATED: 85 ML/MIN/1.73M2
GLUCOSE SERPL-MCNC: 110 MG/DL (ref 74–99)
HCO3: 26.4 MMOL/L (ref 22–26)
HCT VFR BLD AUTO: 28.4 % (ref 34–48)
HGB BLD-MCNC: 9.3 G/DL (ref 11.5–15.5)
HHB: 2 % (ref 0–5)
IMM GRANULOCYTES # BLD AUTO: 0.09 K/UL (ref 0–0.58)
IMM GRANULOCYTES NFR BLD: 1 % (ref 0–5)
LAB: ABNORMAL
LYMPHOCYTES NFR BLD: 0.7 K/UL (ref 1.5–4)
LYMPHOCYTES RELATIVE PERCENT: 8 % (ref 20–42)
Lab: 458
MAGNESIUM SERPL-MCNC: 2.5 MG/DL (ref 1.6–2.6)
MCH RBC QN AUTO: 30.8 PG (ref 26–35)
MCHC RBC AUTO-ENTMCNC: 32.7 G/DL (ref 32–34.5)
MCV RBC AUTO: 94 FL (ref 80–99.9)
METHB: 0.3 % (ref 0–1.5)
MODE: AC
MONOCYTES NFR BLD: 0.52 K/UL (ref 0.1–0.95)
MONOCYTES NFR BLD: 6 % (ref 2–12)
NEUTROPHILS NFR BLD: 85 % (ref 43–80)
NEUTS SEG NFR BLD: 7.63 K/UL (ref 1.8–7.3)
O2 SATURATION: 98 % (ref 92–98.5)
O2HB: 97.4 % (ref 94–97)
OPERATOR ID: 2962
PATIENT TEMP: 37 C
PCO2: 30.4 MMHG (ref 35–45)
PEEP/CPAP: 8 CMH2O
PFO2: 2.64 MMHG/%
PH BLOOD GAS: 7.56 (ref 7.35–7.45)
PHOSPHATE SERPL-MCNC: 2.5 MG/DL (ref 2.5–4.5)
PLATELET # BLD AUTO: 78 K/UL (ref 130–450)
PLATELET CONFIRMATION: NORMAL
PMV BLD AUTO: 10.7 FL (ref 7–12)
PO2: 105.5 MMHG (ref 75–100)
POTASSIUM SERPL-SCNC: 3.6 MMOL/L (ref 3.5–5)
PROT SERPL-MCNC: 5.6 G/DL (ref 6.4–8.3)
RBC # BLD AUTO: 3.02 M/UL (ref 3.5–5.5)
RI(T): 1.37
RR MECHANICAL: 12 B/MIN
SODIUM SERPL-SCNC: 148 MMOL/L (ref 132–146)
SOURCE, BLOOD GAS: ABNORMAL
THB: 10 G/DL (ref 11.5–16.5)
TIME ANALYZED: 504
VT MECHANICAL: 450 ML
WBC OTHER # BLD: 9 K/UL (ref 4.5–11.5)

## 2025-01-17 PROCEDURE — 99291 CRITICAL CARE FIRST HOUR: CPT | Performed by: INTERNAL MEDICINE

## 2025-01-17 PROCEDURE — 6370000000 HC RX 637 (ALT 250 FOR IP)

## 2025-01-17 PROCEDURE — 99233 SBSQ HOSP IP/OBS HIGH 50: CPT | Performed by: INTERNAL MEDICINE

## 2025-01-17 PROCEDURE — 93010 ELECTROCARDIOGRAM REPORT: CPT | Performed by: INTERNAL MEDICINE

## 2025-01-17 PROCEDURE — 2500000003 HC RX 250 WO HCPCS

## 2025-01-17 PROCEDURE — 2580000003 HC RX 258: Performed by: INTERNAL MEDICINE

## 2025-01-17 PROCEDURE — 94003 VENT MGMT INPAT SUBQ DAY: CPT

## 2025-01-17 PROCEDURE — 84100 ASSAY OF PHOSPHORUS: CPT

## 2025-01-17 PROCEDURE — 6360000002 HC RX W HCPCS

## 2025-01-17 PROCEDURE — 6360000002 HC RX W HCPCS: Performed by: INTERNAL MEDICINE

## 2025-01-17 PROCEDURE — 85025 COMPLETE CBC W/AUTO DIFF WBC: CPT

## 2025-01-17 PROCEDURE — 83735 ASSAY OF MAGNESIUM: CPT

## 2025-01-17 PROCEDURE — 2580000003 HC RX 258

## 2025-01-17 PROCEDURE — 82805 BLOOD GASES W/O2 SATURATION: CPT

## 2025-01-17 PROCEDURE — 2000000000 HC ICU R&B

## 2025-01-17 PROCEDURE — 94640 AIRWAY INHALATION TREATMENT: CPT

## 2025-01-17 PROCEDURE — 80053 COMPREHEN METABOLIC PANEL: CPT

## 2025-01-17 RX ORDER — GUAIFENESIN 200 MG/10ML
200 LIQUID ORAL EVERY 6 HOURS
Status: DISCONTINUED | OUTPATIENT
Start: 2025-01-17 | End: 2025-01-28 | Stop reason: HOSPADM

## 2025-01-17 RX ORDER — HYDROCORTISONE SODIUM SUCCINATE 100 MG/2ML
50 INJECTION INTRAMUSCULAR; INTRAVENOUS 2 TIMES DAILY
Status: DISCONTINUED | OUTPATIENT
Start: 2025-01-17 | End: 2025-01-21

## 2025-01-17 RX ADMIN — PIPERACILLIN AND TAZOBACTAM 4500 MG: 4; .5 INJECTION, POWDER, LYOPHILIZED, FOR SOLUTION INTRAVENOUS at 18:03

## 2025-01-17 RX ADMIN — LEVOTHYROXINE SODIUM 25 MCG: 0.03 TABLET ORAL at 05:33

## 2025-01-17 RX ADMIN — GUAIFENESIN 200 MG: 100 LIQUID ORAL at 20:32

## 2025-01-17 RX ADMIN — SODIUM CHLORIDE, PRESERVATIVE FREE 20 MG: 5 INJECTION INTRAVENOUS at 11:16

## 2025-01-17 RX ADMIN — SODIUM CHLORIDE, PRESERVATIVE FREE 10 ML: 5 INJECTION INTRAVENOUS at 11:17

## 2025-01-17 RX ADMIN — SODIUM CHLORIDE, PRESERVATIVE FREE 20 MG: 5 INJECTION INTRAVENOUS at 20:32

## 2025-01-17 RX ADMIN — IPRATROPIUM BROMIDE AND ALBUTEROL SULFATE 1 DOSE: 2.5; .5 SOLUTION RESPIRATORY (INHALATION) at 12:28

## 2025-01-17 RX ADMIN — ENOXAPARIN SODIUM 40 MG: 100 INJECTION SUBCUTANEOUS at 11:16

## 2025-01-17 RX ADMIN — Medication 15 ML: at 11:17

## 2025-01-17 RX ADMIN — IPRATROPIUM BROMIDE AND ALBUTEROL SULFATE 1 DOSE: 2.5; .5 SOLUTION RESPIRATORY (INHALATION) at 19:04

## 2025-01-17 RX ADMIN — GUAIFENESIN 200 MG: 100 LIQUID ORAL at 05:33

## 2025-01-17 RX ADMIN — GUAIFENESIN 200 MG: 100 LIQUID ORAL at 11:16

## 2025-01-17 RX ADMIN — GUAIFENESIN 200 MG: 100 LIQUID ORAL at 18:05

## 2025-01-17 RX ADMIN — PIPERACILLIN AND TAZOBACTAM 4500 MG: 4; .5 INJECTION, POWDER, LYOPHILIZED, FOR SOLUTION INTRAVENOUS at 02:21

## 2025-01-17 RX ADMIN — HYDROCORTISONE SODIUM SUCCINATE 50 MG: 100 INJECTION, POWDER, FOR SOLUTION INTRAMUSCULAR; INTRAVENOUS at 18:05

## 2025-01-17 RX ADMIN — Medication 15 ML: at 20:32

## 2025-01-17 RX ADMIN — HYDROCORTISONE SODIUM SUCCINATE 50 MG: 100 INJECTION, POWDER, FOR SOLUTION INTRAMUSCULAR; INTRAVENOUS at 02:21

## 2025-01-17 RX ADMIN — PIPERACILLIN AND TAZOBACTAM 4500 MG: 4; .5 INJECTION, POWDER, LYOPHILIZED, FOR SOLUTION INTRAVENOUS at 11:23

## 2025-01-17 RX ADMIN — POTASSIUM BICARBONATE 40 MEQ: 782 TABLET, EFFERVESCENT ORAL at 06:36

## 2025-01-17 RX ADMIN — SODIUM CHLORIDE, PRESERVATIVE FREE 10 ML: 5 INJECTION INTRAVENOUS at 20:32

## 2025-01-17 RX ADMIN — IPRATROPIUM BROMIDE AND ALBUTEROL SULFATE 1 DOSE: 2.5; .5 SOLUTION RESPIRATORY (INHALATION) at 16:05

## 2025-01-17 ASSESSMENT — PAIN SCALES - GENERAL
PAINLEVEL_OUTOF10: 0

## 2025-01-17 ASSESSMENT — PULMONARY FUNCTION TESTS
PIF_VALUE: 25
PIF_VALUE: 25
PIF_VALUE: 24
PIF_VALUE: 29
PIF_VALUE: 32
PIF_VALUE: 25
PIF_VALUE: 25
PIF_VALUE: 26
PIF_VALUE: 25
PIF_VALUE: 25
PIF_VALUE: 26
PIF_VALUE: 26
PIF_VALUE: 31
PIF_VALUE: 25
PIF_VALUE: 26
PIF_VALUE: 26
PIF_VALUE: 27
PIF_VALUE: 26
PIF_VALUE: 31
PIF_VALUE: 45
PIF_VALUE: 26
PIF_VALUE: 27
PIF_VALUE: 24
PIF_VALUE: 23
PIF_VALUE: 27
PIF_VALUE: 24
PIF_VALUE: 25
PIF_VALUE: 25

## 2025-01-17 NOTE — DISCHARGE INSTR - COC
Continuity of Care Form    Patient Name: Jimena Rodriguez   :  1950  MRN:  [id number]    Admit date:  2025  Discharge date:  ***    Code Status Order: Full Code   Advance Directives:   Advance Care Flowsheet Documentation             Admitting Physician:  Carlos Luevano MD  PCP: No primary care provider on file.    Discharging Nurse: ***  Discharging Hospital Unit/Room#: 4406/4406-A  Discharging Unit Phone Number: ***    Emergency Contact:   Extended Emergency Contact Information  Primary Emergency Contact: Jorge Alberto Rodriguez  Address: [address] [place] [address] of [place]  Home Phone: [phone]  Mobile Phone: [phone]  Relation: Spouse  Preferred language: English   needed? No  Secondary Emergency Contact: Dave Lopez  Relation: Brother/Sister    Past Surgical History:  Past Surgical History:   Procedure Laterality Date    CHOLECYSTECTOMY      HYSTERECTOMY (CERVIX STATUS UNKNOWN)      SKIN BIOPSY         Immunization History:     There is no immunization history on file for this patient.    Active Problems:  Patient Active Problem List   Diagnosis Code    Alzheimer's dementia with behavioral disturbance (HCC) G30.9, F02.818    Primary hypertension I10    Vitamin D deficiency E55.9    Unintentional weight loss R63.4    Edema of both lower legs R60.0    Failure to thrive in adult R62.7    Respiratory arrest (HCC) R09.2    Shock R57.9    Cardiac arrest I46.9       Isolation/Infection:   Isolation            No Isolation          Patient Infection Status       None to display                     Nurse Assessment:  Last Vital Signs: BP (!) 117/42   Pulse 59   Temp 98.8 °F (37.1 °C) (Esophageal)   Resp 13   Ht 1.626 m (5' 4\")   Wt 77.9 kg (171 lb 11.8 oz)   SpO2 100%   BMI 29.48 kg/m²     Last documented pain score (0-10 scale): Pain Level: 0  Last Weight:   Wt Readings from Last 1 Encounters:   25 77.9 kg (171 lb 11.8 oz)     Mental

## 2025-01-17 NOTE — CARE COORDINATION
Care Coordination: LOS 4 day. Vent, restraints,Zosyn.  Cardiology and neurology following. Pt was LTC at [facility], needs unclear. Transport and jeison completed, will continue to follow    Electronically signed by Stefania Lugo RN on 1/17/2025 at 2:34 PM

## 2025-01-18 ENCOUNTER — APPOINTMENT (OUTPATIENT)
Dept: GENERAL RADIOLOGY | Age: 75
DRG: 870 | End: 2025-01-18
Payer: MEDICARE

## 2025-01-18 LAB
AADO2: 108 MMHG
ALBUMIN SERPL-MCNC: 2.8 G/DL (ref 3.5–5.2)
ALP SERPL-CCNC: 110 U/L (ref 35–104)
ALT SERPL-CCNC: 32 U/L (ref 0–32)
ANION GAP SERPL CALCULATED.3IONS-SCNC: 9 MMOL/L (ref 7–16)
AST SERPL-CCNC: 50 U/L (ref 0–31)
B.E.: 5 MMOL/L (ref -3–3)
BASOPHILS # BLD: 0.01 K/UL (ref 0–0.2)
BASOPHILS NFR BLD: 0 % (ref 0–2)
BILIRUB SERPL-MCNC: 0.6 MG/DL (ref 0–1.2)
BUN SERPL-MCNC: 27 MG/DL (ref 6–23)
CALCIUM SERPL-MCNC: 8.2 MG/DL (ref 8.6–10.2)
CHLORIDE SERPL-SCNC: 108 MMOL/L (ref 98–107)
CO2 SERPL-SCNC: 27 MMOL/L (ref 22–29)
COHB: 0.3 % (ref 0–1.5)
CREAT SERPL-MCNC: 0.8 MG/DL (ref 0.5–1)
CRITICAL: ABNORMAL
DATE ANALYZED: ABNORMAL
DATE OF COLLECTION: ABNORMAL
EOSINOPHIL # BLD: 0.19 K/UL (ref 0.05–0.5)
EOSINOPHILS RELATIVE PERCENT: 2 % (ref 0–6)
ERYTHROCYTE [DISTWIDTH] IN BLOOD BY AUTOMATED COUNT: 15.3 % (ref 11.5–15)
FIO2: 40 %
GFR, ESTIMATED: 75 ML/MIN/1.73M2
GLUCOSE BLD-MCNC: 150 MG/DL (ref 74–99)
GLUCOSE BLD-MCNC: 157 MG/DL (ref 74–99)
GLUCOSE SERPL-MCNC: 107 MG/DL (ref 74–99)
HCO3: 27.8 MMOL/L (ref 22–26)
HCT VFR BLD AUTO: 27.6 % (ref 34–48)
HGB BLD-MCNC: 9.1 G/DL (ref 11.5–15.5)
HHB: 1.4 % (ref 0–5)
IMM GRANULOCYTES # BLD AUTO: 0.11 K/UL (ref 0–0.58)
IMM GRANULOCYTES NFR BLD: 1 % (ref 0–5)
LAB: ABNORMAL
LYMPHOCYTES NFR BLD: 1.58 K/UL (ref 1.5–4)
LYMPHOCYTES RELATIVE PERCENT: 16 % (ref 20–42)
Lab: 500
MAGNESIUM SERPL-MCNC: 2.3 MG/DL (ref 1.6–2.6)
MCH RBC QN AUTO: 31.3 PG (ref 26–35)
MCHC RBC AUTO-ENTMCNC: 33 G/DL (ref 32–34.5)
MCV RBC AUTO: 94.8 FL (ref 80–99.9)
METHB: 0.4 % (ref 0–1.5)
MICROORGANISM SPEC CULT: NORMAL
MICROORGANISM SPEC CULT: NORMAL
MODE: AC
MONOCYTES NFR BLD: 0.66 K/UL (ref 0.1–0.95)
MONOCYTES NFR BLD: 7 % (ref 2–12)
NEUTROPHILS NFR BLD: 75 % (ref 43–80)
NEUTS SEG NFR BLD: 7.5 K/UL (ref 1.8–7.3)
O2 SATURATION: 98.6 % (ref 92–98.5)
O2HB: 97.9 % (ref 94–97)
OPERATOR ID: 7221
PATIENT TEMP: 37 C
PCO2: 34 MMHG (ref 35–45)
PEEP/CPAP: 8 CMH2O
PFO2: 3.45 MMHG/%
PH BLOOD GAS: 7.53 (ref 7.35–7.45)
PHOSPHATE SERPL-MCNC: 2.4 MG/DL (ref 2.5–4.5)
PLATELET # BLD AUTO: 77 K/UL (ref 130–450)
PLATELET CONFIRMATION: NORMAL
PMV BLD AUTO: 10.8 FL (ref 7–12)
PO2: 138.1 MMHG (ref 75–100)
POTASSIUM SERPL-SCNC: 3.4 MMOL/L (ref 3.5–5)
PROT SERPL-MCNC: 5.6 G/DL (ref 6.4–8.3)
RBC # BLD AUTO: 2.91 M/UL (ref 3.5–5.5)
RI(T): 0.78
RR MECHANICAL: 12 B/MIN
SERVICE CMNT-IMP: NORMAL
SERVICE CMNT-IMP: NORMAL
SODIUM SERPL-SCNC: 144 MMOL/L (ref 132–146)
SOURCE, BLOOD GAS: ABNORMAL
SPECIMEN DESCRIPTION: NORMAL
SPECIMEN DESCRIPTION: NORMAL
THB: 10.3 G/DL (ref 11.5–16.5)
TIME ANALYZED: 503
VT MECHANICAL: 450 ML
WBC OTHER # BLD: 10.1 K/UL (ref 4.5–11.5)

## 2025-01-18 PROCEDURE — 2500000003 HC RX 250 WO HCPCS

## 2025-01-18 PROCEDURE — 2580000003 HC RX 258

## 2025-01-18 PROCEDURE — 84100 ASSAY OF PHOSPHORUS: CPT

## 2025-01-18 PROCEDURE — 2000000000 HC ICU R&B

## 2025-01-18 PROCEDURE — 85025 COMPLETE CBC W/AUTO DIFF WBC: CPT

## 2025-01-18 PROCEDURE — 6360000002 HC RX W HCPCS: Performed by: INTERNAL MEDICINE

## 2025-01-18 PROCEDURE — 83735 ASSAY OF MAGNESIUM: CPT

## 2025-01-18 PROCEDURE — 82805 BLOOD GASES W/O2 SATURATION: CPT

## 2025-01-18 PROCEDURE — 6370000000 HC RX 637 (ALT 250 FOR IP)

## 2025-01-18 PROCEDURE — 6360000002 HC RX W HCPCS

## 2025-01-18 PROCEDURE — 82962 GLUCOSE BLOOD TEST: CPT

## 2025-01-18 PROCEDURE — 71045 X-RAY EXAM CHEST 1 VIEW: CPT

## 2025-01-18 PROCEDURE — 99291 CRITICAL CARE FIRST HOUR: CPT | Performed by: INTERNAL MEDICINE

## 2025-01-18 PROCEDURE — 2580000003 HC RX 258: Performed by: INTERNAL MEDICINE

## 2025-01-18 PROCEDURE — 94640 AIRWAY INHALATION TREATMENT: CPT

## 2025-01-18 PROCEDURE — 80053 COMPREHEN METABOLIC PANEL: CPT

## 2025-01-18 PROCEDURE — 94003 VENT MGMT INPAT SUBQ DAY: CPT

## 2025-01-18 RX ADMIN — IPRATROPIUM BROMIDE AND ALBUTEROL SULFATE 1 DOSE: 2.5; .5 SOLUTION RESPIRATORY (INHALATION) at 13:05

## 2025-01-18 RX ADMIN — GUAIFENESIN 200 MG: 100 LIQUID ORAL at 21:40

## 2025-01-18 RX ADMIN — GUAIFENESIN 200 MG: 100 LIQUID ORAL at 02:37

## 2025-01-18 RX ADMIN — PIPERACILLIN AND TAZOBACTAM 4500 MG: 4; .5 INJECTION, POWDER, LYOPHILIZED, FOR SOLUTION INTRAVENOUS at 02:37

## 2025-01-18 RX ADMIN — HYDROCORTISONE SODIUM SUCCINATE 50 MG: 100 INJECTION, POWDER, FOR SOLUTION INTRAMUSCULAR; INTRAVENOUS at 15:25

## 2025-01-18 RX ADMIN — PIPERACILLIN AND TAZOBACTAM 4500 MG: 4; .5 INJECTION, POWDER, LYOPHILIZED, FOR SOLUTION INTRAVENOUS at 08:47

## 2025-01-18 RX ADMIN — LEVOTHYROXINE SODIUM 25 MCG: 0.03 TABLET ORAL at 05:39

## 2025-01-18 RX ADMIN — Medication 15 ML: at 08:58

## 2025-01-18 RX ADMIN — SODIUM CHLORIDE, PRESERVATIVE FREE 20 MG: 5 INJECTION INTRAVENOUS at 08:47

## 2025-01-18 RX ADMIN — SODIUM CHLORIDE, PRESERVATIVE FREE 10 ML: 5 INJECTION INTRAVENOUS at 21:41

## 2025-01-18 RX ADMIN — POTASSIUM BICARBONATE 40 MEQ: 782 TABLET, EFFERVESCENT ORAL at 06:36

## 2025-01-18 RX ADMIN — Medication 15 ML: at 21:40

## 2025-01-18 RX ADMIN — ACETAMINOPHEN 650 MG: 325 TABLET ORAL at 05:40

## 2025-01-18 RX ADMIN — HYDROCORTISONE SODIUM SUCCINATE 50 MG: 100 INJECTION, POWDER, FOR SOLUTION INTRAMUSCULAR; INTRAVENOUS at 08:46

## 2025-01-18 RX ADMIN — ENOXAPARIN SODIUM 40 MG: 100 INJECTION SUBCUTANEOUS at 08:47

## 2025-01-18 RX ADMIN — IPRATROPIUM BROMIDE AND ALBUTEROL SULFATE 1 DOSE: 2.5; .5 SOLUTION RESPIRATORY (INHALATION) at 19:34

## 2025-01-18 RX ADMIN — PIPERACILLIN AND TAZOBACTAM 4500 MG: 4; .5 INJECTION, POWDER, LYOPHILIZED, FOR SOLUTION INTRAVENOUS at 15:26

## 2025-01-18 RX ADMIN — SODIUM CHLORIDE, PRESERVATIVE FREE 20 MG: 5 INJECTION INTRAVENOUS at 21:40

## 2025-01-18 RX ADMIN — POTASSIUM PHOSPHATE, MONOBASIC AND POTASSIUM PHOSPHATE, DIBASIC 10 MMOL: 224; 236 INJECTION, SOLUTION, CONCENTRATE INTRAVENOUS at 08:57

## 2025-01-18 RX ADMIN — IPRATROPIUM BROMIDE AND ALBUTEROL SULFATE 1 DOSE: 2.5; .5 SOLUTION RESPIRATORY (INHALATION) at 08:55

## 2025-01-18 RX ADMIN — IPRATROPIUM BROMIDE AND ALBUTEROL SULFATE 1 DOSE: 2.5; .5 SOLUTION RESPIRATORY (INHALATION) at 16:32

## 2025-01-18 RX ADMIN — GUAIFENESIN 200 MG: 100 LIQUID ORAL at 08:47

## 2025-01-18 RX ADMIN — GUAIFENESIN 200 MG: 100 LIQUID ORAL at 16:00

## 2025-01-18 ASSESSMENT — PULMONARY FUNCTION TESTS
PIF_VALUE: 25
PIF_VALUE: 18
PIF_VALUE: 18
PIF_VALUE: 30
PIF_VALUE: 18
PIF_VALUE: 24
PIF_VALUE: 25
PIF_VALUE: 25
PIF_VALUE: 26
PIF_VALUE: 24
PIF_VALUE: 26
PIF_VALUE: 39
PIF_VALUE: 18
PIF_VALUE: 19
PIF_VALUE: 18
PIF_VALUE: 25
PIF_VALUE: 45
PIF_VALUE: 26
PIF_VALUE: 24
PIF_VALUE: 18
PIF_VALUE: 18
PIF_VALUE: 26
PIF_VALUE: 18
PIF_VALUE: 26
PIF_VALUE: 18

## 2025-01-18 ASSESSMENT — PAIN SCALES - GENERAL
PAINLEVEL_OUTOF10: 0

## 2025-01-19 LAB
AADO2: 127.8 MMHG
ALBUMIN SERPL-MCNC: 2.9 G/DL (ref 3.5–5.2)
ALP SERPL-CCNC: 107 U/L (ref 35–104)
ALT SERPL-CCNC: 34 U/L (ref 0–32)
ANION GAP SERPL CALCULATED.3IONS-SCNC: 10 MMOL/L (ref 7–16)
AST SERPL-CCNC: 45 U/L (ref 0–31)
B.E.: 0.8 MMOL/L (ref -3–3)
BASOPHILS # BLD: 0.01 K/UL (ref 0–0.2)
BASOPHILS NFR BLD: 0 % (ref 0–2)
BILIRUB SERPL-MCNC: 0.4 MG/DL (ref 0–1.2)
BUN SERPL-MCNC: 25 MG/DL (ref 6–23)
CALCIUM SERPL-MCNC: 8.1 MG/DL (ref 8.6–10.2)
CHLORIDE SERPL-SCNC: 110 MMOL/L (ref 98–107)
CO2 SERPL-SCNC: 26 MMOL/L (ref 22–29)
COHB: 0.3 % (ref 0–1.5)
CREAT SERPL-MCNC: 0.7 MG/DL (ref 0.5–1)
CRITICAL: ABNORMAL
DATE ANALYZED: ABNORMAL
DATE OF COLLECTION: ABNORMAL
EOSINOPHIL # BLD: 0.03 K/UL (ref 0.05–0.5)
EOSINOPHILS RELATIVE PERCENT: 0 % (ref 0–6)
ERYTHROCYTE [DISTWIDTH] IN BLOOD BY AUTOMATED COUNT: 15 % (ref 11.5–15)
FIO2: 40 %
GFR, ESTIMATED: >90 ML/MIN/1.73M2
GLUCOSE BLD-MCNC: 125 MG/DL (ref 74–99)
GLUCOSE SERPL-MCNC: 116 MG/DL (ref 74–99)
HCO3: 22.9 MMOL/L (ref 22–26)
HCT VFR BLD AUTO: 27.3 % (ref 34–48)
HGB BLD-MCNC: 8.8 G/DL (ref 11.5–15.5)
HHB: 1.6 % (ref 0–5)
IMM GRANULOCYTES # BLD AUTO: 0.12 K/UL (ref 0–0.58)
IMM GRANULOCYTES NFR BLD: 1 % (ref 0–5)
LAB: ABNORMAL
LYMPHOCYTES NFR BLD: 1.45 K/UL (ref 1.5–4)
LYMPHOCYTES RELATIVE PERCENT: 14 % (ref 20–42)
Lab: 429
MAGNESIUM SERPL-MCNC: 2.3 MG/DL (ref 1.6–2.6)
MCH RBC QN AUTO: 31 PG (ref 26–35)
MCHC RBC AUTO-ENTMCNC: 32.2 G/DL (ref 32–34.5)
MCV RBC AUTO: 96.1 FL (ref 80–99.9)
METHB: 0.4 % (ref 0–1.5)
MODE: AC
MONOCYTES NFR BLD: 0.74 K/UL (ref 0.1–0.95)
MONOCYTES NFR BLD: 7 % (ref 2–12)
NEUTROPHILS NFR BLD: 77 % (ref 43–80)
NEUTS SEG NFR BLD: 7.9 K/UL (ref 1.8–7.3)
O2 SATURATION: 98.4 % (ref 92–98.5)
O2HB: 97.7 % (ref 94–97)
OPERATOR ID: ABNORMAL
PATIENT TEMP: 37 C
PCO2: 28.3 MMHG (ref 35–45)
PEEP/CPAP: 8 CMH2O
PFO2: 3.12 MMHG/%
PH BLOOD GAS: 7.53 (ref 7.35–7.45)
PHOSPHATE SERPL-MCNC: 2.6 MG/DL (ref 2.5–4.5)
PLATELET # BLD AUTO: 90 K/UL (ref 130–450)
PLATELET CONFIRMATION: NORMAL
PMV BLD AUTO: 11.2 FL (ref 7–12)
PO2: 124.9 MMHG (ref 75–100)
POTASSIUM SERPL-SCNC: 3.3 MMOL/L (ref 3.5–5)
PROT SERPL-MCNC: 5.6 G/DL (ref 6.4–8.3)
RBC # BLD AUTO: 2.84 M/UL (ref 3.5–5.5)
RI(T): 1.02
RR MECHANICAL: 12 B/MIN
SODIUM SERPL-SCNC: 146 MMOL/L (ref 132–146)
SOURCE, BLOOD GAS: ABNORMAL
THB: 10.1 G/DL (ref 11.5–16.5)
TIME ANALYZED: 436
VT MECHANICAL: 450 ML
WBC OTHER # BLD: 10.3 K/UL (ref 4.5–11.5)

## 2025-01-19 PROCEDURE — 94003 VENT MGMT INPAT SUBQ DAY: CPT

## 2025-01-19 PROCEDURE — 2000000000 HC ICU R&B

## 2025-01-19 PROCEDURE — 2500000003 HC RX 250 WO HCPCS

## 2025-01-19 PROCEDURE — 6370000000 HC RX 637 (ALT 250 FOR IP)

## 2025-01-19 PROCEDURE — 82805 BLOOD GASES W/O2 SATURATION: CPT

## 2025-01-19 PROCEDURE — 6360000002 HC RX W HCPCS

## 2025-01-19 PROCEDURE — 99291 CRITICAL CARE FIRST HOUR: CPT | Performed by: INTERNAL MEDICINE

## 2025-01-19 PROCEDURE — 2580000003 HC RX 258

## 2025-01-19 PROCEDURE — 51798 US URINE CAPACITY MEASURE: CPT

## 2025-01-19 PROCEDURE — 94640 AIRWAY INHALATION TREATMENT: CPT

## 2025-01-19 PROCEDURE — 85025 COMPLETE CBC W/AUTO DIFF WBC: CPT

## 2025-01-19 PROCEDURE — 2580000003 HC RX 258: Performed by: INTERNAL MEDICINE

## 2025-01-19 PROCEDURE — 84100 ASSAY OF PHOSPHORUS: CPT

## 2025-01-19 PROCEDURE — 6360000002 HC RX W HCPCS: Performed by: INTERNAL MEDICINE

## 2025-01-19 PROCEDURE — 80053 COMPREHEN METABOLIC PANEL: CPT

## 2025-01-19 PROCEDURE — 82962 GLUCOSE BLOOD TEST: CPT

## 2025-01-19 PROCEDURE — 83735 ASSAY OF MAGNESIUM: CPT

## 2025-01-19 RX ADMIN — SODIUM CHLORIDE, PRESERVATIVE FREE 10 ML: 5 INJECTION INTRAVENOUS at 20:01

## 2025-01-19 RX ADMIN — POTASSIUM BICARBONATE 40 MEQ: 782 TABLET, EFFERVESCENT ORAL at 06:43

## 2025-01-19 RX ADMIN — IPRATROPIUM BROMIDE AND ALBUTEROL SULFATE 1 DOSE: 2.5; .5 SOLUTION RESPIRATORY (INHALATION) at 16:09

## 2025-01-19 RX ADMIN — IPRATROPIUM BROMIDE AND ALBUTEROL SULFATE 1 DOSE: 2.5; .5 SOLUTION RESPIRATORY (INHALATION) at 12:04

## 2025-01-19 RX ADMIN — HYDROCORTISONE SODIUM SUCCINATE 50 MG: 100 INJECTION, POWDER, FOR SOLUTION INTRAMUSCULAR; INTRAVENOUS at 15:00

## 2025-01-19 RX ADMIN — GUAIFENESIN 200 MG: 100 LIQUID ORAL at 20:01

## 2025-01-19 RX ADMIN — IPRATROPIUM BROMIDE AND ALBUTEROL SULFATE 1 DOSE: 2.5; .5 SOLUTION RESPIRATORY (INHALATION) at 08:54

## 2025-01-19 RX ADMIN — ENOXAPARIN SODIUM 40 MG: 100 INJECTION SUBCUTANEOUS at 07:35

## 2025-01-19 RX ADMIN — SODIUM CHLORIDE, PRESERVATIVE FREE 20 MG: 5 INJECTION INTRAVENOUS at 07:35

## 2025-01-19 RX ADMIN — GUAIFENESIN 200 MG: 100 LIQUID ORAL at 15:00

## 2025-01-19 RX ADMIN — PIPERACILLIN AND TAZOBACTAM 4500 MG: 4; .5 INJECTION, POWDER, LYOPHILIZED, FOR SOLUTION INTRAVENOUS at 16:50

## 2025-01-19 RX ADMIN — IPRATROPIUM BROMIDE AND ALBUTEROL SULFATE 1 DOSE: 2.5; .5 SOLUTION RESPIRATORY (INHALATION) at 19:53

## 2025-01-19 RX ADMIN — GUAIFENESIN 200 MG: 100 LIQUID ORAL at 04:32

## 2025-01-19 RX ADMIN — HYDROCORTISONE SODIUM SUCCINATE 50 MG: 100 INJECTION, POWDER, FOR SOLUTION INTRAMUSCULAR; INTRAVENOUS at 07:35

## 2025-01-19 RX ADMIN — PIPERACILLIN AND TAZOBACTAM 4500 MG: 4; .5 INJECTION, POWDER, LYOPHILIZED, FOR SOLUTION INTRAVENOUS at 02:32

## 2025-01-19 RX ADMIN — PIPERACILLIN AND TAZOBACTAM 4500 MG: 4; .5 INJECTION, POWDER, LYOPHILIZED, FOR SOLUTION INTRAVENOUS at 07:36

## 2025-01-19 RX ADMIN — GUAIFENESIN 200 MG: 100 LIQUID ORAL at 09:48

## 2025-01-19 RX ADMIN — Medication 15 ML: at 07:34

## 2025-01-19 RX ADMIN — Medication 15 ML: at 19:58

## 2025-01-19 RX ADMIN — SODIUM CHLORIDE, PRESERVATIVE FREE 20 MG: 5 INJECTION INTRAVENOUS at 19:58

## 2025-01-19 RX ADMIN — LEVOTHYROXINE SODIUM 25 MCG: 0.03 TABLET ORAL at 06:43

## 2025-01-19 ASSESSMENT — PULMONARY FUNCTION TESTS
PIF_VALUE: 27
PIF_VALUE: 29
PIF_VALUE: 25
PIF_VALUE: 31
PIF_VALUE: 29
PIF_VALUE: 29
PIF_VALUE: 25
PIF_VALUE: 31
PIF_VALUE: 26
PIF_VALUE: 28
PIF_VALUE: 29
PIF_VALUE: 31
PIF_VALUE: 26
PIF_VALUE: 28
PIF_VALUE: 25
PIF_VALUE: 27
PIF_VALUE: 30
PIF_VALUE: 26
PIF_VALUE: 25
PIF_VALUE: 33
PIF_VALUE: 32
PIF_VALUE: 26
PIF_VALUE: 29
PIF_VALUE: 28
PIF_VALUE: 29

## 2025-01-19 ASSESSMENT — PAIN SCALES - GENERAL
PAINLEVEL_OUTOF10: 0

## 2025-01-20 PROBLEM — Z71.89 GOALS OF CARE, COUNSELING/DISCUSSION: Status: ACTIVE | Noted: 2025-01-20

## 2025-01-20 PROBLEM — Z51.5 PALLIATIVE CARE ENCOUNTER: Status: ACTIVE | Noted: 2025-01-20

## 2025-01-20 LAB
AADO2: 119.1 MMHG
ALBUMIN SERPL-MCNC: 2.9 G/DL (ref 3.5–5.2)
ALP SERPL-CCNC: 104 U/L (ref 35–104)
ALT SERPL-CCNC: 37 U/L (ref 0–32)
ANION GAP SERPL CALCULATED.3IONS-SCNC: 10 MMOL/L (ref 7–16)
ANION GAP SERPL CALCULATED.3IONS-SCNC: 12 MMOL/L (ref 7–16)
ANION GAP SERPL CALCULATED.3IONS-SCNC: 13 MMOL/L (ref 7–16)
AST SERPL-CCNC: 47 U/L (ref 0–31)
B.E.: 3.7 MMOL/L (ref -3–3)
BASOPHILS # BLD: 0.01 K/UL (ref 0–0.2)
BASOPHILS NFR BLD: 0 % (ref 0–2)
BILIRUB SERPL-MCNC: 0.5 MG/DL (ref 0–1.2)
BUN SERPL-MCNC: 22 MG/DL (ref 6–23)
BUN SERPL-MCNC: 23 MG/DL (ref 6–23)
BUN SERPL-MCNC: 25 MG/DL (ref 6–23)
CALCIUM SERPL-MCNC: 7.7 MG/DL (ref 8.6–10.2)
CALCIUM SERPL-MCNC: 8.1 MG/DL (ref 8.6–10.2)
CALCIUM SERPL-MCNC: 9.2 MG/DL (ref 8.6–10.2)
CHLORIDE SERPL-SCNC: 101 MMOL/L (ref 98–107)
CHLORIDE SERPL-SCNC: 106 MMOL/L (ref 98–107)
CHLORIDE SERPL-SCNC: 106 MMOL/L (ref 98–107)
CO2 SERPL-SCNC: 25 MMOL/L (ref 22–29)
CO2 SERPL-SCNC: 26 MMOL/L (ref 22–29)
CO2 SERPL-SCNC: 27 MMOL/L (ref 22–29)
COHB: 0 % (ref 0–1.5)
CREAT SERPL-MCNC: 0.7 MG/DL (ref 0.5–1)
CRITICAL: ABNORMAL
DATE ANALYZED: ABNORMAL
DATE OF COLLECTION: ABNORMAL
EOSINOPHIL # BLD: 0.03 K/UL (ref 0.05–0.5)
EOSINOPHILS RELATIVE PERCENT: 0 % (ref 0–6)
ERYTHROCYTE [DISTWIDTH] IN BLOOD BY AUTOMATED COUNT: 14 % (ref 11.5–15)
ERYTHROCYTE [DISTWIDTH] IN BLOOD BY AUTOMATED COUNT: 14.9 % (ref 11.5–15)
ERYTHROCYTE [DISTWIDTH] IN BLOOD BY AUTOMATED COUNT: 15.2 % (ref 11.5–15)
FIO2: 40 %
GFR, ESTIMATED: >90 ML/MIN/1.73M2
GLUCOSE SERPL-MCNC: 115 MG/DL (ref 74–99)
GLUCOSE SERPL-MCNC: 150 MG/DL (ref 74–99)
GLUCOSE SERPL-MCNC: 94 MG/DL (ref 74–99)
HCO3: 26.4 MMOL/L (ref 22–26)
HCT VFR BLD AUTO: 22.3 % (ref 34–48)
HCT VFR BLD AUTO: 26.4 % (ref 34–48)
HCT VFR BLD AUTO: 26.7 % (ref 34–48)
HGB BLD-MCNC: 7.4 G/DL (ref 11.5–15.5)
HGB BLD-MCNC: 8.6 G/DL (ref 11.5–15.5)
HGB BLD-MCNC: 8.7 G/DL (ref 11.5–15.5)
HHB: 1.5 % (ref 0–5)
IMM GRANULOCYTES # BLD AUTO: 0.14 K/UL (ref 0–0.58)
IMM GRANULOCYTES NFR BLD: 1 % (ref 0–5)
LAB: ABNORMAL
LYMPHOCYTES NFR BLD: 1.61 K/UL (ref 1.5–4)
LYMPHOCYTES RELATIVE PERCENT: 14 % (ref 20–42)
Lab: 420
MAGNESIUM SERPL-MCNC: 1.5 MG/DL (ref 1.6–2.6)
MAGNESIUM SERPL-MCNC: 2.2 MG/DL (ref 1.6–2.6)
MAGNESIUM SERPL-MCNC: 2.3 MG/DL (ref 1.6–2.6)
MCH RBC QN AUTO: 29.5 PG (ref 26–35)
MCH RBC QN AUTO: 31.2 PG (ref 26–35)
MCH RBC QN AUTO: 31.5 PG (ref 26–35)
MCHC RBC AUTO-ENTMCNC: 32.2 G/DL (ref 32–34.5)
MCHC RBC AUTO-ENTMCNC: 33 G/DL (ref 32–34.5)
MCHC RBC AUTO-ENTMCNC: 33.2 G/DL (ref 32–34.5)
MCV RBC AUTO: 88.8 FL (ref 80–99.9)
MCV RBC AUTO: 95.7 FL (ref 80–99.9)
MCV RBC AUTO: 96.7 FL (ref 80–99.9)
METHB: 0.4 % (ref 0–1.5)
MODE: AC
MONOCYTES NFR BLD: 0.67 K/UL (ref 0.1–0.95)
MONOCYTES NFR BLD: 6 % (ref 2–12)
NEUTROPHILS NFR BLD: 79 % (ref 43–80)
NEUTS SEG NFR BLD: 9.02 K/UL (ref 1.8–7.3)
O2 SATURATION: 98.5 % (ref 92–98.5)
O2HB: 98.1 % (ref 94–97)
OPERATOR ID: 3214
PATIENT TEMP: 37 C
PCO2: 32.9 MMHG (ref 35–45)
PEEP/CPAP: 8 CMH2O
PFO2: 3.21 MMHG/%
PH BLOOD GAS: 7.52 (ref 7.35–7.45)
PHOSPHATE SERPL-MCNC: 2 MG/DL (ref 2.5–4.5)
PHOSPHATE SERPL-MCNC: 2.1 MG/DL (ref 2.5–4.5)
PHOSPHATE SERPL-MCNC: 3.5 MG/DL (ref 2.5–4.5)
PLATELET # BLD AUTO: 310 K/UL (ref 130–450)
PLATELET # BLD AUTO: 92 K/UL (ref 130–450)
PLATELET CONFIRMATION: NORMAL
PLATELET CONFIRMATION: NORMAL
PLATELET, FLUORESCENCE: 94 K/UL (ref 130–450)
PMV BLD AUTO: 11.5 FL (ref 7–12)
PMV BLD AUTO: 11.8 FL (ref 7–12)
PMV BLD AUTO: 9.1 FL (ref 7–12)
PO2: 128.3 MMHG (ref 75–100)
POTASSIUM SERPL-SCNC: 3.1 MMOL/L (ref 3.5–5)
POTASSIUM SERPL-SCNC: 3.2 MMOL/L (ref 3.5–5)
POTASSIUM SERPL-SCNC: 4 MMOL/L (ref 3.5–5)
PROT SERPL-MCNC: 5.6 G/DL (ref 6.4–8.3)
RBC # BLD AUTO: 2.51 M/UL (ref 3.5–5.5)
RBC # BLD AUTO: 2.76 M/UL (ref 3.5–5.5)
RBC # BLD AUTO: 2.76 M/UL (ref 3.5–5.5)
RI(T): 0.93
RR MECHANICAL: 12 B/MIN
SODIUM SERPL-SCNC: 140 MMOL/L (ref 132–146)
SODIUM SERPL-SCNC: 142 MMOL/L (ref 132–146)
SODIUM SERPL-SCNC: 144 MMOL/L (ref 132–146)
SOURCE, BLOOD GAS: ABNORMAL
THB: 9.3 G/DL (ref 11.5–16.5)
TIME ANALYZED: 423
VT MECHANICAL: 450 ML
WBC OTHER # BLD: 11.5 K/UL (ref 4.5–11.5)
WBC OTHER # BLD: 12.4 K/UL (ref 4.5–11.5)
WBC OTHER # BLD: 8 K/UL (ref 4.5–11.5)

## 2025-01-20 PROCEDURE — 2500000003 HC RX 250 WO HCPCS: Performed by: INTERNAL MEDICINE

## 2025-01-20 PROCEDURE — 6360000002 HC RX W HCPCS

## 2025-01-20 PROCEDURE — 85025 COMPLETE CBC W/AUTO DIFF WBC: CPT

## 2025-01-20 PROCEDURE — 6370000000 HC RX 637 (ALT 250 FOR IP)

## 2025-01-20 PROCEDURE — 94640 AIRWAY INHALATION TREATMENT: CPT

## 2025-01-20 PROCEDURE — 2500000003 HC RX 250 WO HCPCS

## 2025-01-20 PROCEDURE — 94003 VENT MGMT INPAT SUBQ DAY: CPT

## 2025-01-20 PROCEDURE — 2580000003 HC RX 258: Performed by: INTERNAL MEDICINE

## 2025-01-20 PROCEDURE — 99291 CRITICAL CARE FIRST HOUR: CPT | Performed by: INTERNAL MEDICINE

## 2025-01-20 PROCEDURE — 83735 ASSAY OF MAGNESIUM: CPT

## 2025-01-20 PROCEDURE — 2580000003 HC RX 258

## 2025-01-20 PROCEDURE — 99222 1ST HOSP IP/OBS MODERATE 55: CPT | Performed by: NURSE PRACTITIONER

## 2025-01-20 PROCEDURE — 84100 ASSAY OF PHOSPHORUS: CPT

## 2025-01-20 PROCEDURE — 6360000002 HC RX W HCPCS: Performed by: INTERNAL MEDICINE

## 2025-01-20 PROCEDURE — 80048 BASIC METABOLIC PNL TOTAL CA: CPT

## 2025-01-20 PROCEDURE — 82805 BLOOD GASES W/O2 SATURATION: CPT

## 2025-01-20 PROCEDURE — 51798 US URINE CAPACITY MEASURE: CPT

## 2025-01-20 PROCEDURE — 2000000000 HC ICU R&B

## 2025-01-20 PROCEDURE — 80053 COMPREHEN METABOLIC PANEL: CPT

## 2025-01-20 PROCEDURE — 85027 COMPLETE CBC AUTOMATED: CPT

## 2025-01-20 RX ADMIN — POTASSIUM CHLORIDE 20 MEQ: 29.8 INJECTION, SOLUTION INTRAVENOUS at 07:37

## 2025-01-20 RX ADMIN — GUAIFENESIN 200 MG: 100 LIQUID ORAL at 02:14

## 2025-01-20 RX ADMIN — ENOXAPARIN SODIUM 40 MG: 100 INJECTION SUBCUTANEOUS at 08:00

## 2025-01-20 RX ADMIN — LEVOTHYROXINE SODIUM 25 MCG: 0.03 TABLET ORAL at 05:17

## 2025-01-20 RX ADMIN — GUAIFENESIN 200 MG: 100 LIQUID ORAL at 09:55

## 2025-01-20 RX ADMIN — IPRATROPIUM BROMIDE AND ALBUTEROL SULFATE 1 DOSE: 2.5; .5 SOLUTION RESPIRATORY (INHALATION) at 09:02

## 2025-01-20 RX ADMIN — POTASSIUM PHOSPHATE, MONOBASIC AND POTASSIUM PHOSPHATE, DIBASIC 15 MMOL: 224; 236 INJECTION, SOLUTION, CONCENTRATE INTRAVENOUS at 10:12

## 2025-01-20 RX ADMIN — HYDROCORTISONE SODIUM SUCCINATE 50 MG: 100 INJECTION, POWDER, FOR SOLUTION INTRAMUSCULAR; INTRAVENOUS at 18:12

## 2025-01-20 RX ADMIN — SODIUM CHLORIDE, PRESERVATIVE FREE 20 MG: 5 INJECTION INTRAVENOUS at 20:53

## 2025-01-20 RX ADMIN — SODIUM CHLORIDE, PRESERVATIVE FREE 10 ML: 5 INJECTION INTRAVENOUS at 08:12

## 2025-01-20 RX ADMIN — PIPERACILLIN AND TAZOBACTAM 4500 MG: 4; .5 INJECTION, POWDER, LYOPHILIZED, FOR SOLUTION INTRAVENOUS at 18:17

## 2025-01-20 RX ADMIN — Medication 15 ML: at 08:00

## 2025-01-20 RX ADMIN — PIPERACILLIN AND TAZOBACTAM 4500 MG: 4; .5 INJECTION, POWDER, LYOPHILIZED, FOR SOLUTION INTRAVENOUS at 02:14

## 2025-01-20 RX ADMIN — Medication 15 ML: at 20:53

## 2025-01-20 RX ADMIN — GUAIFENESIN 200 MG: 100 LIQUID ORAL at 20:53

## 2025-01-20 RX ADMIN — PIPERACILLIN AND TAZOBACTAM 4500 MG: 4; .5 INJECTION, POWDER, LYOPHILIZED, FOR SOLUTION INTRAVENOUS at 09:55

## 2025-01-20 RX ADMIN — IPRATROPIUM BROMIDE AND ALBUTEROL SULFATE 1 DOSE: 2.5; .5 SOLUTION RESPIRATORY (INHALATION) at 20:37

## 2025-01-20 RX ADMIN — IPRATROPIUM BROMIDE AND ALBUTEROL SULFATE 1 DOSE: 2.5; .5 SOLUTION RESPIRATORY (INHALATION) at 12:29

## 2025-01-20 RX ADMIN — POTASSIUM CHLORIDE 20 MEQ: 29.8 INJECTION, SOLUTION INTRAVENOUS at 05:46

## 2025-01-20 RX ADMIN — SODIUM CHLORIDE, PRESERVATIVE FREE 20 MG: 5 INJECTION INTRAVENOUS at 08:00

## 2025-01-20 RX ADMIN — SODIUM CHLORIDE, PRESERVATIVE FREE 10 ML: 5 INJECTION INTRAVENOUS at 20:53

## 2025-01-20 RX ADMIN — IPRATROPIUM BROMIDE AND ALBUTEROL SULFATE 1 DOSE: 2.5; .5 SOLUTION RESPIRATORY (INHALATION) at 15:29

## 2025-01-20 RX ADMIN — HYDROCORTISONE SODIUM SUCCINATE 50 MG: 100 INJECTION, POWDER, FOR SOLUTION INTRAMUSCULAR; INTRAVENOUS at 08:00

## 2025-01-20 RX ADMIN — GUAIFENESIN 200 MG: 100 LIQUID ORAL at 18:12

## 2025-01-20 ASSESSMENT — PULMONARY FUNCTION TESTS
PIF_VALUE: 20
PIF_VALUE: 13
PIF_VALUE: 20
PIF_VALUE: 21
PIF_VALUE: 20
PIF_VALUE: 27
PIF_VALUE: 20
PIF_VALUE: 27
PIF_VALUE: 30
PIF_VALUE: 21
PIF_VALUE: 31
PIF_VALUE: 29
PIF_VALUE: 18
PIF_VALUE: 20
PIF_VALUE: 21
PIF_VALUE: 34
PIF_VALUE: 20
PIF_VALUE: 20
PIF_VALUE: 29
PIF_VALUE: 21
PIF_VALUE: 17
PIF_VALUE: 20
PIF_VALUE: 20
PIF_VALUE: 27
PIF_VALUE: 13
PIF_VALUE: 20
PIF_VALUE: 38
PIF_VALUE: 44

## 2025-01-20 ASSESSMENT — PAIN SCALES - GENERAL
PAINLEVEL_OUTOF10: 0

## 2025-01-21 ENCOUNTER — APPOINTMENT (OUTPATIENT)
Dept: GENERAL RADIOLOGY | Age: 75
DRG: 870 | End: 2025-01-21
Payer: MEDICARE

## 2025-01-21 LAB
AADO2: 139.7 MMHG
ALBUMIN SERPL-MCNC: 2.5 G/DL (ref 3.5–5.2)
ALP SERPL-CCNC: 99 U/L (ref 35–104)
ALT SERPL-CCNC: 36 U/L (ref 0–32)
ANION GAP SERPL CALCULATED.3IONS-SCNC: 10 MMOL/L (ref 7–16)
AST SERPL-CCNC: 38 U/L (ref 0–31)
B.E.: 2.2 MMOL/L (ref -3–3)
BASOPHILS # BLD: 0 K/UL (ref 0–0.2)
BASOPHILS NFR BLD: 0 % (ref 0–2)
BILIRUB SERPL-MCNC: 0.4 MG/DL (ref 0–1.2)
BUN SERPL-MCNC: 19 MG/DL (ref 6–23)
CALCIUM SERPL-MCNC: 7.7 MG/DL (ref 8.6–10.2)
CHLORIDE SERPL-SCNC: 107 MMOL/L (ref 98–107)
CO2 SERPL-SCNC: 24 MMOL/L (ref 22–29)
COHB: 0.2 % (ref 0–1.5)
CREAT SERPL-MCNC: 0.7 MG/DL (ref 0.5–1)
CRITICAL: ABNORMAL
DATE ANALYZED: ABNORMAL
DATE OF COLLECTION: ABNORMAL
EOSINOPHIL # BLD: 0.09 K/UL (ref 0.05–0.5)
EOSINOPHILS RELATIVE PERCENT: 1 % (ref 0–6)
ERYTHROCYTE [DISTWIDTH] IN BLOOD BY AUTOMATED COUNT: 15.5 % (ref 11.5–15)
FIO2: 40 %
GFR, ESTIMATED: >90 ML/MIN/1.73M2
GLUCOSE SERPL-MCNC: 132 MG/DL (ref 74–99)
HCO3: 24.9 MMOL/L (ref 22–26)
HCT VFR BLD AUTO: 25.4 % (ref 34–48)
HGB BLD-MCNC: 8.2 G/DL (ref 11.5–15.5)
HHB: 1.8 % (ref 0–5)
IMM GRANULOCYTES # BLD AUTO: 0.13 K/UL (ref 0–0.58)
IMM GRANULOCYTES NFR BLD: 1 % (ref 0–5)
INR PPP: 1.1
LAB: ABNORMAL
LYMPHOCYTES NFR BLD: 1.32 K/UL (ref 1.5–4)
LYMPHOCYTES RELATIVE PERCENT: 12 % (ref 20–42)
Lab: 420
MAGNESIUM SERPL-MCNC: 2.2 MG/DL (ref 1.6–2.6)
MCH RBC QN AUTO: 30.8 PG (ref 26–35)
MCHC RBC AUTO-ENTMCNC: 32.3 G/DL (ref 32–34.5)
MCV RBC AUTO: 95.5 FL (ref 80–99.9)
METHB: 0.5 % (ref 0–1.5)
MODE: AC
MONOCYTES NFR BLD: 0.61 K/UL (ref 0.1–0.95)
MONOCYTES NFR BLD: 6 % (ref 2–12)
NEUTROPHILS NFR BLD: 80 % (ref 43–80)
NEUTS SEG NFR BLD: 8.47 K/UL (ref 1.8–7.3)
O2 SATURATION: 98.2 % (ref 92–98.5)
O2HB: 97.5 % (ref 94–97)
OPERATOR ID: ABNORMAL
PARTIAL THROMBOPLASTIN TIME: 27.1 SEC (ref 24.5–35.1)
PATIENT TEMP: 37 C
PCO2: 31 MMHG (ref 35–45)
PEEP/CPAP: 8 CMH2O
PFO2: 2.75 MMHG/%
PH BLOOD GAS: 7.52 (ref 7.35–7.45)
PHOSPHATE SERPL-MCNC: 2.3 MG/DL (ref 2.5–4.5)
PLATELET # BLD AUTO: 104 K/UL (ref 130–450)
PMV BLD AUTO: 12 FL (ref 7–12)
PO2: 109.9 MMHG (ref 75–100)
POTASSIUM SERPL-SCNC: 3.5 MMOL/L (ref 3.5–5)
PROT SERPL-MCNC: 5.1 G/DL (ref 6.4–8.3)
PROTHROMBIN TIME: 12.4 SEC (ref 9.3–12.4)
RBC # BLD AUTO: 2.66 M/UL (ref 3.5–5.5)
RI(T): 1.27
RR MECHANICAL: 12 B/MIN
SODIUM SERPL-SCNC: 141 MMOL/L (ref 132–146)
SOURCE, BLOOD GAS: ABNORMAL
THB: 9 G/DL (ref 11.5–16.5)
TIME ANALYZED: 427
VT MECHANICAL: 450 ML
WBC OTHER # BLD: 10.6 K/UL (ref 4.5–11.5)

## 2025-01-21 PROCEDURE — 2500000003 HC RX 250 WO HCPCS

## 2025-01-21 PROCEDURE — 2580000003 HC RX 258

## 2025-01-21 PROCEDURE — 6360000002 HC RX W HCPCS: Performed by: NURSE PRACTITIONER

## 2025-01-21 PROCEDURE — 97110 THERAPEUTIC EXERCISES: CPT

## 2025-01-21 PROCEDURE — 2500000003 HC RX 250 WO HCPCS: Performed by: INTERNAL MEDICINE

## 2025-01-21 PROCEDURE — 85730 THROMBOPLASTIN TIME PARTIAL: CPT

## 2025-01-21 PROCEDURE — 97530 THERAPEUTIC ACTIVITIES: CPT

## 2025-01-21 PROCEDURE — 6360000002 HC RX W HCPCS: Performed by: INTERNAL MEDICINE

## 2025-01-21 PROCEDURE — 6370000000 HC RX 637 (ALT 250 FOR IP)

## 2025-01-21 PROCEDURE — 99291 CRITICAL CARE FIRST HOUR: CPT | Performed by: INTERNAL MEDICINE

## 2025-01-21 PROCEDURE — 2580000003 HC RX 258: Performed by: INTERNAL MEDICINE

## 2025-01-21 PROCEDURE — 94640 AIRWAY INHALATION TREATMENT: CPT

## 2025-01-21 PROCEDURE — 6360000002 HC RX W HCPCS

## 2025-01-21 PROCEDURE — 97163 PT EVAL HIGH COMPLEX 45 MIN: CPT

## 2025-01-21 PROCEDURE — 84100 ASSAY OF PHOSPHORUS: CPT

## 2025-01-21 PROCEDURE — 2500000003 HC RX 250 WO HCPCS: Performed by: NURSE PRACTITIONER

## 2025-01-21 PROCEDURE — 83735 ASSAY OF MAGNESIUM: CPT

## 2025-01-21 PROCEDURE — 2580000003 HC RX 258: Performed by: NURSE PRACTITIONER

## 2025-01-21 PROCEDURE — 71045 X-RAY EXAM CHEST 1 VIEW: CPT

## 2025-01-21 PROCEDURE — 2000000000 HC ICU R&B

## 2025-01-21 PROCEDURE — 85025 COMPLETE CBC W/AUTO DIFF WBC: CPT

## 2025-01-21 PROCEDURE — 80053 COMPREHEN METABOLIC PANEL: CPT

## 2025-01-21 PROCEDURE — 94003 VENT MGMT INPAT SUBQ DAY: CPT

## 2025-01-21 PROCEDURE — 82805 BLOOD GASES W/O2 SATURATION: CPT

## 2025-01-21 PROCEDURE — 85610 PROTHROMBIN TIME: CPT

## 2025-01-21 PROCEDURE — 97166 OT EVAL MOD COMPLEX 45 MIN: CPT

## 2025-01-21 RX ORDER — CALCIUM GLUCONATE 20 MG/ML
1000 INJECTION, SOLUTION INTRAVENOUS ONCE
Status: COMPLETED | OUTPATIENT
Start: 2025-01-21 | End: 2025-01-21

## 2025-01-21 RX ADMIN — GUAIFENESIN 200 MG: 100 LIQUID ORAL at 04:30

## 2025-01-21 RX ADMIN — SODIUM CHLORIDE, PRESERVATIVE FREE 10 ML: 5 INJECTION INTRAVENOUS at 08:25

## 2025-01-21 RX ADMIN — GUAIFENESIN 200 MG: 100 LIQUID ORAL at 22:12

## 2025-01-21 RX ADMIN — PIPERACILLIN AND TAZOBACTAM 4500 MG: 4; .5 INJECTION, POWDER, LYOPHILIZED, FOR SOLUTION INTRAVENOUS at 10:27

## 2025-01-21 RX ADMIN — Medication 15 ML: at 22:12

## 2025-01-21 RX ADMIN — ENOXAPARIN SODIUM 40 MG: 100 INJECTION SUBCUTANEOUS at 08:24

## 2025-01-21 RX ADMIN — GUAIFENESIN 200 MG: 100 LIQUID ORAL at 10:26

## 2025-01-21 RX ADMIN — SODIUM CHLORIDE, PRESERVATIVE FREE 20 MG: 5 INJECTION INTRAVENOUS at 22:11

## 2025-01-21 RX ADMIN — GUAIFENESIN 200 MG: 100 LIQUID ORAL at 18:15

## 2025-01-21 RX ADMIN — IPRATROPIUM BROMIDE AND ALBUTEROL SULFATE 1 DOSE: 2.5; .5 SOLUTION RESPIRATORY (INHALATION) at 08:54

## 2025-01-21 RX ADMIN — LEVOTHYROXINE SODIUM 25 MCG: 0.03 TABLET ORAL at 06:04

## 2025-01-21 RX ADMIN — IPRATROPIUM BROMIDE AND ALBUTEROL SULFATE 1 DOSE: 2.5; .5 SOLUTION RESPIRATORY (INHALATION) at 16:08

## 2025-01-21 RX ADMIN — SODIUM CHLORIDE, PRESERVATIVE FREE 10 ML: 5 INJECTION INTRAVENOUS at 22:11

## 2025-01-21 RX ADMIN — POTASSIUM PHOSPHATE, MONOBASIC AND POTASSIUM PHOSPHATE, DIBASIC 30 MMOL: 224; 236 INJECTION, SOLUTION, CONCENTRATE INTRAVENOUS at 13:52

## 2025-01-21 RX ADMIN — PIPERACILLIN AND TAZOBACTAM 4500 MG: 4; .5 INJECTION, POWDER, LYOPHILIZED, FOR SOLUTION INTRAVENOUS at 02:37

## 2025-01-21 RX ADMIN — POTASSIUM PHOSPHATE, MONOBASIC AND POTASSIUM PHOSPHATE, DIBASIC 15 MMOL: 224; 236 INJECTION, SOLUTION, CONCENTRATE INTRAVENOUS at 09:15

## 2025-01-21 RX ADMIN — IPRATROPIUM BROMIDE AND ALBUTEROL SULFATE 1 DOSE: 2.5; .5 SOLUTION RESPIRATORY (INHALATION) at 11:41

## 2025-01-21 RX ADMIN — IPRATROPIUM BROMIDE AND ALBUTEROL SULFATE 1 DOSE: 2.5; .5 SOLUTION RESPIRATORY (INHALATION) at 20:02

## 2025-01-21 RX ADMIN — CALCIUM GLUCONATE 1000 MG: 20 INJECTION, SOLUTION INTRAVENOUS at 06:41

## 2025-01-21 RX ADMIN — HYDROCORTISONE SODIUM SUCCINATE 50 MG: 100 INJECTION, POWDER, FOR SOLUTION INTRAMUSCULAR; INTRAVENOUS at 08:24

## 2025-01-21 RX ADMIN — Medication 15 ML: at 08:23

## 2025-01-21 RX ADMIN — PIPERACILLIN AND TAZOBACTAM 4500 MG: 4; .5 INJECTION, POWDER, LYOPHILIZED, FOR SOLUTION INTRAVENOUS at 18:17

## 2025-01-21 RX ADMIN — SODIUM CHLORIDE, PRESERVATIVE FREE 20 MG: 5 INJECTION INTRAVENOUS at 08:24

## 2025-01-21 ASSESSMENT — PULMONARY FUNCTION TESTS
PIF_VALUE: 32
PIF_VALUE: 26
PIF_VALUE: 27
PIF_VALUE: 17
PIF_VALUE: 26
PIF_VALUE: 28
PIF_VALUE: 25
PIF_VALUE: 26
PIF_VALUE: 15
PIF_VALUE: 20
PIF_VALUE: 13
PIF_VALUE: 26
PIF_VALUE: 39
PIF_VALUE: 29
PIF_VALUE: 26
PIF_VALUE: 15
PIF_VALUE: 26
PIF_VALUE: 20
PIF_VALUE: 27
PIF_VALUE: 31
PIF_VALUE: 29
PIF_VALUE: 29
PIF_VALUE: 45
PIF_VALUE: 31
PIF_VALUE: 15
PIF_VALUE: 17
PIF_VALUE: 25
PIF_VALUE: 17
PIF_VALUE: 32
PIF_VALUE: 16
PIF_VALUE: 30
PIF_VALUE: 15

## 2025-01-21 ASSESSMENT — PAIN SCALES - GENERAL
PAINLEVEL_OUTOF10: 0

## 2025-01-22 ENCOUNTER — APPOINTMENT (OUTPATIENT)
Dept: GENERAL RADIOLOGY | Age: 75
DRG: 870 | End: 2025-01-22
Payer: MEDICARE

## 2025-01-22 LAB
AADO2: 126.3 MMHG
ALBUMIN SERPL-MCNC: 2.6 G/DL (ref 3.5–5.2)
ALP SERPL-CCNC: 96 U/L (ref 35–104)
ALT SERPL-CCNC: 30 U/L (ref 0–32)
ANION GAP SERPL CALCULATED.3IONS-SCNC: 10 MMOL/L (ref 7–16)
AST SERPL-CCNC: 28 U/L (ref 0–31)
B.E.: 1.6 MMOL/L (ref -3–3)
BASOPHILS # BLD: 0.01 K/UL (ref 0–0.2)
BASOPHILS NFR BLD: 0 % (ref 0–2)
BILIRUB SERPL-MCNC: 0.5 MG/DL (ref 0–1.2)
BUN SERPL-MCNC: 15 MG/DL (ref 6–23)
CALCIUM SERPL-MCNC: 7.8 MG/DL (ref 8.6–10.2)
CHLORIDE SERPL-SCNC: 109 MMOL/L (ref 98–107)
CO2 SERPL-SCNC: 24 MMOL/L (ref 22–29)
COHB: 0.6 % (ref 0–1.5)
CREAT SERPL-MCNC: 0.7 MG/DL (ref 0.5–1)
CRITICAL: ABNORMAL
DATE ANALYZED: ABNORMAL
DATE OF COLLECTION: ABNORMAL
EOSINOPHIL # BLD: 0.26 K/UL (ref 0.05–0.5)
EOSINOPHILS RELATIVE PERCENT: 3 % (ref 0–6)
ERYTHROCYTE [DISTWIDTH] IN BLOOD BY AUTOMATED COUNT: 15.9 % (ref 11.5–15)
FIO2: 40 %
GFR, ESTIMATED: >90 ML/MIN/1.73M2
GLUCOSE SERPL-MCNC: 99 MG/DL (ref 74–99)
HCO3: 24.6 MMOL/L (ref 22–26)
HCT VFR BLD AUTO: 25.8 % (ref 34–48)
HGB BLD-MCNC: 8.3 G/DL (ref 11.5–15.5)
HHB: 1.5 % (ref 0–5)
IMM GRANULOCYTES # BLD AUTO: 0.09 K/UL (ref 0–0.58)
IMM GRANULOCYTES NFR BLD: 1 % (ref 0–5)
INR PPP: 1.2
LAB: ABNORMAL
LYMPHOCYTES NFR BLD: 2.01 K/UL (ref 1.5–4)
LYMPHOCYTES RELATIVE PERCENT: 21 % (ref 20–42)
Lab: 452
MAGNESIUM SERPL-MCNC: 2.1 MG/DL (ref 1.6–2.6)
MCH RBC QN AUTO: 31.1 PG (ref 26–35)
MCHC RBC AUTO-ENTMCNC: 32.2 G/DL (ref 32–34.5)
MCV RBC AUTO: 96.6 FL (ref 80–99.9)
METHB: 0.4 % (ref 0–1.5)
MODE: AC
MONOCYTES NFR BLD: 0.57 K/UL (ref 0.1–0.95)
MONOCYTES NFR BLD: 6 % (ref 2–12)
NEUTROPHILS NFR BLD: 69 % (ref 43–80)
NEUTS SEG NFR BLD: 6.5 K/UL (ref 1.8–7.3)
O2 SATURATION: 98.5 % (ref 92–98.5)
O2HB: 97.5 % (ref 94–97)
OPERATOR ID: 2962
PARTIAL THROMBOPLASTIN TIME: 27 SEC (ref 24.5–35.1)
PATIENT TEMP: 37 C
PCO2: 32.7 MMHG (ref 35–45)
PEEP/CPAP: 8 CMH2O
PFO2: 3.03 MMHG/%
PH BLOOD GAS: 7.5 (ref 7.35–7.45)
PHOSPHATE SERPL-MCNC: 2.9 MG/DL (ref 2.5–4.5)
PLATELET # BLD AUTO: 116 K/UL (ref 130–450)
PMV BLD AUTO: 11.7 FL (ref 7–12)
PO2: 121.3 MMHG (ref 75–100)
POTASSIUM SERPL-SCNC: 3.7 MMOL/L (ref 3.5–5)
PROT SERPL-MCNC: 5.2 G/DL (ref 6.4–8.3)
PROTHROMBIN TIME: 12.7 SEC (ref 9.3–12.4)
RBC # BLD AUTO: 2.67 M/UL (ref 3.5–5.5)
RI(T): 1.04
RR MECHANICAL: 12 B/MIN
SODIUM SERPL-SCNC: 143 MMOL/L (ref 132–146)
SOURCE, BLOOD GAS: ABNORMAL
THB: 9.5 G/DL (ref 11.5–16.5)
TIME ANALYZED: 504
VT MECHANICAL: 450 ML
WBC OTHER # BLD: 9.4 K/UL (ref 4.5–11.5)

## 2025-01-22 PROCEDURE — 2000000000 HC ICU R&B

## 2025-01-22 PROCEDURE — 85730 THROMBOPLASTIN TIME PARTIAL: CPT

## 2025-01-22 PROCEDURE — 71045 X-RAY EXAM CHEST 1 VIEW: CPT

## 2025-01-22 PROCEDURE — 6360000002 HC RX W HCPCS

## 2025-01-22 PROCEDURE — 87070 CULTURE OTHR SPECIMN AEROBIC: CPT

## 2025-01-22 PROCEDURE — 6360000002 HC RX W HCPCS: Performed by: INTERNAL MEDICINE

## 2025-01-22 PROCEDURE — 83735 ASSAY OF MAGNESIUM: CPT

## 2025-01-22 PROCEDURE — 2500000003 HC RX 250 WO HCPCS

## 2025-01-22 PROCEDURE — 6360000002 HC RX W HCPCS: Performed by: NURSE PRACTITIONER

## 2025-01-22 PROCEDURE — 82805 BLOOD GASES W/O2 SATURATION: CPT

## 2025-01-22 PROCEDURE — 84100 ASSAY OF PHOSPHORUS: CPT

## 2025-01-22 PROCEDURE — 85025 COMPLETE CBC W/AUTO DIFF WBC: CPT

## 2025-01-22 PROCEDURE — 2580000003 HC RX 258: Performed by: INTERNAL MEDICINE

## 2025-01-22 PROCEDURE — 2580000003 HC RX 258

## 2025-01-22 PROCEDURE — 85610 PROTHROMBIN TIME: CPT

## 2025-01-22 PROCEDURE — 2580000003 HC RX 258: Performed by: NURSE PRACTITIONER

## 2025-01-22 PROCEDURE — 6370000000 HC RX 637 (ALT 250 FOR IP)

## 2025-01-22 PROCEDURE — 94640 AIRWAY INHALATION TREATMENT: CPT

## 2025-01-22 PROCEDURE — 87205 SMEAR GRAM STAIN: CPT

## 2025-01-22 PROCEDURE — 2500000003 HC RX 250 WO HCPCS: Performed by: NURSE PRACTITIONER

## 2025-01-22 PROCEDURE — P9047 ALBUMIN (HUMAN), 25%, 50ML: HCPCS | Performed by: INTERNAL MEDICINE

## 2025-01-22 PROCEDURE — 80053 COMPREHEN METABOLIC PANEL: CPT

## 2025-01-22 PROCEDURE — 36410 VNPNXR 3YR/> PHY/QHP DX/THER: CPT

## 2025-01-22 PROCEDURE — 99291 CRITICAL CARE FIRST HOUR: CPT | Performed by: INTERNAL MEDICINE

## 2025-01-22 PROCEDURE — 76937 US GUIDE VASCULAR ACCESS: CPT

## 2025-01-22 PROCEDURE — 87077 CULTURE AEROBIC IDENTIFY: CPT

## 2025-01-22 PROCEDURE — C1751 CATH, INF, PER/CENT/MIDLINE: HCPCS

## 2025-01-22 PROCEDURE — 94003 VENT MGMT INPAT SUBQ DAY: CPT

## 2025-01-22 RX ORDER — SODIUM CHLORIDE 0.9 % (FLUSH) 0.9 %
5-40 SYRINGE (ML) INJECTION EVERY 12 HOURS SCHEDULED
Status: DISCONTINUED | OUTPATIENT
Start: 2025-01-22 | End: 2025-01-22

## 2025-01-22 RX ORDER — HEPARIN 100 UNIT/ML
1 SYRINGE INTRAVENOUS PRN
Status: DISCONTINUED | OUTPATIENT
Start: 2025-01-22 | End: 2025-01-28 | Stop reason: HOSPADM

## 2025-01-22 RX ORDER — SODIUM CHLORIDE 9 MG/ML
INJECTION, SOLUTION INTRAVENOUS PRN
Status: DISCONTINUED | OUTPATIENT
Start: 2025-01-22 | End: 2025-01-28 | Stop reason: HOSPADM

## 2025-01-22 RX ORDER — ALBUMIN (HUMAN) 12.5 G/50ML
25 SOLUTION INTRAVENOUS EVERY 8 HOURS
Status: COMPLETED | OUTPATIENT
Start: 2025-01-22 | End: 2025-01-24

## 2025-01-22 RX ORDER — CALCIUM GLUCONATE 20 MG/ML
1000 INJECTION, SOLUTION INTRAVENOUS ONCE
Status: COMPLETED | OUTPATIENT
Start: 2025-01-22 | End: 2025-01-22

## 2025-01-22 RX ORDER — ACETYLCYSTEINE 200 MG/ML
600 SOLUTION ORAL; RESPIRATORY (INHALATION)
Status: DISCONTINUED | OUTPATIENT
Start: 2025-01-22 | End: 2025-01-28 | Stop reason: HOSPADM

## 2025-01-22 RX ORDER — HEPARIN 100 UNIT/ML
1 SYRINGE INTRAVENOUS EVERY 12 HOURS SCHEDULED
Status: DISCONTINUED | OUTPATIENT
Start: 2025-01-22 | End: 2025-01-28 | Stop reason: HOSPADM

## 2025-01-22 RX ORDER — SODIUM CHLORIDE 0.9 % (FLUSH) 0.9 %
5-40 SYRINGE (ML) INJECTION PRN
Status: DISCONTINUED | OUTPATIENT
Start: 2025-01-22 | End: 2025-01-22

## 2025-01-22 RX ORDER — BUMETANIDE 0.25 MG/ML
1 INJECTION, SOLUTION INTRAMUSCULAR; INTRAVENOUS 2 TIMES DAILY
Status: DISCONTINUED | OUTPATIENT
Start: 2025-01-22 | End: 2025-01-26

## 2025-01-22 RX ADMIN — Medication 15 ML: at 19:52

## 2025-01-22 RX ADMIN — IPRATROPIUM BROMIDE AND ALBUTEROL SULFATE 1 DOSE: 2.5; .5 SOLUTION RESPIRATORY (INHALATION) at 20:24

## 2025-01-22 RX ADMIN — Medication 15 ML: at 07:59

## 2025-01-22 RX ADMIN — SODIUM CHLORIDE, PRESERVATIVE FREE 20 MG: 5 INJECTION INTRAVENOUS at 19:52

## 2025-01-22 RX ADMIN — LEVOTHYROXINE SODIUM 25 MCG: 0.03 TABLET ORAL at 06:24

## 2025-01-22 RX ADMIN — PIPERACILLIN AND TAZOBACTAM 4500 MG: 4; .5 INJECTION, POWDER, LYOPHILIZED, FOR SOLUTION INTRAVENOUS at 09:02

## 2025-01-22 RX ADMIN — ACETYLCYSTEINE 600 MG: 200 INHALANT RESPIRATORY (INHALATION) at 20:24

## 2025-01-22 RX ADMIN — IPRATROPIUM BROMIDE AND ALBUTEROL SULFATE 1 DOSE: 2.5; .5 SOLUTION RESPIRATORY (INHALATION) at 12:27

## 2025-01-22 RX ADMIN — ALBUMIN (HUMAN) 25 G: 0.25 INJECTION, SOLUTION INTRAVENOUS at 10:05

## 2025-01-22 RX ADMIN — SODIUM CHLORIDE, PRESERVATIVE FREE 10 ML: 5 INJECTION INTRAVENOUS at 19:53

## 2025-01-22 RX ADMIN — GUAIFENESIN 200 MG: 100 LIQUID ORAL at 08:36

## 2025-01-22 RX ADMIN — GUAIFENESIN 200 MG: 100 LIQUID ORAL at 05:11

## 2025-01-22 RX ADMIN — PIPERACILLIN AND TAZOBACTAM 4500 MG: 4; .5 INJECTION, POWDER, LYOPHILIZED, FOR SOLUTION INTRAVENOUS at 02:18

## 2025-01-22 RX ADMIN — SODIUM CHLORIDE, PRESERVATIVE FREE 20 MG: 5 INJECTION INTRAVENOUS at 07:59

## 2025-01-22 RX ADMIN — GUAIFENESIN 200 MG: 100 LIQUID ORAL at 17:39

## 2025-01-22 RX ADMIN — Medication 100 UNITS: at 20:14

## 2025-01-22 RX ADMIN — Medication 100 UNITS: at 13:17

## 2025-01-22 RX ADMIN — IPRATROPIUM BROMIDE AND ALBUTEROL SULFATE 1 DOSE: 2.5; .5 SOLUTION RESPIRATORY (INHALATION) at 15:46

## 2025-01-22 RX ADMIN — ENOXAPARIN SODIUM 40 MG: 100 INJECTION SUBCUTANEOUS at 08:00

## 2025-01-22 RX ADMIN — POTASSIUM PHOSPHATE, MONOBASIC AND POTASSIUM PHOSPHATE, DIBASIC 15 MMOL: 224; 236 INJECTION, SOLUTION, CONCENTRATE INTRAVENOUS at 06:47

## 2025-01-22 RX ADMIN — BUMETANIDE 1 MG: 0.25 INJECTION INTRAMUSCULAR; INTRAVENOUS at 10:04

## 2025-01-22 RX ADMIN — CALCIUM GLUCONATE 1000 MG: 20 INJECTION, SOLUTION INTRAVENOUS at 06:24

## 2025-01-22 RX ADMIN — ACETAMINOPHEN 650 MG: 325 TABLET ORAL at 13:09

## 2025-01-22 RX ADMIN — IPRATROPIUM BROMIDE AND ALBUTEROL SULFATE 1 DOSE: 2.5; .5 SOLUTION RESPIRATORY (INHALATION) at 08:05

## 2025-01-22 RX ADMIN — ALBUMIN (HUMAN) 25 G: 0.25 INJECTION, SOLUTION INTRAVENOUS at 18:09

## 2025-01-22 RX ADMIN — ACETYLCYSTEINE 600 MG: 200 INHALANT RESPIRATORY (INHALATION) at 15:46

## 2025-01-22 RX ADMIN — SODIUM CHLORIDE, PRESERVATIVE FREE 10 ML: 5 INJECTION INTRAVENOUS at 08:00

## 2025-01-22 ASSESSMENT — PAIN SCALES - GENERAL
PAINLEVEL_OUTOF10: 0

## 2025-01-22 ASSESSMENT — PULMONARY FUNCTION TESTS
PIF_VALUE: 19
PIF_VALUE: 26
PIF_VALUE: 34
PIF_VALUE: 27
PIF_VALUE: 35
PIF_VALUE: 28
PIF_VALUE: 29
PIF_VALUE: 26
PIF_VALUE: 24
PIF_VALUE: 38
PIF_VALUE: 31
PIF_VALUE: 18
PIF_VALUE: 26
PIF_VALUE: 27
PIF_VALUE: 18
PIF_VALUE: 32
PIF_VALUE: 26
PIF_VALUE: 33
PIF_VALUE: 18
PIF_VALUE: 31
PIF_VALUE: 30
PIF_VALUE: 26
PIF_VALUE: 19
PIF_VALUE: 23
PIF_VALUE: 25

## 2025-01-23 ENCOUNTER — APPOINTMENT (OUTPATIENT)
Dept: GENERAL RADIOLOGY | Age: 75
DRG: 870 | End: 2025-01-23
Payer: MEDICARE

## 2025-01-23 LAB
AADO2: 149.3 MMHG
ALBUMIN SERPL-MCNC: 3.4 G/DL (ref 3.5–5.2)
ALP SERPL-CCNC: 82 U/L (ref 35–104)
ALT SERPL-CCNC: 19 U/L (ref 0–32)
ANION GAP SERPL CALCULATED.3IONS-SCNC: 10 MMOL/L (ref 7–16)
AST SERPL-CCNC: 18 U/L (ref 0–31)
B.E.: 2.7 MMOL/L (ref -3–3)
BASOPHILS # BLD: 0.01 K/UL (ref 0–0.2)
BASOPHILS NFR BLD: 0 % (ref 0–2)
BILIRUB SERPL-MCNC: 0.7 MG/DL (ref 0–1.2)
BUN SERPL-MCNC: 11 MG/DL (ref 6–23)
CALCIUM SERPL-MCNC: 8.1 MG/DL (ref 8.6–10.2)
CHLORIDE SERPL-SCNC: 107 MMOL/L (ref 98–107)
CO2 SERPL-SCNC: 27 MMOL/L (ref 22–29)
COHB: 0.5 % (ref 0–1.5)
CREAT SERPL-MCNC: 0.7 MG/DL (ref 0.5–1)
CRITICAL: ABNORMAL
DATE ANALYZED: ABNORMAL
DATE OF COLLECTION: ABNORMAL
EOSINOPHIL # BLD: 0.14 K/UL (ref 0.05–0.5)
EOSINOPHILS RELATIVE PERCENT: 2 % (ref 0–6)
ERYTHROCYTE [DISTWIDTH] IN BLOOD BY AUTOMATED COUNT: 15.8 % (ref 11.5–15)
FIO2: 40 %
GFR, ESTIMATED: >90 ML/MIN/1.73M2
GLUCOSE SERPL-MCNC: 119 MG/DL (ref 74–99)
HCO3: 25.7 MMOL/L (ref 22–26)
HCT VFR BLD AUTO: 23.4 % (ref 34–48)
HGB BLD-MCNC: 7.7 G/DL (ref 11.5–15.5)
HHB: 2.6 % (ref 0–5)
IMM GRANULOCYTES # BLD AUTO: 0.03 K/UL (ref 0–0.58)
IMM GRANULOCYTES NFR BLD: 0 % (ref 0–5)
INR PPP: 1.2
LAB: ABNORMAL
LYMPHOCYTES NFR BLD: 1.35 K/UL (ref 1.5–4)
LYMPHOCYTES RELATIVE PERCENT: 19 % (ref 20–42)
Lab: 450
MAGNESIUM SERPL-MCNC: 2.3 MG/DL (ref 1.6–2.6)
MCH RBC QN AUTO: 31.8 PG (ref 26–35)
MCHC RBC AUTO-ENTMCNC: 32.9 G/DL (ref 32–34.5)
MCV RBC AUTO: 96.7 FL (ref 80–99.9)
METHB: 0.5 % (ref 0–1.5)
MODE: AC
MONOCYTES NFR BLD: 0.35 K/UL (ref 0.1–0.95)
MONOCYTES NFR BLD: 5 % (ref 2–12)
NEUTROPHILS NFR BLD: 74 % (ref 43–80)
NEUTS SEG NFR BLD: 5.36 K/UL (ref 1.8–7.3)
O2 SATURATION: 97.4 % (ref 92–98.5)
O2HB: 96.4 % (ref 94–97)
OPERATOR ID: 3214
PARTIAL THROMBOPLASTIN TIME: 28.1 SEC (ref 24.5–35.1)
PATIENT TEMP: 37 C
PCO2: 32.7 MMHG (ref 35–45)
PEEP/CPAP: 8 CMH2O
PFO2: 2.46 MMHG/%
PH BLOOD GAS: 7.51 (ref 7.35–7.45)
PHOSPHATE SERPL-MCNC: 2.4 MG/DL (ref 2.5–4.5)
PLATELET, FLUORESCENCE: 115 K/UL (ref 130–450)
PMV BLD AUTO: 11.9 FL (ref 7–12)
PO2: 98.3 MMHG (ref 75–100)
POTASSIUM SERPL-SCNC: 3.5 MMOL/L (ref 3.5–5)
PROT SERPL-MCNC: 5.6 G/DL (ref 6.4–8.3)
PROTHROMBIN TIME: 13.5 SEC (ref 9.3–12.4)
RBC # BLD AUTO: 2.42 M/UL (ref 3.5–5.5)
RI(T): 1.52
RR MECHANICAL: 12 B/MIN
SODIUM SERPL-SCNC: 144 MMOL/L (ref 132–146)
SOURCE, BLOOD GAS: ABNORMAL
THB: 8.3 G/DL (ref 11.5–16.5)
TIME ANALYZED: 459
VT MECHANICAL: 450 ML
WBC OTHER # BLD: 7.2 K/UL (ref 4.5–11.5)

## 2025-01-23 PROCEDURE — 85610 PROTHROMBIN TIME: CPT

## 2025-01-23 PROCEDURE — 94667 MNPJ CHEST WALL 1ST: CPT

## 2025-01-23 PROCEDURE — 94640 AIRWAY INHALATION TREATMENT: CPT

## 2025-01-23 PROCEDURE — 83735 ASSAY OF MAGNESIUM: CPT

## 2025-01-23 PROCEDURE — 97530 THERAPEUTIC ACTIVITIES: CPT

## 2025-01-23 PROCEDURE — 82805 BLOOD GASES W/O2 SATURATION: CPT

## 2025-01-23 PROCEDURE — 85025 COMPLETE CBC W/AUTO DIFF WBC: CPT

## 2025-01-23 PROCEDURE — 2580000003 HC RX 258

## 2025-01-23 PROCEDURE — 94003 VENT MGMT INPAT SUBQ DAY: CPT

## 2025-01-23 PROCEDURE — 2000000000 HC ICU R&B

## 2025-01-23 PROCEDURE — 85730 THROMBOPLASTIN TIME PARTIAL: CPT

## 2025-01-23 PROCEDURE — 94669 MECHANICAL CHEST WALL OSCILL: CPT

## 2025-01-23 PROCEDURE — 6370000000 HC RX 637 (ALT 250 FOR IP)

## 2025-01-23 PROCEDURE — 71045 X-RAY EXAM CHEST 1 VIEW: CPT

## 2025-01-23 PROCEDURE — P9047 ALBUMIN (HUMAN), 25%, 50ML: HCPCS | Performed by: INTERNAL MEDICINE

## 2025-01-23 PROCEDURE — 2580000003 HC RX 258: Performed by: INTERNAL MEDICINE

## 2025-01-23 PROCEDURE — 80053 COMPREHEN METABOLIC PANEL: CPT

## 2025-01-23 PROCEDURE — 2500000003 HC RX 250 WO HCPCS

## 2025-01-23 PROCEDURE — 6360000002 HC RX W HCPCS: Performed by: INTERNAL MEDICINE

## 2025-01-23 PROCEDURE — 97535 SELF CARE MNGMENT TRAINING: CPT

## 2025-01-23 PROCEDURE — 94668 MNPJ CHEST WALL SBSQ: CPT

## 2025-01-23 PROCEDURE — 6360000002 HC RX W HCPCS

## 2025-01-23 PROCEDURE — 6370000000 HC RX 637 (ALT 250 FOR IP): Performed by: NURSE PRACTITIONER

## 2025-01-23 PROCEDURE — 84100 ASSAY OF PHOSPHORUS: CPT

## 2025-01-23 RX ORDER — QUETIAPINE FUMARATE 25 MG/1
25 TABLET, FILM COATED ORAL ONCE
Status: COMPLETED | OUTPATIENT
Start: 2025-01-23 | End: 2025-01-23

## 2025-01-23 RX ADMIN — SODIUM CHLORIDE, PRESERVATIVE FREE 20 MG: 5 INJECTION INTRAVENOUS at 08:06

## 2025-01-23 RX ADMIN — ALBUMIN (HUMAN) 25 G: 0.25 INJECTION, SOLUTION INTRAVENOUS at 17:09

## 2025-01-23 RX ADMIN — GUAIFENESIN 200 MG: 100 LIQUID ORAL at 16:22

## 2025-01-23 RX ADMIN — POTASSIUM CHLORIDE 10 MEQ: 7.46 INJECTION, SOLUTION INTRAVENOUS at 06:34

## 2025-01-23 RX ADMIN — ALBUMIN (HUMAN) 25 G: 0.25 INJECTION, SOLUTION INTRAVENOUS at 12:10

## 2025-01-23 RX ADMIN — SODIUM CHLORIDE, PRESERVATIVE FREE 10 ML: 5 INJECTION INTRAVENOUS at 20:01

## 2025-01-23 RX ADMIN — ACETYLCYSTEINE 600 MG: 200 INHALANT RESPIRATORY (INHALATION) at 20:43

## 2025-01-23 RX ADMIN — GUAIFENESIN 200 MG: 100 LIQUID ORAL at 08:06

## 2025-01-23 RX ADMIN — GUAIFENESIN 200 MG: 100 LIQUID ORAL at 20:03

## 2025-01-23 RX ADMIN — QUETIAPINE FUMARATE 25 MG: 25 TABLET ORAL at 22:15

## 2025-01-23 RX ADMIN — IPRATROPIUM BROMIDE AND ALBUTEROL SULFATE 1 DOSE: 2.5; .5 SOLUTION RESPIRATORY (INHALATION) at 13:49

## 2025-01-23 RX ADMIN — GUAIFENESIN 200 MG: 100 LIQUID ORAL at 00:00

## 2025-01-23 RX ADMIN — Medication 100 UNITS: at 08:06

## 2025-01-23 RX ADMIN — ACETYLCYSTEINE 600 MG: 200 INHALANT RESPIRATORY (INHALATION) at 13:49

## 2025-01-23 RX ADMIN — IPRATROPIUM BROMIDE AND ALBUTEROL SULFATE 1 DOSE: 2.5; .5 SOLUTION RESPIRATORY (INHALATION) at 10:19

## 2025-01-23 RX ADMIN — SODIUM CHLORIDE, PRESERVATIVE FREE 10 ML: 5 INJECTION INTRAVENOUS at 08:07

## 2025-01-23 RX ADMIN — GUAIFENESIN 200 MG: 100 LIQUID ORAL at 04:40

## 2025-01-23 RX ADMIN — DIBASIC SODIUM PHOSPHATE, MONOBASIC POTASSIUM PHOSPHATE AND MONOBASIC SODIUM PHOSPHATE 2 TABLET: 852; 155; 130 TABLET ORAL at 08:05

## 2025-01-23 RX ADMIN — DIBASIC SODIUM PHOSPHATE, MONOBASIC POTASSIUM PHOSPHATE AND MONOBASIC SODIUM PHOSPHATE 2 TABLET: 852; 155; 130 TABLET ORAL at 16:22

## 2025-01-23 RX ADMIN — DIBASIC SODIUM PHOSPHATE, MONOBASIC POTASSIUM PHOSPHATE AND MONOBASIC SODIUM PHOSPHATE 2 TABLET: 852; 155; 130 TABLET ORAL at 20:00

## 2025-01-23 RX ADMIN — ACETYLCYSTEINE 600 MG: 200 INHALANT RESPIRATORY (INHALATION) at 10:19

## 2025-01-23 RX ADMIN — BUMETANIDE 1 MG: 0.25 INJECTION INTRAMUSCULAR; INTRAVENOUS at 08:06

## 2025-01-23 RX ADMIN — MEROPENEM 1000 MG: 1 INJECTION INTRAVENOUS at 18:40

## 2025-01-23 RX ADMIN — IPRATROPIUM BROMIDE AND ALBUTEROL SULFATE 1 DOSE: 2.5; .5 SOLUTION RESPIRATORY (INHALATION) at 16:55

## 2025-01-23 RX ADMIN — Medication 15 ML: at 20:02

## 2025-01-23 RX ADMIN — ALBUMIN (HUMAN) 25 G: 0.25 INJECTION, SOLUTION INTRAVENOUS at 02:22

## 2025-01-23 RX ADMIN — BUMETANIDE 1 MG: 0.25 INJECTION INTRAMUSCULAR; INTRAVENOUS at 18:37

## 2025-01-23 RX ADMIN — ENOXAPARIN SODIUM 40 MG: 100 INJECTION SUBCUTANEOUS at 08:05

## 2025-01-23 RX ADMIN — POTASSIUM CHLORIDE 10 MEQ: 7.46 INJECTION, SOLUTION INTRAVENOUS at 08:09

## 2025-01-23 RX ADMIN — POTASSIUM CHLORIDE 10 MEQ: 7.46 INJECTION, SOLUTION INTRAVENOUS at 09:42

## 2025-01-23 RX ADMIN — Medication 15 ML: at 08:06

## 2025-01-23 RX ADMIN — SODIUM CHLORIDE, PRESERVATIVE FREE 20 MG: 5 INJECTION INTRAVENOUS at 20:02

## 2025-01-23 RX ADMIN — MEROPENEM 1000 MG: 1 INJECTION INTRAVENOUS at 13:17

## 2025-01-23 RX ADMIN — POTASSIUM CHLORIDE 10 MEQ: 7.46 INJECTION, SOLUTION INTRAVENOUS at 11:06

## 2025-01-23 RX ADMIN — LEVOTHYROXINE SODIUM 25 MCG: 0.03 TABLET ORAL at 06:35

## 2025-01-23 RX ADMIN — IPRATROPIUM BROMIDE AND ALBUTEROL SULFATE 1 DOSE: 2.5; .5 SOLUTION RESPIRATORY (INHALATION) at 20:43

## 2025-01-23 ASSESSMENT — PULMONARY FUNCTION TESTS
PIF_VALUE: 43
PIF_VALUE: 41
PIF_VALUE: 32
PIF_VALUE: 38
PIF_VALUE: 32
PIF_VALUE: 38
PIF_VALUE: 18
PIF_VALUE: 40
PIF_VALUE: 30
PIF_VALUE: 19
PIF_VALUE: 28
PIF_VALUE: 38
PIF_VALUE: 28
PIF_VALUE: 31
PIF_VALUE: 38
PIF_VALUE: 33
PIF_VALUE: 31
PIF_VALUE: 30
PIF_VALUE: 37
PIF_VALUE: 18
PIF_VALUE: 27
PIF_VALUE: 36
PIF_VALUE: 33
PIF_VALUE: 25
PIF_VALUE: 18
PIF_VALUE: 31
PIF_VALUE: 19
PIF_VALUE: 32
PIF_VALUE: 33
PIF_VALUE: 34

## 2025-01-23 ASSESSMENT — PAIN SCALES - GENERAL
PAINLEVEL_OUTOF10: 0

## 2025-01-23 NOTE — CARE COORDINATION
Care Coordination: LOS 10 day.  Tentative meeting with Palliative med set up for today at 10 am.  Yumiko, oneil, ogt/tf.  Pt was LTC at [facility], will continue to follow for transition of care needs    Electronically signed by Stefania Lugo RN on 1/23/2025 at 9:35 AM

## 2025-01-24 ENCOUNTER — APPOINTMENT (OUTPATIENT)
Dept: GENERAL RADIOLOGY | Age: 75
DRG: 870 | End: 2025-01-24
Payer: MEDICARE

## 2025-01-24 LAB
AADO2: 128.7 MMHG
ALBUMIN SERPL-MCNC: 3.7 G/DL (ref 3.5–5.2)
ALP SERPL-CCNC: 73 U/L (ref 35–104)
ALT SERPL-CCNC: 14 U/L (ref 0–32)
ANION GAP SERPL CALCULATED.3IONS-SCNC: 11 MMOL/L (ref 7–16)
AST SERPL-CCNC: 15 U/L (ref 0–31)
B.E.: 3 MMOL/L (ref -3–3)
BASOPHILS # BLD: 0.01 K/UL (ref 0–0.2)
BASOPHILS NFR BLD: 0 % (ref 0–2)
BILIRUB SERPL-MCNC: 0.8 MG/DL (ref 0–1.2)
BUN SERPL-MCNC: 9 MG/DL (ref 6–23)
CALCIUM SERPL-MCNC: 8.4 MG/DL (ref 8.6–10.2)
CHLORIDE SERPL-SCNC: 107 MMOL/L (ref 98–107)
CO2 SERPL-SCNC: 25 MMOL/L (ref 22–29)
COHB: 0.4 % (ref 0–1.5)
CREAT SERPL-MCNC: 0.7 MG/DL (ref 0.5–1)
CRITICAL: ABNORMAL
DATE ANALYZED: ABNORMAL
DATE OF COLLECTION: ABNORMAL
EOSINOPHIL # BLD: 0.13 K/UL (ref 0.05–0.5)
EOSINOPHILS RELATIVE PERCENT: 2 % (ref 0–6)
ERYTHROCYTE [DISTWIDTH] IN BLOOD BY AUTOMATED COUNT: 16 % (ref 11.5–15)
FIO2: 40 %
GFR, ESTIMATED: >90 ML/MIN/1.73M2
GLUCOSE SERPL-MCNC: 132 MG/DL (ref 74–99)
HCO3: 25.8 MMOL/L (ref 22–26)
HCT VFR BLD AUTO: 22.9 % (ref 34–48)
HGB BLD-MCNC: 7.3 G/DL (ref 11.5–15.5)
HHB: 1.6 % (ref 0–5)
IMM GRANULOCYTES # BLD AUTO: 0.03 K/UL (ref 0–0.58)
IMM GRANULOCYTES NFR BLD: 1 % (ref 0–5)
INR PPP: 1.3
LAB: ABNORMAL
LYMPHOCYTES NFR BLD: 1.17 K/UL (ref 1.5–4)
LYMPHOCYTES RELATIVE PERCENT: 18 % (ref 20–42)
Lab: 410
MAGNESIUM SERPL-MCNC: 1.9 MG/DL (ref 1.6–2.6)
MCH RBC QN AUTO: 31.5 PG (ref 26–35)
MCHC RBC AUTO-ENTMCNC: 31.9 G/DL (ref 32–34.5)
MCV RBC AUTO: 98.7 FL (ref 80–99.9)
METHB: 0.6 % (ref 0–1.5)
MICROORGANISM SPEC CULT: ABNORMAL
MICROORGANISM SPEC CULT: ABNORMAL
MICROORGANISM/AGENT SPEC: ABNORMAL
MODE: AC
MONOCYTES NFR BLD: 0.34 K/UL (ref 0.1–0.95)
MONOCYTES NFR BLD: 5 % (ref 2–12)
NEUTROPHILS NFR BLD: 74 % (ref 43–80)
NEUTS SEG NFR BLD: 4.75 K/UL (ref 1.8–7.3)
O2 SATURATION: 98.4 % (ref 92–98.5)
O2HB: 97.4 % (ref 94–97)
OPERATOR ID: 2593
PARTIAL THROMBOPLASTIN TIME: 28.7 SEC (ref 24.5–35.1)
PATIENT TEMP: 37 C
PCO2: 32 MMHG (ref 35–45)
PEEP/CPAP: 8 CMH2O
PFO2: 2.99 MMHG/%
PH BLOOD GAS: 7.53 (ref 7.35–7.45)
PHOSPHATE SERPL-MCNC: 2.7 MG/DL (ref 2.5–4.5)
PLATELET # BLD AUTO: 116 K/UL (ref 130–450)
PMV BLD AUTO: 12.1 FL (ref 7–12)
PO2: 119.7 MMHG (ref 75–100)
POTASSIUM SERPL-SCNC: 3.1 MMOL/L (ref 3.5–5)
PROT SERPL-MCNC: 5.7 G/DL (ref 6.4–8.3)
PROTHROMBIN TIME: 14.2 SEC (ref 9.3–12.4)
RBC # BLD AUTO: 2.32 M/UL (ref 3.5–5.5)
RI(T): 1.08
RR MECHANICAL: 12 B/MIN
SODIUM SERPL-SCNC: 143 MMOL/L (ref 132–146)
SOURCE, BLOOD GAS: ABNORMAL
SPECIMEN DESCRIPTION: ABNORMAL
THB: 7.9 G/DL (ref 11.5–16.5)
TIME ANALYZED: 426
VT MECHANICAL: 450 ML
WBC OTHER # BLD: 6.4 K/UL (ref 4.5–11.5)

## 2025-01-24 PROCEDURE — 85025 COMPLETE CBC W/AUTO DIFF WBC: CPT

## 2025-01-24 PROCEDURE — 85730 THROMBOPLASTIN TIME PARTIAL: CPT

## 2025-01-24 PROCEDURE — 80053 COMPREHEN METABOLIC PANEL: CPT

## 2025-01-24 PROCEDURE — 2500000003 HC RX 250 WO HCPCS

## 2025-01-24 PROCEDURE — 2000000000 HC ICU R&B

## 2025-01-24 PROCEDURE — 94003 VENT MGMT INPAT SUBQ DAY: CPT

## 2025-01-24 PROCEDURE — 94669 MECHANICAL CHEST WALL OSCILL: CPT

## 2025-01-24 PROCEDURE — 2580000003 HC RX 258

## 2025-01-24 PROCEDURE — 85610 PROTHROMBIN TIME: CPT

## 2025-01-24 PROCEDURE — 6370000000 HC RX 637 (ALT 250 FOR IP)

## 2025-01-24 PROCEDURE — 6360000002 HC RX W HCPCS: Performed by: INTERNAL MEDICINE

## 2025-01-24 PROCEDURE — P9047 ALBUMIN (HUMAN), 25%, 50ML: HCPCS | Performed by: INTERNAL MEDICINE

## 2025-01-24 PROCEDURE — 6370000000 HC RX 637 (ALT 250 FOR IP): Performed by: NURSE PRACTITIONER

## 2025-01-24 PROCEDURE — 94640 AIRWAY INHALATION TREATMENT: CPT

## 2025-01-24 PROCEDURE — 82805 BLOOD GASES W/O2 SATURATION: CPT

## 2025-01-24 PROCEDURE — 6360000002 HC RX W HCPCS

## 2025-01-24 PROCEDURE — 71045 X-RAY EXAM CHEST 1 VIEW: CPT

## 2025-01-24 PROCEDURE — 2580000003 HC RX 258: Performed by: INTERNAL MEDICINE

## 2025-01-24 PROCEDURE — 83735 ASSAY OF MAGNESIUM: CPT

## 2025-01-24 PROCEDURE — 84100 ASSAY OF PHOSPHORUS: CPT

## 2025-01-24 RX ADMIN — IPRATROPIUM BROMIDE AND ALBUTEROL SULFATE 1 DOSE: 2.5; .5 SOLUTION RESPIRATORY (INHALATION) at 16:00

## 2025-01-24 RX ADMIN — SODIUM CHLORIDE, PRESERVATIVE FREE 20 MG: 5 INJECTION INTRAVENOUS at 20:08

## 2025-01-24 RX ADMIN — GUAIFENESIN 200 MG: 100 LIQUID ORAL at 18:09

## 2025-01-24 RX ADMIN — Medication 15 ML: at 08:18

## 2025-01-24 RX ADMIN — MEROPENEM 1000 MG: 1 INJECTION INTRAVENOUS at 02:18

## 2025-01-24 RX ADMIN — MEROPENEM 1000 MG: 1 INJECTION INTRAVENOUS at 18:11

## 2025-01-24 RX ADMIN — ACETYLCYSTEINE 600 MG: 200 INHALANT RESPIRATORY (INHALATION) at 12:53

## 2025-01-24 RX ADMIN — BUMETANIDE 1 MG: 0.25 INJECTION INTRAMUSCULAR; INTRAVENOUS at 18:10

## 2025-01-24 RX ADMIN — Medication 100 UNITS: at 08:19

## 2025-01-24 RX ADMIN — BUMETANIDE 1 MG: 0.25 INJECTION INTRAMUSCULAR; INTRAVENOUS at 08:18

## 2025-01-24 RX ADMIN — IPRATROPIUM BROMIDE AND ALBUTEROL SULFATE 1 DOSE: 2.5; .5 SOLUTION RESPIRATORY (INHALATION) at 20:28

## 2025-01-24 RX ADMIN — IPRATROPIUM BROMIDE AND ALBUTEROL SULFATE 1 DOSE: 2.5; .5 SOLUTION RESPIRATORY (INHALATION) at 12:53

## 2025-01-24 RX ADMIN — SODIUM CHLORIDE, PRESERVATIVE FREE 10 ML: 5 INJECTION INTRAVENOUS at 08:18

## 2025-01-24 RX ADMIN — ALBUMIN (HUMAN) 25 G: 0.25 INJECTION, SOLUTION INTRAVENOUS at 02:15

## 2025-01-24 RX ADMIN — SODIUM CHLORIDE, PRESERVATIVE FREE 20 MG: 5 INJECTION INTRAVENOUS at 08:18

## 2025-01-24 RX ADMIN — SODIUM CHLORIDE, PRESERVATIVE FREE 10 ML: 5 INJECTION INTRAVENOUS at 20:08

## 2025-01-24 RX ADMIN — LEVOTHYROXINE SODIUM 25 MCG: 0.03 TABLET ORAL at 05:59

## 2025-01-24 RX ADMIN — GUAIFENESIN 200 MG: 100 LIQUID ORAL at 08:18

## 2025-01-24 RX ADMIN — ACETYLCYSTEINE 600 MG: 200 INHALANT RESPIRATORY (INHALATION) at 20:28

## 2025-01-24 RX ADMIN — MEROPENEM 1000 MG: 1 INJECTION INTRAVENOUS at 10:55

## 2025-01-24 RX ADMIN — GUAIFENESIN 200 MG: 100 LIQUID ORAL at 20:07

## 2025-01-24 RX ADMIN — POTASSIUM BICARBONATE 50 MEQ: 782 TABLET, EFFERVESCENT ORAL at 05:59

## 2025-01-24 RX ADMIN — GUAIFENESIN 200 MG: 100 LIQUID ORAL at 05:54

## 2025-01-24 RX ADMIN — Medication 15 ML: at 20:07

## 2025-01-24 RX ADMIN — ENOXAPARIN SODIUM 40 MG: 100 INJECTION SUBCUTANEOUS at 08:18

## 2025-01-24 ASSESSMENT — PULMONARY FUNCTION TESTS
PIF_VALUE: 24
PIF_VALUE: 29
PIF_VALUE: 26
PIF_VALUE: 22
PIF_VALUE: 31
PIF_VALUE: 20
PIF_VALUE: 36
PIF_VALUE: 32
PIF_VALUE: 19
PIF_VALUE: 39
PIF_VALUE: 19
PIF_VALUE: 30
PIF_VALUE: 34
PIF_VALUE: 24
PIF_VALUE: 32
PIF_VALUE: 28
PIF_VALUE: 41
PIF_VALUE: 18
PIF_VALUE: 28
PIF_VALUE: 26
PIF_VALUE: 38
PIF_VALUE: 24
PIF_VALUE: 31
PIF_VALUE: 29
PIF_VALUE: 28
PIF_VALUE: 24
PIF_VALUE: 25

## 2025-01-24 ASSESSMENT — PAIN SCALES - GENERAL
PAINLEVEL_OUTOF10: 0

## 2025-01-25 ENCOUNTER — APPOINTMENT (OUTPATIENT)
Dept: GENERAL RADIOLOGY | Age: 75
DRG: 870 | End: 2025-01-25
Payer: MEDICARE

## 2025-01-25 LAB
AADO2: 144.3 MMHG
ALBUMIN SERPL-MCNC: 3.5 G/DL (ref 3.5–5.2)
ALP SERPL-CCNC: 83 U/L (ref 35–104)
ALT SERPL-CCNC: 13 U/L (ref 0–32)
ANION GAP SERPL CALCULATED.3IONS-SCNC: 11 MMOL/L (ref 7–16)
AST SERPL-CCNC: 18 U/L (ref 0–31)
B.E.: 3.4 MMOL/L (ref -3–3)
BASOPHILS # BLD: 0.01 K/UL (ref 0–0.2)
BASOPHILS NFR BLD: 0 % (ref 0–2)
BILIRUB SERPL-MCNC: 0.6 MG/DL (ref 0–1.2)
BUN SERPL-MCNC: 11 MG/DL (ref 6–23)
CALCIUM SERPL-MCNC: 8.2 MG/DL (ref 8.6–10.2)
CHLORIDE SERPL-SCNC: 108 MMOL/L (ref 98–107)
CO2 SERPL-SCNC: 26 MMOL/L (ref 22–29)
COHB: 0.3 % (ref 0–1.5)
CREAT SERPL-MCNC: 0.7 MG/DL (ref 0.5–1)
CRITICAL: ABNORMAL
DATE ANALYZED: ABNORMAL
DATE OF COLLECTION: ABNORMAL
EOSINOPHIL # BLD: 0.15 K/UL (ref 0.05–0.5)
EOSINOPHILS RELATIVE PERCENT: 2 % (ref 0–6)
ERYTHROCYTE [DISTWIDTH] IN BLOOD BY AUTOMATED COUNT: 15.9 % (ref 11.5–15)
FIO2: 40 %
GFR, ESTIMATED: >90 ML/MIN/1.73M2
GLUCOSE SERPL-MCNC: 115 MG/DL (ref 74–99)
HCO3: 26.3 MMOL/L (ref 22–26)
HCT VFR BLD AUTO: 23.6 % (ref 34–48)
HGB BLD-MCNC: 7.6 G/DL (ref 11.5–15.5)
HHB: 2.2 % (ref 0–5)
IMM GRANULOCYTES # BLD AUTO: 0.03 K/UL (ref 0–0.58)
IMM GRANULOCYTES NFR BLD: 0 % (ref 0–5)
INR PPP: 1.2
LAB: ABNORMAL
LYMPHOCYTES NFR BLD: 1.35 K/UL (ref 1.5–4)
LYMPHOCYTES RELATIVE PERCENT: 17 % (ref 20–42)
Lab: 425
MAGNESIUM SERPL-MCNC: 1.9 MG/DL (ref 1.6–2.6)
MCH RBC QN AUTO: 30.9 PG (ref 26–35)
MCHC RBC AUTO-ENTMCNC: 32.2 G/DL (ref 32–34.5)
MCV RBC AUTO: 95.9 FL (ref 80–99.9)
METHB: 0.6 % (ref 0–1.5)
MODE: AC
MONOCYTES NFR BLD: 0.41 K/UL (ref 0.1–0.95)
MONOCYTES NFR BLD: 5 % (ref 2–12)
NEUTROPHILS NFR BLD: 75 % (ref 43–80)
NEUTS SEG NFR BLD: 5.79 K/UL (ref 1.8–7.3)
O2 SATURATION: 97.8 % (ref 92–98.5)
O2HB: 96.9 % (ref 94–97)
OPERATOR ID: 1893
PARTIAL THROMBOPLASTIN TIME: 28.7 SEC (ref 24.5–35.1)
PATIENT TEMP: 37 C
PCO2: 33 MMHG (ref 35–45)
PEEP/CPAP: 8 CMH2O
PFO2: 2.58 MMHG/%
PH BLOOD GAS: 7.52 (ref 7.35–7.45)
PHOSPHATE SERPL-MCNC: 1.6 MG/DL (ref 2.5–4.5)
PLATELET # BLD AUTO: 146 K/UL (ref 130–450)
PMV BLD AUTO: 11.9 FL (ref 7–12)
PO2: 103 MMHG (ref 75–100)
POTASSIUM SERPL-SCNC: 3.4 MMOL/L (ref 3.5–5)
PROT SERPL-MCNC: 5.6 G/DL (ref 6.4–8.3)
PROTHROMBIN TIME: 12.9 SEC (ref 9.3–12.4)
RBC # BLD AUTO: 2.46 M/UL (ref 3.5–5.5)
RI(T): 1.4
RR MECHANICAL: 12 B/MIN
SODIUM SERPL-SCNC: 145 MMOL/L (ref 132–146)
SOURCE, BLOOD GAS: ABNORMAL
THB: 8.6 G/DL (ref 11.5–16.5)
TIME ANALYZED: 442
VT MECHANICAL: 450 ML
WBC OTHER # BLD: 7.7 K/UL (ref 4.5–11.5)

## 2025-01-25 PROCEDURE — 6370000000 HC RX 637 (ALT 250 FOR IP): Performed by: INTERNAL MEDICINE

## 2025-01-25 PROCEDURE — 2500000003 HC RX 250 WO HCPCS

## 2025-01-25 PROCEDURE — 2580000003 HC RX 258: Performed by: INTERNAL MEDICINE

## 2025-01-25 PROCEDURE — 94640 AIRWAY INHALATION TREATMENT: CPT

## 2025-01-25 PROCEDURE — 6370000000 HC RX 637 (ALT 250 FOR IP)

## 2025-01-25 PROCEDURE — 84100 ASSAY OF PHOSPHORUS: CPT

## 2025-01-25 PROCEDURE — 82805 BLOOD GASES W/O2 SATURATION: CPT

## 2025-01-25 PROCEDURE — 85025 COMPLETE CBC W/AUTO DIFF WBC: CPT

## 2025-01-25 PROCEDURE — 6360000002 HC RX W HCPCS: Performed by: NURSE PRACTITIONER

## 2025-01-25 PROCEDURE — 2000000000 HC ICU R&B

## 2025-01-25 PROCEDURE — 2500000003 HC RX 250 WO HCPCS: Performed by: NURSE PRACTITIONER

## 2025-01-25 PROCEDURE — 2580000003 HC RX 258

## 2025-01-25 PROCEDURE — 83735 ASSAY OF MAGNESIUM: CPT

## 2025-01-25 PROCEDURE — 94669 MECHANICAL CHEST WALL OSCILL: CPT

## 2025-01-25 PROCEDURE — 71045 X-RAY EXAM CHEST 1 VIEW: CPT

## 2025-01-25 PROCEDURE — 85610 PROTHROMBIN TIME: CPT

## 2025-01-25 PROCEDURE — 6360000002 HC RX W HCPCS

## 2025-01-25 PROCEDURE — 2580000003 HC RX 258: Performed by: NURSE PRACTITIONER

## 2025-01-25 PROCEDURE — 6360000002 HC RX W HCPCS: Performed by: INTERNAL MEDICINE

## 2025-01-25 PROCEDURE — 80053 COMPREHEN METABOLIC PANEL: CPT

## 2025-01-25 PROCEDURE — 94003 VENT MGMT INPAT SUBQ DAY: CPT

## 2025-01-25 PROCEDURE — 85730 THROMBOPLASTIN TIME PARTIAL: CPT

## 2025-01-25 RX ORDER — MAGNESIUM SULFATE 1 G/100ML
1000 INJECTION INTRAVENOUS ONCE
Status: COMPLETED | OUTPATIENT
Start: 2025-01-25 | End: 2025-01-25

## 2025-01-25 RX ADMIN — IPRATROPIUM BROMIDE AND ALBUTEROL SULFATE 1 DOSE: 2.5; .5 SOLUTION RESPIRATORY (INHALATION) at 21:26

## 2025-01-25 RX ADMIN — ACETYLCYSTEINE 600 MG: 200 INHALANT RESPIRATORY (INHALATION) at 12:42

## 2025-01-25 RX ADMIN — MAGNESIUM SULFATE HEPTAHYDRATE 1000 MG: 1 INJECTION, SOLUTION INTRAVENOUS at 06:15

## 2025-01-25 RX ADMIN — MEROPENEM 1000 MG: 1 INJECTION INTRAVENOUS at 14:02

## 2025-01-25 RX ADMIN — MICONAZOLE NITRATE: 20 OINTMENT TOPICAL at 21:17

## 2025-01-25 RX ADMIN — IPRATROPIUM BROMIDE AND ALBUTEROL SULFATE 1 DOSE: 2.5; .5 SOLUTION RESPIRATORY (INHALATION) at 16:03

## 2025-01-25 RX ADMIN — SODIUM CHLORIDE, PRESERVATIVE FREE 10 ML: 5 INJECTION INTRAVENOUS at 08:31

## 2025-01-25 RX ADMIN — LEVOTHYROXINE SODIUM 25 MCG: 0.03 TABLET ORAL at 06:00

## 2025-01-25 RX ADMIN — SODIUM CHLORIDE, PRESERVATIVE FREE 20 MG: 5 INJECTION INTRAVENOUS at 08:31

## 2025-01-25 RX ADMIN — Medication 100 UNITS: at 21:17

## 2025-01-25 RX ADMIN — POTASSIUM CHLORIDE 10 MEQ: 7.46 INJECTION, SOLUTION INTRAVENOUS at 07:30

## 2025-01-25 RX ADMIN — IPRATROPIUM BROMIDE AND ALBUTEROL SULFATE 1 DOSE: 2.5; .5 SOLUTION RESPIRATORY (INHALATION) at 12:42

## 2025-01-25 RX ADMIN — GUAIFENESIN 200 MG: 100 LIQUID ORAL at 04:17

## 2025-01-25 RX ADMIN — SODIUM PHOSPHATE, MONOBASIC, MONOHYDRATE AND SODIUM PHOSPHATE, DIBASIC, ANHYDROUS 20 MMOL: 142; 276 INJECTION, SOLUTION INTRAVENOUS at 06:24

## 2025-01-25 RX ADMIN — MEROPENEM 1000 MG: 1 INJECTION INTRAVENOUS at 18:57

## 2025-01-25 RX ADMIN — GUAIFENESIN 200 MG: 100 LIQUID ORAL at 18:58

## 2025-01-25 RX ADMIN — BUMETANIDE 1 MG: 0.25 INJECTION INTRAMUSCULAR; INTRAVENOUS at 08:31

## 2025-01-25 RX ADMIN — ACETYLCYSTEINE 600 MG: 200 INHALANT RESPIRATORY (INHALATION) at 21:26

## 2025-01-25 RX ADMIN — MEROPENEM 1000 MG: 1 INJECTION INTRAVENOUS at 02:07

## 2025-01-25 RX ADMIN — ACETYLCYSTEINE 600 MG: 200 INHALANT RESPIRATORY (INHALATION) at 08:38

## 2025-01-25 RX ADMIN — IPRATROPIUM BROMIDE AND ALBUTEROL SULFATE 1 DOSE: 2.5; .5 SOLUTION RESPIRATORY (INHALATION) at 08:37

## 2025-01-25 RX ADMIN — GUAIFENESIN 200 MG: 100 LIQUID ORAL at 14:02

## 2025-01-25 RX ADMIN — Medication 15 ML: at 08:31

## 2025-01-25 RX ADMIN — POTASSIUM CHLORIDE 10 MEQ: 7.46 INJECTION, SOLUTION INTRAVENOUS at 06:13

## 2025-01-25 RX ADMIN — SODIUM CHLORIDE, PRESERVATIVE FREE 20 MG: 5 INJECTION INTRAVENOUS at 21:16

## 2025-01-25 RX ADMIN — GUAIFENESIN 200 MG: 100 LIQUID ORAL at 23:31

## 2025-01-25 RX ADMIN — ENOXAPARIN SODIUM 40 MG: 100 INJECTION SUBCUTANEOUS at 08:31

## 2025-01-25 RX ADMIN — Medication 15 ML: at 21:17

## 2025-01-25 RX ADMIN — POTASSIUM CHLORIDE 10 MEQ: 7.46 INJECTION, SOLUTION INTRAVENOUS at 08:30

## 2025-01-25 ASSESSMENT — PAIN SCALES - GENERAL
PAINLEVEL_OUTOF10: 0

## 2025-01-25 ASSESSMENT — PULMONARY FUNCTION TESTS
PIF_VALUE: 28
PIF_VALUE: 32
PIF_VALUE: 27
PIF_VALUE: 27
PIF_VALUE: 38
PIF_VALUE: 28
PIF_VALUE: 25
PIF_VALUE: 25
PIF_VALUE: 31
PIF_VALUE: 18
PIF_VALUE: 27
PIF_VALUE: 26
PIF_VALUE: 28
PIF_VALUE: 25
PIF_VALUE: 33
PIF_VALUE: 32
PIF_VALUE: 26
PIF_VALUE: 32
PIF_VALUE: 27
PIF_VALUE: 32
PIF_VALUE: 30
PIF_VALUE: 27
PIF_VALUE: 28
PIF_VALUE: 27
PIF_VALUE: 37
PIF_VALUE: 28
PIF_VALUE: 18
PIF_VALUE: 24
PIF_VALUE: 27

## 2025-01-25 NOTE — CONSULTS
INPATIENT CARDIOLOGY CONSULT     Reason for Consult: Status post cardiac arrest, bradycardia and shock    Cardiologist: Dr. Mckenna    Requesting Physician:  Dr. Jimenez    Date of Consultation: 1/14/2025    HISTORY OF PRESENT ILLNESS:   Jimena Rodriguez is a 74-year-old female who is new to [facility] cardiology physicians.  See past medical history below.    Please note the following information was obtained from the patient's  who is currently at the bedside.  Patient is intubated and has a history of dementia.    Patient's  reported that she has been residing at [facility] since 11/2024 due to her worsening dementia.  When she went into the facility she was able to walk and now she is mostly chair bound.  Patient's  visits her daily from 9 AM to 5 PM and stated that she had been doing fairly well.  He stated that he was sitting down with her eating dinner when she suddenly choked \"on the long piece of bread.\"  She began to cough but the patient took a second bite.  Patient's  reported that she began to choke more and became unresponsive with her eyes rolling in the back of her head.  EMS was summoned and she was found to be bradycardic, paced transcutaneously and intubated.  She presented to [facility] on 1/13/2025.  While in the emergency department, endotracheal tube was exchanged which at that time food debris was suctioned from the airway.  Patient became bradycardic without pulses, received 1 round of ACLS with 1 dose of epinephrine.  ROSC was obtained and patient remained hypotensive and was initiated on Levophed IV drip.  Patient was found to have aspiration pneumonia in all lobes, worse on the right on CTA and patient was started on IV antibiotics.    Upon arrival to the ED: Blood pressure 74/45, heart rate 41, temperature 93.7 °F, intubated prior to admission. Labs: Sodium 141, potassium 4.6, BUN 26, creatinine 0.8, lactic acid 4.7, procalcitonin 1.09, 
  Palliative Care Department  [phone]  Palliative Care Initial Consult  Provider CHAY Villalpando CNP    Jimena Rodriguez  [id number]  Hospital Day: 8  Date of Initial Consult: 1/19/2025  Referring Provider: MADELEINE Gray  Palliative Medicine was consulted for assistance with: Goals of care    HPI:   Jimena Rodriguez is a 74 y.o. with a medical history of hypertension who was admitted on 1/13/2025 from home with a CHIEF COMPLAINT of altered mental status, left-sided weakness/seizure activity and choking episode.  Patient was intubated by EMS prior to arrival.  CXR concerning for interstitial opacity concerning for aspiration.  Patient was started on Levophed and admitted to ICU for further medical management.  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  1/14 s/p bronch.  1/16 tube feeds started.   ASSESSMENT/PLAN:     Pertinent Hospital Diagnoses     Septic shock  Acute hypoxic hypercapnic respiratory failure  COVID-19  Aspiration pneumonia  Dementia    Palliative Care Encounter / Counseling Regarding Goals of Care  Please see detailed goals of care discussion as below  At this time, Jimena Rodriguez, Does Not have capacity for medical decision-making.  Capacity is time limited and situation/question specific  During encounter Jorge Alberto was surrogate medical decision-maker  Outcome of goals of care meeting:   Confirmed full code  Code status Full Code  Advanced Directives: no POA or living will in epic  Surrogate/Legal NOK:  Jorge Alberto Rodriguez (spouse) [phone]    Spiritual assessment: no spiritual distress identified  Bereavement and grief: to be determined  Referrals to: none today  SUBJECTIVE:     Current medical issues leading to Palliative Medicine involvement include   Active Hospital Problems    Diagnosis Date Noted    Shock [R57.9] 01/14/2025    Cardiac arrest [I46.9] 01/14/2025 
Comprehensive Nutrition Assessment    Type and Reason for Visit:  Initial, Consult    Nutrition Recommendations/Plan:   Continue NPO, Continue Current Tube Feeding   add 1 protein modular daily to meet 100% estimated nutrient needs    Pt is at risk for refeeding syndrome. Monitor electrolytes/replace prn   Noted K/Phos low     Malnutrition Assessment:  Malnutrition Status:  At risk for malnutrition (01/16/25 1131)    Context:  Acute Illness     Findings of the 6 clinical characteristics of malnutrition:  Energy Intake:  Mild decrease in energy intake  Weight Loss:  Unable to assess (CBW appears elevated)     Body Fat Loss:  Unable to assess     Muscle Mass Loss:  Unable to assess    Fluid Accumulation:  No fluid accumulation     Strength:  Not Performed    Nutrition Assessment:    Pt admit from ECF d/t seizure while eating/choking s/p intubated. Noted septic shock, aspiration PNA. PMHx dementia, alzheimers, HTN. Pt s/p bronch 1/14. EN support initiated. Will provide updated TF recs and continue to monitor.    Nutrition Related Findings:    pt intubated, OGT w/ TF, MAP WNL off pressor, hypoactive BS, +2 nonpitting edema, fluids WDL, hypokalemia, hypophosphatemia Wound Type: None       Current Nutrition Intake & Therapies:    Average Meal Intake: NPO     Diet NPO  ADULT TUBE FEEDING; Orogastric; Standard with Fiber; Continuous; 10; Yes; 10; Q 4 hours; 40; 30; Q 4 hours  Current Tube Feeding (TF) Orders:  Feeding Route: Orogastric  Formula: Standard with Fiber  Schedule: Continuous  Feeding Regimen: 40 ml/hr, running at goal  Water Flushes: 30 ml q 4 hrs = 180 ml water  Current TF Provides: 960 ml tv, 1440 kcals, 61 gm pro, 730 ml free water, 910 ml total fluids    Anthropometric Measures:  Height: 162.6 cm (5' 4\")  Ideal Body Weight (IBW): 120 lbs (55 kg)    Admission Body Weight: 74.8 kg (165 lb) (1/13 actual)  Current Body Weight: 74.8 kg (165 lb) (1/13 actual admit wt as CBW elevated), 137.5 % IBW.    Current 
Critical Care Admit/Consult Note         Patient - Jimena Rodriguez   MRN -  [id number]   [id number]   - 1950      Date of Admission -  2025  5:41 PM  Date of evaluation -  2025   Hospital Day - 0            ADMIT/CONSULT DETAILS     Reason for Admit/Consult   Respiratory arrest     Consulting Service/Physician   Consulting - Carlos Luevano MD  Primary Care Physician - No primary care provider on file.         HPI   The patient is a 74 y.o. female with significant past medical history of hypertension and dementia. History obtained from chart. Patient presented to ED after having seizure-like activity at dinner and becoming unresponsive. She was intubated by EMS and paced transcutaneously on route to ED.     On arrival to ED, initial vital signs were 93.7, 66, 41, 74/45. ABG: Ph 7.27, pCO2 57, pO2 234, HCO3 25.8.  Endotracheal tube was exchanged in ED, at which time food debris was suctioned from airway. She became bradycardic without pulses, received one round of ACLS with one dose of epinephrine. ROSC was obtained, remained hypotensive and initiated on Levophed drip.  CTA was suggestive of aspiration in all lobes, worse on the right base.  Treated with Zosyn for aspiration pneumonia.              Past Medical History         Diagnosis Date    Hypertension         Past Surgical History           Procedure Laterality Date    CHOLECYSTECTOMY      HYSTERECTOMY (CERVIX STATUS UNKNOWN)      SKIN BIOPSY           Current Medications   Current Medications    fentanNYL  100 mcg IntraVENous Once     fentaNYL **AND** fentaNYL, sodium chloride flush  IV Drips/Infusions   norepinephrine 25 mcg/min (25)    fentaNYL 125 mcg/hr (25)     Home Medications  Not in a hospital admission.    Diet/Nutrition   No diet orders on file    Allergies   Pcn [penicillins]    Social History   Tobacco   reports that she has never smoked. She has never been exposed to tobacco smoke. She 
GENERAL SURGERY  CONSULT NOTE    Patient's Name/Date of Birth: Jimena Rodriguez / 1950    Date: January 25, 2025     PCP: No primary care provider on file.     Chief Complaint:   Chief Complaint   Patient presents with    Choking     Per ems patient was eating and had a witnessed seizure. EMS was pacing patient and intubated prior to arrival.        Physician Consulted: Dr. Bautista  Reason for Consult: Trach/PEG  Referring Physician: Dr. Alfonso    HPI  Jimena Rodriguez is a 74 y.o. female who presented this admission for reported witness seizure and cardiac arrest. Patient is presenting from a memory care center secondary to her advance dementia. Per chart review patient is bedbound at baseline. Patient admitted 1/14/2025 and has been intubated since admission; patient has had several attempts at SBT but respiratory notes that she will intermittently become apneic and has low tidal volumes. Patient is off of sedation and intermittently follows commands.     General surgery has been consulted for tracheostomy and PEG tube placement. Currently on 40% FiO2 and 8 PEEP. No prior hx of cirrhosis/ascites. Prior abdominal surgical hx of cholecystectomy and hysterectomy. No prior tracheostomy or neck surgeries. Not currently on any blood thinning medications.        Past Medical History:   Diagnosis Date    Hypertension        Past Surgical History:   Procedure Laterality Date    CHOLECYSTECTOMY      HYSTERECTOMY (CERVIX STATUS UNKNOWN)      SKIN BIOPSY         Medications Prior to Admission:    Prior to Admission medications    Medication Sig Start Date End Date Taking? Authorizing Provider   triamterene-hydroCHLOROthiazide (DYAZIDE) 37.5-25 MG per capsule Take 1 capsule by mouth every morning    ProviderGaudencio MD   magnesium hydroxide (MILK OF MAGNESIA) 400 MG/5ML suspension Take 30 mLs by mouth daily as needed for Constipation    Gaudencio Betancourt MD   memantine (NAMENDA) 10 MG tablet Take 1 tablet by mouth 
0.9 % 250 mL IVPB (Saco0Qfe)  1,250 mg IntraVENous Q24H Naomie Penaloza APRN - JULIAN        levETIRAcetam (KEPPRA) injection 1,000 mg  1,000 mg IntraVENous Q12H Karin Kennedy APRN - CNP   1,000 mg at 01/14/25 0250    atropine injection 1 mg  1 mg IntraVENous PRN Karin Kennedy APRN - CNP        piperacillin-tazobactam (ZOSYN) 4,500 mg in sodium chloride 0.9 % 100 mL IVPB (Tued6Fkg)  4,500 mg IntraVENous Q8H Sami Subramanian MD 25 mL/hr at 01/14/25 1020 4,500 mg at 01/14/25 1020    ipratropium 0.5 mg-albuterol 2.5 mg (DUONEB) nebulizer solution 1 Dose  1 Dose Inhalation Q4H WA RT Angelica Aguilera MD        norepinephrine (LEVOPHED) 16 mg in sodium chloride 0.9 % 250 mL infusion  1-100 mcg/min IntraVENous Continuous Wili Bowers MD 10.3 mL/hr at 01/14/25 0850 11 mcg/min at 01/14/25 0850    fentaNYL (SUBLIMAZE) 1,000 mcg in sodium chloride 0.9% 100 mL infusion   mcg/hr IntraVENous Continuous Wili Bowers MD   Stopped at 01/14/25 0831    And    fentaNYL (SUBLIMAZE) bolus from bag  50 mcg IntraVENous Q30 Min PRN Wili Bowers MD   50 mcg at 01/14/25 0020    sodium chloride flush 0.9 % injection 10 mL  10 mL IntraVENous PRN Elva Church MD                Allergies   Allergen Reactions    Pcn [Penicillins] Itching and Rash        Physical Examination  Vitals   Vitals:    01/14/25 0700 01/14/25 0800 01/14/25 0855 01/14/25 0900   BP: 137/75 (!) 96/50  (!) 91/54   Pulse: 56 67 82 79   Resp: 17 14 14 14   Temp:  99.3 °F (37.4 °C)     TempSrc:  Esophageal     SpO2: 99% 100% 100% 100%   Weight:          General: She appeared her stated age, she was moderately obese, she was intubated on the ventilator but responded to questioning  HEENT: atraumatic, eyes opening to voice, her neck was stiff, with limited range of motion in all directions  Lungs: diminished breath sounds in the left lower lung, no wheezes or rhonchi  Heart: systolic ejection murmur at second right intercostal space and along 
no

## 2025-01-26 ENCOUNTER — APPOINTMENT (OUTPATIENT)
Dept: GENERAL RADIOLOGY | Age: 75
DRG: 870 | End: 2025-01-26
Payer: MEDICARE

## 2025-01-26 LAB
AADO2: 131.9 MMHG
ALBUMIN SERPL-MCNC: 3.2 G/DL (ref 3.5–5.2)
ALP SERPL-CCNC: 96 U/L (ref 35–104)
ALT SERPL-CCNC: 13 U/L (ref 0–32)
ANION GAP SERPL CALCULATED.3IONS-SCNC: 8 MMOL/L (ref 7–16)
AST SERPL-CCNC: 18 U/L (ref 0–31)
B.E.: 2.1 MMOL/L (ref -3–3)
BASOPHILS # BLD: 0.01 K/UL (ref 0–0.2)
BASOPHILS NFR BLD: 0 % (ref 0–2)
BILIRUB SERPL-MCNC: 0.4 MG/DL (ref 0–1.2)
BUN SERPL-MCNC: 13 MG/DL (ref 6–23)
CALCIUM SERPL-MCNC: 8 MG/DL (ref 8.6–10.2)
CHLORIDE SERPL-SCNC: 108 MMOL/L (ref 98–107)
CO2 SERPL-SCNC: 25 MMOL/L (ref 22–29)
COHB: 1.1 % (ref 0–1.5)
CREAT SERPL-MCNC: 0.6 MG/DL (ref 0.5–1)
CRITICAL: ABNORMAL
DATE ANALYZED: ABNORMAL
DATE OF COLLECTION: ABNORMAL
EOSINOPHIL # BLD: 0.18 K/UL (ref 0.05–0.5)
EOSINOPHILS RELATIVE PERCENT: 3 % (ref 0–6)
ERYTHROCYTE [DISTWIDTH] IN BLOOD BY AUTOMATED COUNT: 15.6 % (ref 11.5–15)
FIO2: 40 %
GFR, ESTIMATED: >90 ML/MIN/1.73M2
GLUCOSE SERPL-MCNC: 127 MG/DL (ref 74–99)
HCO3: 25.1 MMOL/L (ref 22–26)
HCT VFR BLD AUTO: 23.6 % (ref 34–48)
HGB BLD-MCNC: 7.7 G/DL (ref 11.5–15.5)
HHB: 1.6 % (ref 0–5)
IMM GRANULOCYTES # BLD AUTO: 0.03 K/UL (ref 0–0.58)
IMM GRANULOCYTES NFR BLD: 0 % (ref 0–5)
LAB: ABNORMAL
LYMPHOCYTES NFR BLD: 1.39 K/UL (ref 1.5–4)
LYMPHOCYTES RELATIVE PERCENT: 19 % (ref 20–42)
Lab: 438
MAGNESIUM SERPL-MCNC: 2.1 MG/DL (ref 1.6–2.6)
MCH RBC QN AUTO: 31.3 PG (ref 26–35)
MCHC RBC AUTO-ENTMCNC: 32.6 G/DL (ref 32–34.5)
MCV RBC AUTO: 95.9 FL (ref 80–99.9)
METHB: 0.5 % (ref 0–1.5)
MODE: AC
MONOCYTES NFR BLD: 0.41 K/UL (ref 0.1–0.95)
MONOCYTES NFR BLD: 6 % (ref 2–12)
NEUTROPHILS NFR BLD: 73 % (ref 43–80)
NEUTS SEG NFR BLD: 5.32 K/UL (ref 1.8–7.3)
O2 SATURATION: 98.4 % (ref 92–98.5)
O2HB: 96.8 % (ref 94–97)
OPERATOR ID: 3342
PATIENT TEMP: 37 C
PCO2: 32.3 MMHG (ref 35–45)
PEEP/CPAP: 8 CMH2O
PFO2: 2.9 MMHG/%
PH BLOOD GAS: 7.51 (ref 7.35–7.45)
PHOSPHATE SERPL-MCNC: 1.8 MG/DL (ref 2.5–4.5)
PLATELET # BLD AUTO: 164 K/UL (ref 130–450)
PMV BLD AUTO: 11.7 FL (ref 7–12)
PO2: 116.2 MMHG (ref 75–100)
POTASSIUM SERPL-SCNC: 3.6 MMOL/L (ref 3.5–5)
PROT SERPL-MCNC: 5.6 G/DL (ref 6.4–8.3)
RBC # BLD AUTO: 2.46 M/UL (ref 3.5–5.5)
RI(T): 1.13
RR MECHANICAL: 12 B/MIN
SODIUM SERPL-SCNC: 141 MMOL/L (ref 132–146)
SOURCE, BLOOD GAS: ABNORMAL
THB: 8.4 G/DL (ref 11.5–16.5)
TIME ANALYZED: 442
VT MECHANICAL: 450 ML
WBC OTHER # BLD: 7.3 K/UL (ref 4.5–11.5)

## 2025-01-26 PROCEDURE — 6370000000 HC RX 637 (ALT 250 FOR IP): Performed by: INTERNAL MEDICINE

## 2025-01-26 PROCEDURE — 94640 AIRWAY INHALATION TREATMENT: CPT

## 2025-01-26 PROCEDURE — 6360000002 HC RX W HCPCS

## 2025-01-26 PROCEDURE — 6370000000 HC RX 637 (ALT 250 FOR IP)

## 2025-01-26 PROCEDURE — 94003 VENT MGMT INPAT SUBQ DAY: CPT

## 2025-01-26 PROCEDURE — 2500000003 HC RX 250 WO HCPCS: Performed by: NURSE PRACTITIONER

## 2025-01-26 PROCEDURE — 2580000003 HC RX 258: Performed by: NURSE PRACTITIONER

## 2025-01-26 PROCEDURE — 2500000003 HC RX 250 WO HCPCS

## 2025-01-26 PROCEDURE — 6360000002 HC RX W HCPCS: Performed by: INTERNAL MEDICINE

## 2025-01-26 PROCEDURE — 74018 RADEX ABDOMEN 1 VIEW: CPT

## 2025-01-26 PROCEDURE — 94669 MECHANICAL CHEST WALL OSCILL: CPT

## 2025-01-26 PROCEDURE — 2000000000 HC ICU R&B

## 2025-01-26 PROCEDURE — 71045 X-RAY EXAM CHEST 1 VIEW: CPT

## 2025-01-26 PROCEDURE — 2580000003 HC RX 258

## 2025-01-26 PROCEDURE — 82805 BLOOD GASES W/O2 SATURATION: CPT

## 2025-01-26 PROCEDURE — 2580000003 HC RX 258: Performed by: INTERNAL MEDICINE

## 2025-01-26 RX ORDER — FENTANYL CITRATE 50 UG/ML
200 INJECTION, SOLUTION INTRAMUSCULAR; INTRAVENOUS
Status: COMPLETED | OUTPATIENT
Start: 2025-01-27 | End: 2025-01-27

## 2025-01-26 RX ORDER — MIDAZOLAM HYDROCHLORIDE 5 MG/ML
6 INJECTION, SOLUTION INTRAMUSCULAR; INTRAVENOUS
Status: COMPLETED | OUTPATIENT
Start: 2025-01-27 | End: 2025-01-27

## 2025-01-26 RX ORDER — VECURONIUM BROMIDE 1 MG/ML
10 INJECTION, POWDER, LYOPHILIZED, FOR SOLUTION INTRAVENOUS
Status: COMPLETED | OUTPATIENT
Start: 2025-01-27 | End: 2025-01-27

## 2025-01-26 RX ADMIN — MICONAZOLE NITRATE: 20 OINTMENT TOPICAL at 20:09

## 2025-01-26 RX ADMIN — SODIUM CHLORIDE, PRESERVATIVE FREE 20 MG: 5 INJECTION INTRAVENOUS at 08:10

## 2025-01-26 RX ADMIN — GUAIFENESIN 200 MG: 100 LIQUID ORAL at 05:00

## 2025-01-26 RX ADMIN — LEVOTHYROXINE SODIUM 25 MCG: 0.03 TABLET ORAL at 06:29

## 2025-01-26 RX ADMIN — SODIUM CHLORIDE, PRESERVATIVE FREE 10 ML: 5 INJECTION INTRAVENOUS at 08:11

## 2025-01-26 RX ADMIN — ACETYLCYSTEINE 600 MG: 200 INHALANT RESPIRATORY (INHALATION) at 11:34

## 2025-01-26 RX ADMIN — ACETYLCYSTEINE 600 MG: 200 INHALANT RESPIRATORY (INHALATION) at 08:34

## 2025-01-26 RX ADMIN — MEROPENEM 1000 MG: 1 INJECTION INTRAVENOUS at 03:40

## 2025-01-26 RX ADMIN — ACETYLCYSTEINE 600 MG: 200 INHALANT RESPIRATORY (INHALATION) at 20:19

## 2025-01-26 RX ADMIN — MEROPENEM 1000 MG: 1 INJECTION INTRAVENOUS at 10:04

## 2025-01-26 RX ADMIN — IPRATROPIUM BROMIDE AND ALBUTEROL SULFATE 1 DOSE: 2.5; .5 SOLUTION RESPIRATORY (INHALATION) at 15:54

## 2025-01-26 RX ADMIN — MICONAZOLE NITRATE: 20 OINTMENT TOPICAL at 08:11

## 2025-01-26 RX ADMIN — GUAIFENESIN 200 MG: 100 LIQUID ORAL at 20:09

## 2025-01-26 RX ADMIN — MEROPENEM 1000 MG: 1 INJECTION INTRAVENOUS at 17:45

## 2025-01-26 RX ADMIN — IPRATROPIUM BROMIDE AND ALBUTEROL SULFATE 1 DOSE: 2.5; .5 SOLUTION RESPIRATORY (INHALATION) at 20:19

## 2025-01-26 RX ADMIN — SODIUM CHLORIDE, PRESERVATIVE FREE 20 MG: 5 INJECTION INTRAVENOUS at 20:09

## 2025-01-26 RX ADMIN — Medication 15 ML: at 20:08

## 2025-01-26 RX ADMIN — Medication 100 UNITS: at 08:10

## 2025-01-26 RX ADMIN — Medication 15 ML: at 08:10

## 2025-01-26 RX ADMIN — GUAIFENESIN 200 MG: 100 LIQUID ORAL at 16:32

## 2025-01-26 RX ADMIN — IPRATROPIUM BROMIDE AND ALBUTEROL SULFATE 1 DOSE: 2.5; .5 SOLUTION RESPIRATORY (INHALATION) at 11:34

## 2025-01-26 RX ADMIN — IPRATROPIUM BROMIDE AND ALBUTEROL SULFATE 1 DOSE: 2.5; .5 SOLUTION RESPIRATORY (INHALATION) at 08:34

## 2025-01-26 RX ADMIN — SODIUM CHLORIDE, PRESERVATIVE FREE 10 ML: 5 INJECTION INTRAVENOUS at 20:10

## 2025-01-26 RX ADMIN — Medication 100 UNITS: at 20:09

## 2025-01-26 RX ADMIN — ENOXAPARIN SODIUM 40 MG: 100 INJECTION SUBCUTANEOUS at 08:10

## 2025-01-26 RX ADMIN — POTASSIUM PHOSPHATE, MONOBASIC AND POTASSIUM PHOSPHATE, DIBASIC 15 MMOL: 224; 236 INJECTION, SOLUTION, CONCENTRATE INTRAVENOUS at 07:56

## 2025-01-26 ASSESSMENT — PULMONARY FUNCTION TESTS
PIF_VALUE: 28
PIF_VALUE: 27
PIF_VALUE: 19
PIF_VALUE: 23
PIF_VALUE: 19
PIF_VALUE: 27
PIF_VALUE: 25
PIF_VALUE: 27
PIF_VALUE: 29
PIF_VALUE: 23
PIF_VALUE: 25
PIF_VALUE: 19
PIF_VALUE: 27
PIF_VALUE: 30
PIF_VALUE: 18
PIF_VALUE: 23
PIF_VALUE: 19
PIF_VALUE: 19
PIF_VALUE: 25
PIF_VALUE: 40
PIF_VALUE: 29
PIF_VALUE: 18
PIF_VALUE: 24
PIF_VALUE: 25
PIF_VALUE: 27
PIF_VALUE: 19
PIF_VALUE: 22
PIF_VALUE: 19

## 2025-01-26 ASSESSMENT — PAIN SCALES - GENERAL
PAINLEVEL_OUTOF10: 0

## 2025-01-27 ENCOUNTER — APPOINTMENT (OUTPATIENT)
Dept: GENERAL RADIOLOGY | Age: 75
DRG: 870 | End: 2025-01-27
Payer: MEDICARE

## 2025-01-27 PROBLEM — J95.851 VENTILATOR ASSOCIATED PNEUMONIA (HCC): Status: ACTIVE | Noted: 2025-01-27

## 2025-01-27 PROBLEM — T17.908A ASPIRATION INTO AIRWAY: Status: ACTIVE | Noted: 2025-01-27

## 2025-01-27 LAB
AADO2: 128.8 MMHG
ALBUMIN SERPL-MCNC: 3.3 G/DL (ref 3.5–5.2)
ALP SERPL-CCNC: 115 U/L (ref 35–104)
ALT SERPL-CCNC: 12 U/L (ref 0–32)
ANION GAP SERPL CALCULATED.3IONS-SCNC: 11 MMOL/L (ref 7–16)
AST SERPL-CCNC: 18 U/L (ref 0–31)
B.E.: 2.5 MMOL/L (ref -3–3)
BASOPHILS # BLD: 0.02 K/UL (ref 0–0.2)
BASOPHILS NFR BLD: 0 % (ref 0–2)
BILIRUB SERPL-MCNC: 0.4 MG/DL (ref 0–1.2)
BUN SERPL-MCNC: 13 MG/DL (ref 6–23)
CALCIUM SERPL-MCNC: 8.5 MG/DL (ref 8.6–10.2)
CHLORIDE SERPL-SCNC: 107 MMOL/L (ref 98–107)
CO2 SERPL-SCNC: 24 MMOL/L (ref 22–29)
COHB: 0.2 % (ref 0–1.5)
CREAT SERPL-MCNC: 0.6 MG/DL (ref 0.5–1)
CRITICAL: ABNORMAL
DATE ANALYZED: ABNORMAL
DATE OF COLLECTION: ABNORMAL
EOSINOPHIL # BLD: 0.19 K/UL (ref 0.05–0.5)
EOSINOPHILS RELATIVE PERCENT: 3 % (ref 0–6)
ERYTHROCYTE [DISTWIDTH] IN BLOOD BY AUTOMATED COUNT: 15.9 % (ref 11.5–15)
FIO2: 40 %
GFR, ESTIMATED: >90 ML/MIN/1.73M2
GLUCOSE SERPL-MCNC: 104 MG/DL (ref 74–99)
HCO3: 25.3 MMOL/L (ref 22–26)
HCT VFR BLD AUTO: 24.3 % (ref 34–48)
HGB BLD-MCNC: 7.8 G/DL (ref 11.5–15.5)
HHB: 1.7 % (ref 0–5)
IMM GRANULOCYTES # BLD AUTO: 0.03 K/UL (ref 0–0.58)
IMM GRANULOCYTES NFR BLD: 0 % (ref 0–5)
INR PPP: 1.2
LAB: ABNORMAL
LYMPHOCYTES NFR BLD: 1.41 K/UL (ref 1.5–4)
LYMPHOCYTES RELATIVE PERCENT: 20 % (ref 20–42)
Lab: 415
MAGNESIUM SERPL-MCNC: 2.2 MG/DL (ref 1.6–2.6)
MCH RBC QN AUTO: 31.3 PG (ref 26–35)
MCHC RBC AUTO-ENTMCNC: 32.1 G/DL (ref 32–34.5)
MCV RBC AUTO: 97.6 FL (ref 80–99.9)
METHB: 0.6 % (ref 0–1.5)
MODE: AC
MONOCYTES NFR BLD: 0.44 K/UL (ref 0.1–0.95)
MONOCYTES NFR BLD: 6 % (ref 2–12)
NEUTROPHILS NFR BLD: 71 % (ref 43–80)
NEUTS SEG NFR BLD: 5.05 K/UL (ref 1.8–7.3)
O2 SATURATION: 98.3 % (ref 92–98.5)
O2HB: 97.5 % (ref 94–97)
OPERATOR ID: 2593
PARTIAL THROMBOPLASTIN TIME: 30.7 SEC (ref 24.5–35.1)
PATIENT TEMP: 37 C
PCO2: 32 MMHG (ref 35–45)
PEEP/CPAP: 8 CMH2O
PFO2: 2.99 MMHG/%
PH BLOOD GAS: 7.52 (ref 7.35–7.45)
PHOSPHATE SERPL-MCNC: 2.1 MG/DL (ref 2.5–4.5)
PLATELET # BLD AUTO: 168 K/UL (ref 130–450)
PMV BLD AUTO: 11.5 FL (ref 7–12)
PO2: 119.6 MMHG (ref 75–100)
POTASSIUM SERPL-SCNC: 3.5 MMOL/L (ref 3.5–5)
PROT SERPL-MCNC: 5.9 G/DL (ref 6.4–8.3)
PROTHROMBIN TIME: 13.2 SEC (ref 9.3–12.4)
RBC # BLD AUTO: 2.49 M/UL (ref 3.5–5.5)
RI(T): 1.08
RR MECHANICAL: 12 B/MIN
SODIUM SERPL-SCNC: 142 MMOL/L (ref 132–146)
SOURCE, BLOOD GAS: ABNORMAL
THB: 8.7 G/DL (ref 11.5–16.5)
TIME ANALYZED: 430
VT MECHANICAL: 450 ML
WBC OTHER # BLD: 7.1 K/UL (ref 4.5–11.5)

## 2025-01-27 PROCEDURE — 2709999900 HC NON-CHARGEABLE SUPPLY: Performed by: STUDENT IN AN ORGANIZED HEALTH CARE EDUCATION/TRAINING PROGRAM

## 2025-01-27 PROCEDURE — 2580000003 HC RX 258: Performed by: NURSE PRACTITIONER

## 2025-01-27 PROCEDURE — 6360000002 HC RX W HCPCS: Performed by: INTERNAL MEDICINE

## 2025-01-27 PROCEDURE — 2500000003 HC RX 250 WO HCPCS

## 2025-01-27 PROCEDURE — 71045 X-RAY EXAM CHEST 1 VIEW: CPT

## 2025-01-27 PROCEDURE — 2580000003 HC RX 258: Performed by: INTERNAL MEDICINE

## 2025-01-27 PROCEDURE — 03HY32Z INSERTION OF MONITORING DEVICE INTO UPPER ARTERY, PERCUTANEOUS APPROACH: ICD-10-PCS | Performed by: STUDENT IN AN ORGANIZED HEALTH CARE EDUCATION/TRAINING PROGRAM

## 2025-01-27 PROCEDURE — 0DH64UZ INSERTION OF FEEDING DEVICE INTO STOMACH, PERCUTANEOUS ENDOSCOPIC APPROACH: ICD-10-PCS | Performed by: STUDENT IN AN ORGANIZED HEALTH CARE EDUCATION/TRAINING PROGRAM

## 2025-01-27 PROCEDURE — 80053 COMPREHEN METABOLIC PANEL: CPT

## 2025-01-27 PROCEDURE — 94003 VENT MGMT INPAT SUBQ DAY: CPT

## 2025-01-27 PROCEDURE — 2000000000 HC ICU R&B

## 2025-01-27 PROCEDURE — 6370000000 HC RX 637 (ALT 250 FOR IP)

## 2025-01-27 PROCEDURE — 84100 ASSAY OF PHOSPHORUS: CPT

## 2025-01-27 PROCEDURE — 6360000002 HC RX W HCPCS

## 2025-01-27 PROCEDURE — 6360000002 HC RX W HCPCS: Performed by: STUDENT IN AN ORGANIZED HEALTH CARE EDUCATION/TRAINING PROGRAM

## 2025-01-27 PROCEDURE — 82805 BLOOD GASES W/O2 SATURATION: CPT

## 2025-01-27 PROCEDURE — 85025 COMPLETE CBC W/AUTO DIFF WBC: CPT

## 2025-01-27 PROCEDURE — 85730 THROMBOPLASTIN TIME PARTIAL: CPT

## 2025-01-27 PROCEDURE — 85610 PROTHROMBIN TIME: CPT

## 2025-01-27 PROCEDURE — 94640 AIRWAY INHALATION TREATMENT: CPT

## 2025-01-27 PROCEDURE — 2580000003 HC RX 258

## 2025-01-27 PROCEDURE — 2500000003 HC RX 250 WO HCPCS: Performed by: NURSE PRACTITIONER

## 2025-01-27 PROCEDURE — 0DJ08ZZ INSPECTION OF UPPER INTESTINAL TRACT, VIA NATURAL OR ARTIFICIAL OPENING ENDOSCOPIC: ICD-10-PCS | Performed by: STUDENT IN AN ORGANIZED HEALTH CARE EDUCATION/TRAINING PROGRAM

## 2025-01-27 PROCEDURE — 2500000003 HC RX 250 WO HCPCS: Performed by: STUDENT IN AN ORGANIZED HEALTH CARE EDUCATION/TRAINING PROGRAM

## 2025-01-27 PROCEDURE — 3609013300 HC EGD TUBE PLACEMENT: Performed by: STUDENT IN AN ORGANIZED HEALTH CARE EDUCATION/TRAINING PROGRAM

## 2025-01-27 PROCEDURE — 83735 ASSAY OF MAGNESIUM: CPT

## 2025-01-27 PROCEDURE — 0DH53YZ INSERTION OF OTHER DEVICE INTO ESOPHAGUS, PERCUTANEOUS APPROACH: ICD-10-PCS | Performed by: STUDENT IN AN ORGANIZED HEALTH CARE EDUCATION/TRAINING PROGRAM

## 2025-01-27 RX ORDER — WATER 10 ML/10ML
INJECTION INTRAMUSCULAR; INTRAVENOUS; SUBCUTANEOUS
Status: COMPLETED
Start: 2025-01-27 | End: 2025-01-27

## 2025-01-27 RX ORDER — LIDOCAINE HYDROCHLORIDE 10 MG/ML
INJECTION, SOLUTION INFILTRATION; PERINEURAL
Status: COMPLETED
Start: 2025-01-27 | End: 2025-01-27

## 2025-01-27 RX ADMIN — MIDAZOLAM 3 MG: 5 INJECTION INTRAMUSCULAR; INTRAVENOUS at 08:55

## 2025-01-27 RX ADMIN — ACETYLCYSTEINE 600 MG: 200 INHALANT RESPIRATORY (INHALATION) at 08:45

## 2025-01-27 RX ADMIN — Medication 100 UNITS: at 11:10

## 2025-01-27 RX ADMIN — LEVOTHYROXINE SODIUM 25 MCG: 0.03 TABLET ORAL at 11:05

## 2025-01-27 RX ADMIN — MEROPENEM 1000 MG: 1 INJECTION INTRAVENOUS at 18:34

## 2025-01-27 RX ADMIN — IPRATROPIUM BROMIDE AND ALBUTEROL SULFATE 1 DOSE: 2.5; .5 SOLUTION RESPIRATORY (INHALATION) at 08:45

## 2025-01-27 RX ADMIN — MEROPENEM 1000 MG: 1 INJECTION INTRAVENOUS at 02:40

## 2025-01-27 RX ADMIN — IPRATROPIUM BROMIDE AND ALBUTEROL SULFATE 1 DOSE: 2.5; .5 SOLUTION RESPIRATORY (INHALATION) at 20:04

## 2025-01-27 RX ADMIN — MICONAZOLE NITRATE: 20 OINTMENT TOPICAL at 11:10

## 2025-01-27 RX ADMIN — GUAIFENESIN 200 MG: 100 LIQUID ORAL at 20:43

## 2025-01-27 RX ADMIN — ACETYLCYSTEINE 600 MG: 200 INHALANT RESPIRATORY (INHALATION) at 20:04

## 2025-01-27 RX ADMIN — GUAIFENESIN 200 MG: 100 LIQUID ORAL at 16:16

## 2025-01-27 RX ADMIN — SODIUM CHLORIDE, PRESERVATIVE FREE 20 MG: 5 INJECTION INTRAVENOUS at 11:05

## 2025-01-27 RX ADMIN — IPRATROPIUM BROMIDE AND ALBUTEROL SULFATE 1 DOSE: 2.5; .5 SOLUTION RESPIRATORY (INHALATION) at 12:21

## 2025-01-27 RX ADMIN — Medication 100 UNITS: at 20:43

## 2025-01-27 RX ADMIN — ACETYLCYSTEINE 600 MG: 200 INHALANT RESPIRATORY (INHALATION) at 16:01

## 2025-01-27 RX ADMIN — IPRATROPIUM BROMIDE AND ALBUTEROL SULFATE 1 DOSE: 2.5; .5 SOLUTION RESPIRATORY (INHALATION) at 16:01

## 2025-01-27 RX ADMIN — POTASSIUM PHOSPHATE, MONOBASIC AND POTASSIUM PHOSPHATE, DIBASIC 20 MMOL: 224; 236 INJECTION, SOLUTION, CONCENTRATE INTRAVENOUS at 07:44

## 2025-01-27 RX ADMIN — MEROPENEM 1000 MG: 1 INJECTION INTRAVENOUS at 11:12

## 2025-01-27 RX ADMIN — WATER: 1 INJECTION INTRAMUSCULAR; INTRAVENOUS; SUBCUTANEOUS at 11:23

## 2025-01-27 RX ADMIN — VECURONIUM BROMIDE 10 MG: 1 INJECTION, POWDER, LYOPHILIZED, FOR SOLUTION INTRAVENOUS at 08:56

## 2025-01-27 RX ADMIN — SODIUM CHLORIDE, PRESERVATIVE FREE 10 ML: 5 INJECTION INTRAVENOUS at 11:09

## 2025-01-27 RX ADMIN — MICONAZOLE NITRATE: 20 OINTMENT TOPICAL at 20:43

## 2025-01-27 RX ADMIN — FENTANYL CITRATE 150 MCG: 50 INJECTION INTRAMUSCULAR; INTRAVENOUS at 08:55

## 2025-01-27 RX ADMIN — LIDOCAINE HYDROCHLORIDE: 10 INJECTION, SOLUTION INFILTRATION; PERINEURAL at 11:06

## 2025-01-27 RX ADMIN — Medication 15 ML: at 20:43

## 2025-01-27 RX ADMIN — GUAIFENESIN 200 MG: 100 LIQUID ORAL at 11:05

## 2025-01-27 RX ADMIN — Medication 15 ML: at 11:04

## 2025-01-27 RX ADMIN — SODIUM CHLORIDE, PRESERVATIVE FREE 10 ML: 5 INJECTION INTRAVENOUS at 20:42

## 2025-01-27 RX ADMIN — SODIUM CHLORIDE, PRESERVATIVE FREE 20 MG: 5 INJECTION INTRAVENOUS at 20:43

## 2025-01-27 ASSESSMENT — PULMONARY FUNCTION TESTS
PIF_VALUE: 25
PIF_VALUE: 25
PIF_VALUE: 26
PIF_VALUE: 33
PIF_VALUE: 30
PIF_VALUE: 24
PIF_VALUE: 26
PIF_VALUE: 27
PIF_VALUE: 26
PIF_VALUE: 29
PIF_VALUE: 23
PIF_VALUE: 26
PIF_VALUE: 100
PIF_VALUE: 24
PIF_VALUE: 29
PIF_VALUE: 50
PIF_VALUE: 25
PIF_VALUE: 28
PIF_VALUE: 23
PIF_VALUE: 30
PIF_VALUE: 28
PIF_VALUE: 25
PIF_VALUE: 23
PIF_VALUE: 26
PIF_VALUE: 30
PIF_VALUE: 28
PIF_VALUE: 25
PIF_VALUE: 24
PIF_VALUE: 26
PIF_VALUE: 23
PIF_VALUE: 27

## 2025-01-27 ASSESSMENT — PAIN SCALES - GENERAL
PAINLEVEL_OUTOF10: 0

## 2025-01-27 NOTE — CARE COORDINATION
Care Coordination: LOS 14 day.  Trach/peg at bedside this am. Spoke to  and family at bedside. Plan is [facility]. Pt is medicare/medicaid.  Pt was LTC at [facility]. Goal is to get her back - if able to be weaned off vent.  [facility], beds [facility]. Awaiting acceptance. Ambulance form updated.  Escalated to  to add medicaid to insurance    Electronically signed by Stefania Lugo RN on 1/27/2025 at 11:12 AM

## 2025-01-27 NOTE — OP NOTE
Operative Note      Patient: Jimena Rodriguez  YOB: 1950  MRN: [id number]    Date of Procedure: 1/27/2025    Pre-Op Diagnosis Codes:      * Acute respiratory failure, unspecified whether with hypoxia or hypercapnia [J96.00]    Post-Op Diagnosis: Same       Procedure performed: Diagnostic EDG and percutaneous endoscopic gastrostomy tube placement     Surgeon(s):  Luis Goodwin MD Sippel, Michael, MD    Assistant:   * No surgical staff found *    Anesthesia: None    Estimated Blood Loss (mL): Minimal    Complications: None    Specimens:   * No specimens in log *    Implants:  * No implants in log *      Drains:   External Urinary Catheter (Active)   Site Assessment Other (Comment) 01/27/25 0200   Placement Replaced 01/27/25 0200   Securement Method Tape 01/27/25 0200   Catheter Care Catheter/Wick replaced 01/27/25 0200   Perineal Care Yes 01/27/25 0200   Suction 40 mmgHg continuous 01/27/25 0200   Urine Color Ashley 01/26/25 1800   Urine Appearance Cloudy 01/26/25 1800   Urine Odor Malodorous 01/25/25 1600   Output (mL) 800 mL 01/26/25 1800       Fecal Management System 01/25/25 (Active)   Stool Appearance Loose 01/25/25 2000   Stool Color Brown 01/25/25 2000   Position of Indicator Line Visible 01/25/25 2000   Balloon Volume Verified Yes 01/25/25 2000   Skin Assessment of the Anal Area Unremarkable 01/25/25 2000   Tube Irrigated Yes 01/25/25 2000   Irrigation Volume (mL) 100 01/25/25 2000   Stool Amount Medium 01/25/25 2000       [REMOVED] NG/OG/NJ/NE Tube Orogastric (Removed)   Surrounding Skin Clean, dry & intact 01/27/25 0600   Securement device Tape 01/27/25 0600   Status Clamped 01/27/25 0600   Placement Verified External Catheter Length 01/27/25 0600   NG/OG/NJ/NE External Measurement (cm) 55 cm 01/27/25 0600   Drainage Appearance Tan 01/27/25 0600   Tube Feeding Standard without Fiber 01/27/25 0600   Tube feeding/verify rate (mL/hr) 50 mL/hr 01/26/25 1600   Tube Feeding Intake (mL) 93 ml

## 2025-01-27 NOTE — OP NOTE
Operative Note      Patient: Jimena Rodriguez  YOB: 1950  MRN: [id number]    Date of Procedure: 1/27/2025    Pre-Op Diagnosis Codes:      * Acute respiratory failure, unspecified whether with hypoxia or hypercapnia [J96.00]    Post-Op Diagnosis: Same       Procedure(s):  TRACHEOTOMY PERCUTANEOUS BRONCHOSCOPY  ESOPHAGOGASTRODUODENOSCOPY PERCUTANEOUS ENDOSCOPIC GASTROSTOMY TUBE INSERTION  Bronchoscopy with aspiration of the pulmonary tree     Surgeon(s):  Luis Goodwin MD Sippel, Michael, MD    Assistant:   * No surgical staff found *    Anesthesia: None    Estimated Blood Loss (mL): Minimal    Complications: None    Specimens:   * No specimens in log *    Implants:  * No implants in log *      Drains:   NG/OG/NJ/NE Tube Orogastric (Active)   Surrounding Skin Clean, dry & intact 01/27/25 0600   Securement device Tape 01/27/25 0600   Status Clamped 01/27/25 0600   Placement Verified External Catheter Length 01/27/25 0600   NG/OG/NJ/NE External Measurement (cm) 55 cm 01/27/25 0600   Drainage Appearance Tan 01/27/25 0600   Tube Feeding Standard without Fiber 01/27/25 0600   Tube feeding/verify rate (mL/hr) 50 mL/hr 01/26/25 1600   Tube Feeding Intake (mL) 93 ml 01/26/25 1600   Free Water/Flush (mL) 0 mL 01/26/25 1600   Action Taken Other (comment) 01/27/25 0000   Residual Volume (ml) 20 ml 01/24/25 2000       External Urinary Catheter (Active)   Site Assessment Other (Comment) 01/27/25 0200   Placement Replaced 01/27/25 0200   Securement Method Tape 01/27/25 0200   Catheter Care Catheter/Wick replaced 01/27/25 0200   Perineal Care Yes 01/27/25 0200   Suction 40 mmgHg continuous 01/27/25 0200   Urine Color Ashley 01/26/25 1800   Urine Appearance Cloudy 01/26/25 1800   Urine Odor Malodorous 01/25/25 1600   Output (mL) 800 mL 01/26/25 1800       Fecal Management System 01/25/25 (Active)   Stool Appearance Loose 01/25/25 2000   Stool Color Brown 01/25/25 2000   Position of Indicator Line Visible 01/25/25 2000

## 2025-01-28 ENCOUNTER — HOSPITAL ENCOUNTER (OUTPATIENT)
Dept: INPATIENT UNIT | Age: 75
Discharge: NURSING, REHAB OR LONG-TERM CARE | End: 2025-03-06
Attending: INTERNAL MEDICINE | Admitting: INTERNAL MEDICINE
Payer: MEDICARE

## 2025-01-28 VITALS
HEIGHT: 64 IN | BODY MASS INDEX: 31.01 KG/M2 | RESPIRATION RATE: 21 BRPM | HEART RATE: 63 BPM | SYSTOLIC BLOOD PRESSURE: 107 MMHG | OXYGEN SATURATION: 100 % | WEIGHT: 181.66 LBS | DIASTOLIC BLOOD PRESSURE: 49 MMHG | TEMPERATURE: 99.7 F

## 2025-01-28 LAB
ALBUMIN SERPL-MCNC: 3.3 G/DL (ref 3.5–5.2)
ALP SERPL-CCNC: 131 U/L (ref 35–104)
ALT SERPL-CCNC: 11 U/L (ref 0–32)
ANION GAP SERPL CALCULATED.3IONS-SCNC: 11 MMOL/L (ref 7–16)
AST SERPL-CCNC: 19 U/L (ref 0–31)
BASOPHILS # BLD: 0.01 K/UL (ref 0–0.2)
BASOPHILS NFR BLD: 0 % (ref 0–2)
BILIRUB SERPL-MCNC: 0.8 MG/DL (ref 0–1.2)
BUN SERPL-MCNC: 13 MG/DL (ref 6–23)
CALCIUM SERPL-MCNC: 8.6 MG/DL (ref 8.6–10.2)
CHLORIDE SERPL-SCNC: 111 MMOL/L (ref 98–107)
CO2 SERPL-SCNC: 21 MMOL/L (ref 22–29)
CREAT SERPL-MCNC: 0.6 MG/DL (ref 0.5–1)
EOSINOPHIL # BLD: 0.06 K/UL (ref 0.05–0.5)
EOSINOPHILS RELATIVE PERCENT: 1 % (ref 0–6)
ERYTHROCYTE [DISTWIDTH] IN BLOOD BY AUTOMATED COUNT: 15.9 % (ref 11.5–15)
GFR, ESTIMATED: >90 ML/MIN/1.73M2
GLUCOSE SERPL-MCNC: 91 MG/DL (ref 74–99)
HCT VFR BLD AUTO: 24.4 % (ref 34–48)
HGB BLD-MCNC: 7.9 G/DL (ref 11.5–15.5)
IMM GRANULOCYTES # BLD AUTO: 0.05 K/UL (ref 0–0.58)
IMM GRANULOCYTES NFR BLD: 1 % (ref 0–5)
LYMPHOCYTES NFR BLD: 1.32 K/UL (ref 1.5–4)
LYMPHOCYTES RELATIVE PERCENT: 19 % (ref 20–42)
MAGNESIUM SERPL-MCNC: 2.2 MG/DL (ref 1.6–2.6)
MCH RBC QN AUTO: 31.7 PG (ref 26–35)
MCHC RBC AUTO-ENTMCNC: 32.4 G/DL (ref 32–34.5)
MCV RBC AUTO: 98 FL (ref 80–99.9)
MONOCYTES NFR BLD: 0.44 K/UL (ref 0.1–0.95)
MONOCYTES NFR BLD: 6 % (ref 2–12)
NEUTROPHILS NFR BLD: 73 % (ref 43–80)
NEUTS SEG NFR BLD: 5.01 K/UL (ref 1.8–7.3)
PHOSPHATE SERPL-MCNC: 2.2 MG/DL (ref 2.5–4.5)
PLATELET # BLD AUTO: 170 K/UL (ref 130–450)
PMV BLD AUTO: 11.7 FL (ref 7–12)
POTASSIUM SERPL-SCNC: 3.7 MMOL/L (ref 3.5–5)
PROT SERPL-MCNC: 5.9 G/DL (ref 6.4–8.3)
RBC # BLD AUTO: 2.49 M/UL (ref 3.5–5.5)
SODIUM SERPL-SCNC: 143 MMOL/L (ref 132–146)
WBC OTHER # BLD: 6.9 K/UL (ref 4.5–11.5)

## 2025-01-28 PROCEDURE — 85025 COMPLETE CBC W/AUTO DIFF WBC: CPT

## 2025-01-28 PROCEDURE — 6360000002 HC RX W HCPCS

## 2025-01-28 PROCEDURE — 84100 ASSAY OF PHOSPHORUS: CPT

## 2025-01-28 PROCEDURE — 94640 AIRWAY INHALATION TREATMENT: CPT

## 2025-01-28 PROCEDURE — 2580000003 HC RX 258: Performed by: INTERNAL MEDICINE

## 2025-01-28 PROCEDURE — 6370000000 HC RX 637 (ALT 250 FOR IP)

## 2025-01-28 PROCEDURE — 6360000002 HC RX W HCPCS: Performed by: INTERNAL MEDICINE

## 2025-01-28 PROCEDURE — 2500000003 HC RX 250 WO HCPCS

## 2025-01-28 PROCEDURE — 97530 THERAPEUTIC ACTIVITIES: CPT

## 2025-01-28 PROCEDURE — 80053 COMPREHEN METABOLIC PANEL: CPT

## 2025-01-28 PROCEDURE — 94003 VENT MGMT INPAT SUBQ DAY: CPT

## 2025-01-28 PROCEDURE — 83735 ASSAY OF MAGNESIUM: CPT

## 2025-01-28 PROCEDURE — 2580000003 HC RX 258

## 2025-01-28 PROCEDURE — 97110 THERAPEUTIC EXERCISES: CPT

## 2025-01-28 RX ORDER — FAMOTIDINE 40 MG/1
40 TABLET, FILM COATED ORAL EVERY EVENING
Qty: 30 TABLET | Refills: 3 | Status: ON HOLD | OUTPATIENT
Start: 2025-01-28

## 2025-01-28 RX ORDER — GUAIFENESIN 200 MG/10ML
200 LIQUID ORAL EVERY 6 HOURS
Qty: 300 ML | Refills: 0 | Status: ON HOLD | OUTPATIENT
Start: 2025-01-28

## 2025-01-28 RX ORDER — ACETYLCYSTEINE 200 MG/ML
600 SOLUTION ORAL; RESPIRATORY (INHALATION)
Qty: 5 EACH | Refills: 0 | Status: ON HOLD | OUTPATIENT
Start: 2025-01-28

## 2025-01-28 RX ORDER — LEVOTHYROXINE SODIUM 25 UG/1
25 TABLET ORAL DAILY
Qty: 30 TABLET | Refills: 3 | Status: ON HOLD | OUTPATIENT
Start: 2025-01-29

## 2025-01-28 RX ORDER — SODIUM CHLORIDE, SODIUM LACTATE, POTASSIUM CHLORIDE, AND CALCIUM CHLORIDE .6; .31; .03; .02 G/100ML; G/100ML; G/100ML; G/100ML
500 INJECTION, SOLUTION INTRAVENOUS ONCE
Status: COMPLETED | OUTPATIENT
Start: 2025-01-28 | End: 2025-01-28

## 2025-01-28 RX ADMIN — SODIUM CHLORIDE, PRESERVATIVE FREE 10 ML: 5 INJECTION INTRAVENOUS at 09:10

## 2025-01-28 RX ADMIN — IPRATROPIUM BROMIDE AND ALBUTEROL SULFATE 1 DOSE: 2.5; .5 SOLUTION RESPIRATORY (INHALATION) at 07:47

## 2025-01-28 RX ADMIN — MEROPENEM 1000 MG: 1 INJECTION INTRAVENOUS at 09:13

## 2025-01-28 RX ADMIN — GUAIFENESIN 200 MG: 100 LIQUID ORAL at 09:08

## 2025-01-28 RX ADMIN — LEVOTHYROXINE SODIUM 25 MCG: 0.03 TABLET ORAL at 05:35

## 2025-01-28 RX ADMIN — Medication 15 ML: at 09:09

## 2025-01-28 RX ADMIN — ENOXAPARIN SODIUM 40 MG: 100 INJECTION SUBCUTANEOUS at 09:08

## 2025-01-28 RX ADMIN — MICONAZOLE NITRATE: 20 OINTMENT TOPICAL at 09:10

## 2025-01-28 RX ADMIN — SODIUM CHLORIDE, PRESERVATIVE FREE 20 MG: 5 INJECTION INTRAVENOUS at 09:08

## 2025-01-28 RX ADMIN — IPRATROPIUM BROMIDE AND ALBUTEROL SULFATE 1 DOSE: 2.5; .5 SOLUTION RESPIRATORY (INHALATION) at 12:25

## 2025-01-28 RX ADMIN — GUAIFENESIN 200 MG: 100 LIQUID ORAL at 04:32

## 2025-01-28 RX ADMIN — MEROPENEM 1000 MG: 1 INJECTION INTRAVENOUS at 02:40

## 2025-01-28 RX ADMIN — ACETYLCYSTEINE 600 MG: 200 INHALANT RESPIRATORY (INHALATION) at 07:49

## 2025-01-28 RX ADMIN — SODIUM CHLORIDE, SODIUM LACTATE, POTASSIUM CHLORIDE, AND CALCIUM CHLORIDE 500 ML: .6; .31; .03; .02 INJECTION, SOLUTION INTRAVENOUS at 09:53

## 2025-01-28 RX ADMIN — Medication 100 UNITS: at 09:08

## 2025-01-28 ASSESSMENT — PULMONARY FUNCTION TESTS
PIF_VALUE: 25
PIF_VALUE: 20
PIF_VALUE: 23
PIF_VALUE: 25
PIF_VALUE: 24
PIF_VALUE: 20
PIF_VALUE: 21
PIF_VALUE: 25
PIF_VALUE: 29
PIF_VALUE: 20
PIF_VALUE: 28
PIF_VALUE: 20
PIF_VALUE: 21
PIF_VALUE: 22
PIF_VALUE: 23

## 2025-01-28 ASSESSMENT — PAIN SCALES - GENERAL
PAINLEVEL_OUTOF10: 0
PAINLEVEL_OUTOF10: 0

## 2025-01-28 NOTE — CARE COORDINATION
Care Coordination: LOS 15 day. [facility] to see if they can accept pt today. Medicare, No precert. Spoke to christiano and she will let me know. Will need medical clearance for discharge.  Electronically signed by Stefania Lugo RN on 1/28/2025 at 8:32 AM     ADDENDUM: [facility], Bed 201,  Ready 3 pm if pt is cleared medically  Electronically signed by Stefania Lugo RN on 1/28/2025 at 9:52 AM     ADDENDUM: Spoke to [facility], set up for 230 . Room 201.  Ambulance transport completed  Electronically signed by Stefania Lugo RN on 1/28/2025 at 10:16 AM     ADDENDUM: Messaged Dr Jimenez for DC orders.  Electronically signed by Stefania Lugo RN on 1/28/2025 at 1:25 PM

## 2025-01-28 NOTE — DISCHARGE SUMMARY
Hospitalist Discharge Summary    Patient ID: Jimena Rodriguez   Patient : 1950  Patient's PCP: No primary care provider on file.    Admit Date: 2025   Admitting Physician: Carlos Luevano MD    Discharge Date:  2025   Discharge Physician: Liliane Jimenez MD   Discharge Condition: Stable  Discharge Disposition: LTAC      Hospital course in brief:  (Please refer to daily progress notes for a comprehensive review of the hospitalization by requesting medical records)    74 y.o. female with d/o dementia who presented to [facility] with altered mental status left-sided weakness seizure-like activity and choking episode.  She was intubated by EMS prior to arrival.  Blood gas of pH of 7.27 pCO2 57 and pO2 of 237.2.  Chest x-ray concerning for interstitial opacities concerning for aspiration.  She was given Zosyn. Also of note on presentation she was hypotensive blood pressure of 74/45 tachypneic.  In the 40s temperature 93.7.  She was intubated by EMS  In the emergency department, endotracheal tube was exchanged which at that time food debris was suctioned from the airway. Patient became bradycardic without pulses, received 1 round of ACLS with 1 dose of epinephrine. ROSC was obtained and patient remained hypotensive and was initiated on Levophed IV drip   CTA was suggestive of aspiration in all lobes, worse on the right base.   Patient was Intubated on   Bronchoscopy was done on   ; respiratory cx developed Proteus Mirabilis; patient was on merrem; s/p vancomycin; patient initially required pressor support which was eventually weaned off.  Patient had seizure like activity while in hospital controlled with kepra; eeg was non sepecific; echo- normal EF   s/p trach and PEG-on   Tube feeding was started on   Code status is dnr cca after discussion of family with palliative care team    On day of discharge patient opens eyes and tracts objects; does not follow commands ;

## 2025-01-28 NOTE — FLOWSHEET NOTE
Continues to grasp at ett tube when released for repositioning 2 point soft bilateral wrist restraints continued   
Continues to pull at ett tube when released for repositioning. 2 point bilateral soft wrist restraints continued.  
Patient attempting to pull at lines/tubes despite verbal redirection.     01/20/25 0917   Restraint Order   Length of Order (Only Document With Each New Order) Continuous Until 2359 of Next Calendar Day   Attending Physician Notified (Only Document With Each New Order) Yes   Order Upon Application (Only Document With Each New Order) Yes   NV Length of Order 38.72   Restraint Type   Non-Violent Restraint Type from Order question Soft Restraint Bilateral Wrist   Soft Restraint Bilateral Wrist CONTINUED   Assessment   Less Restrictive Alternative 1:1 patient care;Diversional activities;Review medication (side effects);Pain management;Alarm   Special Consideration/Risk Factors None   Justification from Order   Clinical Justification Pulling lines tubes dressing equipment   Education   Discontinuation Criteria Absence of behavior that required restraint   Criteria Explained Yes   Patient's Response No evidence of learning   Family Notification Already completed   Restraint  Monitoring Q2 Hours   Continued Justification  Continues to meet order criteria   Visual/Safety Check  Agitated/Restless   Circulation No signs of injury   Range of Motion Performed   Fluids NPO   Food/Meal Meal/Enteral feeding   Elimination Yes  (external catheter)       
Patient attempting to pull lines/tubes despite verbal redirection.   01/21/25 0925   Restraint Order   Length of Order (Only Document With Each New Order) Continuous Until 2359 of Next Calendar Day   Attending Physician Notified (Only Document With Each New Order) Yes   Order Upon Application (Only Document With Each New Order) Yes   NV Length of Order 38.58   Restraint Type   Non-Violent Restraint Type from Order question Soft Restraint Bilateral Wrist   Soft Restraint Bilateral Wrist CONTINUED   Assessment   Less Restrictive Alternative Diversional activities;Review medication (side effects);Pain management;Alarm   Special Consideration/Risk Factors None   Justification from Order   Clinical Justification Pulling lines tubes dressing equipment   Education   Discontinuation Criteria Absence of behavior that required restraint   Criteria Explained Yes   Patient's Response No evidence of learning   Family Notification Already completed   Restraint  Monitoring Q2 Hours   Continued Justification  Continues to meet order criteria   Visual/Safety Check  Subdued   Circulation No signs of injury   Range of Motion Performed   Fluids NPO   Food/Meal Meal/Enteral feeding   Elimination Yes   Reason Patient did not Eliminate Other (Comment)  (external cathetere)   Care Plan Interventions   Remains free of injury from restraints (restraint for interference with medical device) Determine that other, less restrictive measures have been tried or would not be effective before applying the restraint;Every 2 hours: Monitor safety, psychosocial status, comfort, nutrition and hydration;Inform patient/family regarding the reason for restraint;Evaluate the patient's condition at the time of restraint application       
Patient attempts to pull at ETT and lines when left unrestrained. Order for soft bilateral wrist restraints continued.  
Patient continue need for soft bilateral wrist restraints d/t when patient left unrestrained patient reached for ETT and other life sustaining lines and tubing. Clinical justification continues. Attending aware.   
Patient reaches for trach when left unrestrained. Order for soft bilateral wrist restraints continued.  
Pt continues to pull at lines and drains when unrestrained  
Pt continues to pull at lines and drains when unrestrained.  
Pt continues to pull at lines and drains when unrestrained.   
on the discharge service for the patient. I have reviewed and made amendments to the documentation where necessary.

## 2025-01-28 NOTE — PROGRESS NOTES
CRITICAL CARE PROGRESS NOTE      [facility]  Department of Pulmonary, Critical Care and Sleep Medicine  [facility]  Dr. Mcmillan, Dr. Mooney, Dr. Gonzalez    SUBJECTIVE:    Patient seen and examined at bedside.  No acute events overnight. Opens eyes to voice and tracks around room.  Status post tracheostomy and PEG tube placement yesterday.  Required pressure support of 12 for weaning today    OBJECTIVE:  Vitals:    01/28/25 0500 01/28/25 0535 01/28/25 0600 01/28/25 0755   BP: (!) 111/55  (!) 118/51    Pulse: 57  54 62   Resp: 12  12 18   Temp:       TempSrc:       SpO2: 100%  100% 100%   Weight:  82.4 kg (181 lb 10.5 oz)     Height:           1. General: Eyes open, ill appearing  2. Eyes: Vision - grossly normal, PERRLA   3. HENT: Head is atraumatic & normocephalic. Neck Supple, No jugular venous distention   4. Respiratory: Lungs are clear to auscultation, few scattered rales bilaterally.  5. Cardiovascular: S1, S2 normal, Regular rate & rhythm, No murmur, No pedal edema   6. Gastrointestinal: Soft, Non-tender, Non-distended, Normal bowel sounds. No organomegaly.  7. Neurologic: Opens eyes and tracks. Does not follow commands for me  8. Skin: no rashes, breakdown  9. Musculoskeletal: no LE edema, no joint effusion.  10. Psychiatric - No Anxiety, Depression    DATA:    CBC:   Lab Results   Component Value Date    WBC 6.9 01/28/2025    HGB 7.9 (L) 01/28/2025    HCT 24.4 (L) 01/28/2025    MCV 98.0 01/28/2025     01/28/2025     LFTs:   Lab Results   Component Value Date    ALT 11 01/28/2025    AST 19 01/28/2025    ALKPHOS 131 (H) 01/28/2025    BILITOT 0.8 01/28/2025    ALBUMIN 3.3 (L) 01/28/2025     Coags:   Lab Results   Component Value Date    INR 1.2 01/27/2025       RADIOLOGY:      XR CHEST PORTABLE    Result Date: 1/27/2025  EXAMINATION: ONE XRAY VIEW OF THE CHEST 1/27/2025 8:06 am COMPARISON: 01/26/2025 chest radiograph. HISTORY: 
       [facility] Hospitalist Progress Note    SYNOPSIS: Patient admitted on 2025 for Respiratory arrest (HCC)  74 y.o. female with d/o dementia who presented to [facility] with altered mental status left-sided weakness seizure-like activity and choking episode.  She was intubated by EMS prior to arrival.  Blood gas of pH of 7.27 pCO2 57 and pO2 of 237.2.  Chest x-ray concerning for interstitial opacities concerning for aspiration.  She was given Zosyn. Also of note on presentation she was hypotensive blood pressure of 74/45 tachypneic.  In the 40s temperature 93.7.  She was intubated by EMS  In the emergency department, endotracheal tube was exchanged which at that time food debris was suctioned from the airway. Patient became bradycardic without pulses, received 1 round of ACLS with 1 dose of epinephrine. ROSC was obtained and patient remained hypotensive and was initiated on Levophed IV drip   CTA was suggestive of aspiration in all lobes, worse on the right base.     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 No other overnight issues reported.   Patient seen and examined  Records reviewed.           Temp (24hrs), Av °F (37.2 °C), Min:97 °F (36.1 °C), Max:99.7 °F (37.6 °C)    DIET: Diet NPO  ADULT TUBE FEEDING; Orogastric; Standard with Fiber; Continuous; 10; Yes; 10; Q 4 hours; 40; 180; Q 4 hours; Protein; 1 Dose; Daily  CODE: Full Code    Intake/Output Summary (Last 24 hours) at 2025 1646  Last data filed at 2025 1400  Gross per 24 hour   Intake 1427 ml   Output 702 ml   Net 725 ml       Review of Systems  Could not be obtained      OBJECTIVE:    BP (!) 107/58   Pulse 55   Temp 99.2 °F 
       [facility] Hospitalist Progress Note    SYNOPSIS: Patient admitted on 2025 for Respiratory arrest (HCC)  74 y.o. female with d/o dementia who presented to [facility] with altered mental status left-sided weakness seizure-like activity and choking episode.  She was intubated by EMS prior to arrival.  Blood gas of pH of 7.27 pCO2 57 and pO2 of 237.2.  Chest x-ray concerning for interstitial opacities concerning for aspiration.  She was given Zosyn. Also of note on presentation she was hypotensive blood pressure of 74/45 tachypneic.  In the 40s temperature 93.7.  She was intubated by EMS  In the emergency department, endotracheal tube was exchanged which at that time food debris was suctioned from the airway. Patient became bradycardic without pulses, received 1 round of ACLS with 1 dose of epinephrine. ROSC was obtained and patient remained hypotensive and was initiated on Levophed IV drip   CTA was suggestive of aspiration in all lobes, worse on the right base.     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 No other overnight issues reported.   Patient seen and examined  Records reviewed.           Temp (24hrs), Av.4 °F (37.4 °C), Min:98.6 °F (37 °C), Max:99.9 °F (37.7 °C)    DIET: Diet NPO  ADULT TUBE FEEDING; Orogastric; Standard with Fiber; Continuous; 10; Yes; 10; Q 4 hours; 40; 30; Q 4 hours  CODE: Full Code    Intake/Output Summary (Last 24 hours) at 1/15/2025 1621  Last data filed at 1/15/2025 1358  Gross per 24 hour   Intake 669.31 ml   Output 650 ml   Net 19.31 ml       Review of Systems  Could not be obtained      OBJECTIVE:    BP (!) 107/46   Pulse 93   Temp 98.6 °F (37 °C) (Esophageal)   Resp 21   Ht 1.626 m (5' 4\")   Wt 86.1 kg (189 lb 13.1 oz)   SpO2 99%   BMI 32.58 kg/m²     General 
       [facility] Hospitalist Progress Note    SYNOPSIS: Patient admitted on 2025 for Respiratory arrest (HCC)  74 y.o. female with d/o dementia who presented to [facility] with altered mental status left-sided weakness seizure-like activity and choking episode.  She was intubated by EMS prior to arrival.  Blood gas of pH of 7.27 pCO2 57 and pO2 of 237.2.  Chest x-ray concerning for interstitial opacities concerning for aspiration.  She was given Zosyn. Also of note on presentation she was hypotensive blood pressure of 74/45 tachypneic.  In the 40s temperature 93.7.  She was intubated by EMS  In the emergency department, endotracheal tube was exchanged which at that time food debris was suctioned from the airway. Patient became bradycardic without pulses, received 1 round of ACLS with 1 dose of epinephrine. ROSC was obtained and patient remained hypotensive and was initiated on Levophed IV drip   CTA was suggestive of aspiration in all lobes, worse on the right base.     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 No other overnight issues reported.   Patient seen and examined  Records reviewed.           Temp (24hrs), Av.1 °F (37.3 °C), Min:98.8 °F (37.1 °C), Max:99.7 °F (37.6 °C)    DIET: Diet NPO  ADULT TUBE FEEDING; Orogastric; Standard with Fiber; Continuous; 10; Yes; 10; Q 4 hours; 40; 180; Q 4 hours; Protein; 1 Dose; Daily  CODE: Full Code    Intake/Output Summary (Last 24 hours) at 2025 1526  Last data filed at 2025 1200  Gross per 24 hour   Intake 1947.5 ml   Output 200 ml   Net 1747.5 ml       Review of Systems  Could not be obtained      OBJECTIVE:    BP (!) 117/42   Pulse 59   Temp 98.8 °F (37.1 °C) (Esophageal)   Resp 13   Ht 1.626 m (5' 4\")   Wt 77.9 kg 
       [facility] Hospitalist Progress Note    SYNOPSIS: Patient admitted on 2025 for Respiratory arrest (HCC)  74 y.o. female with d/o dementia who presented to [facility] with altered mental status left-sided weakness seizure-like activity and choking episode.  She was intubated by EMS prior to arrival.  Blood gas of pH of 7.27 pCO2 57 and pO2 of 237.2.  Chest x-ray concerning for interstitial opacities concerning for aspiration.  She was given Zosyn. Also of note on presentation she was hypotensive blood pressure of 74/45 tachypneic.  In the 40s temperature 93.7.  She was intubated by EMS  In the emergency department, endotracheal tube was exchanged which at that time food debris was suctioned from the airway. Patient became bradycardic without pulses, received 1 round of ACLS with 1 dose of epinephrine. ROSC was obtained and patient remained hypotensive and was initiated on Levophed IV drip   CTA was suggestive of aspiration in all lobes, worse on the right base.     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;   -tube feeding started;   -pt follows simple commands; still intubated; undergoing sbt; off pressors  SUBJECTIVE:  Seen in intensive care unit  Remains intubated            Temp (24hrs), Av.6 °F (37 °C), Min:98.1 °F (36.7 °C), Max:100 °F (37.8 °C)    DIET: Diet NPO  ADULT TUBE FEEDING; Orogastric; Standard without Fiber; Continuous; 10; Yes; 10; Q 4 hours; 40; 180; Q 4 hours; Protein; 1 Dose; Daily  CODE: Full Code    Intake/Output Summary (Last 24 hours) at 2025 1156  Last data filed at 2025 1025  Gross per 24 hour   Intake 3627.28 ml   Output --   Net 3627.28 ml       Review of Systems  Could not be obtained      OBJECTIVE:    BP (!) 94/50   Pulse 75   Temp 100 °F (37.8 °C) (Esophageal)   Resp 17   Ht 1.626 m 
       [facility] Hospitalist Progress Note    SYNOPSIS: Patient admitted on 2025 for Respiratory arrest (HCC)  74 y.o. female with d/o dementia who presented to [facility] with altered mental status left-sided weakness seizure-like activity and choking episode.  She was intubated by EMS prior to arrival.  Blood gas of pH of 7.27 pCO2 57 and pO2 of 237.2.  Chest x-ray concerning for interstitial opacities concerning for aspiration.  She was given Zosyn. Also of note on presentation she was hypotensive blood pressure of 74/45 tachypneic.  In the 40s temperature 93.7.  She was intubated by EMS  In the emergency department, endotracheal tube was exchanged which at that time food debris was suctioned from the airway. Patient became bradycardic without pulses, received 1 round of ACLS with 1 dose of epinephrine. ROSC was obtained and patient remained hypotensive and was initiated on Levophed IV drip   CTA was suggestive of aspiration in all lobes, worse on the right base.     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 No other overnight issues reported.   Patient seen and examined  Records reviewed.           Temp (24hrs), Av.4 °F (37.4 °C), Min:98.8 °F (37.1 °C), Max:99.9 °F (37.7 °C)    DIET: Diet NPO  ADULT TUBE FEEDING; Orogastric; Standard with Fiber; Continuous; 10; Yes; 10; Q 4 hours; 40; 180; Q 4 hours; Protein; 1 Dose; Daily  CODE: Full Code    Intake/Output Summary (Last 24 hours) at 2025 1331  Last data filed at 2025 1309  Gross per 24 hour   Intake 1907.79 ml   Output 950 ml   Net 957.79 ml       Review of Systems  Could not be obtained      OBJECTIVE:    BP (!) 97/50   Pulse 55   Temp 
       [facility] Hospitalist Progress Note    SYNOPSIS: Patient admitted on 2025 for Respiratory arrest (HCC)  74 y.o. female with d/o dementia who presented to [facility] with altered mental status left-sided weakness seizure-like activity and choking episode.  She was intubated by EMS prior to arrival.  Blood gas of pH of 7.27 pCO2 57 and pO2 of 237.2.  Chest x-ray concerning for interstitial opacities concerning for aspiration.  She was given Zosyn. Also of note on presentation she was hypotensive blood pressure of 74/45 tachypneic.  In the 40s temperature 93.7.  She was intubated by EMS  In the emergency department, endotracheal tube was exchanged which at that time food debris was suctioned from the airway. Patient became bradycardic without pulses, received 1 round of ACLS with 1 dose of epinephrine. ROSC was obtained and patient remained hypotensive and was initiated on Levophed IV drip   CTA was suggestive of aspiration in all lobes, worse on the right base.     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 No other overnight issues reported.   Patient seen and examined  Records reviewed.           Temp (24hrs), Av.2 °F (37.3 °C), Min:99 °F (37.2 °C), Max:99.5 °F (37.5 °C)    DIET: Diet NPO  ADULT TUBE FEEDING; Orogastric; Standard with Fiber; Continuous; 10; Yes; 10; Q 4 hours; 40; 30; Q 4 hours; Protein; 1 Dose; Daily  CODE: Full Code    Intake/Output Summary (Last 24 hours) at 2025 1340  Last data filed at 2025 0800  Gross per 24 hour   Intake 1131.41 ml   Output 250 ml   Net 881.41 ml       Review of Systems  Could not be obtained      OBJECTIVE:    BP (!) 102/55   Pulse 76   Temp 99.1 °F (37.3 °C) (Esophageal)   Resp 14   Ht 1.626 m (5' 
       [facility] Hospitalist Progress Note    SYNOPSIS: Patient admitted on 2025 for Respiratory arrest (HCC)  74 y.o. female with d/o dementia who presented to [facility] with altered mental status left-sided weakness seizure-like activity and choking episode.  She was intubated by EMS prior to arrival.  Blood gas of pH of 7.27 pCO2 57 and pO2 of 237.2.  Chest x-ray concerning for interstitial opacities concerning for aspiration.  She was given Zosyn. Also of note on presentation she was hypotensive blood pressure of 74/45 tachypneic.  In the 40s temperature 93.7.  She was intubated by EMS  In the emergency department, endotracheal tube was exchanged which at that time food debris was suctioned from the airway. Patient became bradycardic without pulses, received 1 round of ACLS with 1 dose of epinephrine. ROSC was obtained and patient remained hypotensive and was initiated on Levophed IV drip   CTA was suggestive of aspiration in all lobes, worse on the right base.     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 No other overnight issues reported.   Patient seen and examined  Records reviewed.           Temp (24hrs), Av °F (36.1 °C), Min:93.7 °F (34.3 °C), Max:99.9 °F (37.7 °C)    DIET: Diet NPO  CODE: Full Code    Intake/Output Summary (Last 24 hours) at 2025 1359  Last data filed at 2025 1300  Gross per 24 hour   Intake 963.44 ml   Output 700 ml   Net 263.44 ml       Review of Systems  Could not be obtained      OBJECTIVE:    BP (!) 94/51   Pulse 90   Temp 99.9 °F (37.7 °C) (Esophageal)   Resp 14   Wt 78.9 kg (173 lb 15.1 oz)   SpO2 100%   BMI 29.86 kg/m²     General appearance:  intubated and sedated  HEENT:  Conjunctivae/corneas clear.   Neck: Supple. No jugular venous distention. 
     INPATIENT CARDIOLOGY CONSULT     Reason for Consult: Status post cardiac arrest, bradycardia and shock    Cardiologist: Dr. Mckenna    Requesting Physician:  Dr. Jimenez    Date of Consultation: 1/15/2025    HISTORY OF PRESENT ILLNESS:   Jimena Rodriguez is a 74-year-old female who is new to [facility] cardiology physicians.  See past medical history below.    Please note the following information was obtained from the patient's  who is currently at the bedside.  Patient is intubated and has a history of dementia.    Interim:  No significant arrhythmias overnight  Monitor closely on telemetry  Echo shows EF of 60 to 65%    PAST MEDICAL/SURGICAL HISTORY:    Hypertension  Dementia -currently residing in a memory nursing facility  BMI 29.86kg/m2   Lifelong non-smoker  History of cholecystectomy and hysterectomy  Recently bedbound since 11/2024.    PAST SURGICAL HISTORY:    Past Surgical History:   Procedure Laterality Date    CHOLECYSTECTOMY      HYSTERECTOMY (CERVIX STATUS UNKNOWN)      SKIN BIOPSY         HOME MEDICATIONS:  Prior to Admission medications    Medication Sig Start Date End Date Taking? Authorizing Provider   triamterene-hydroCHLOROthiazide (DYAZIDE) 37.5-25 MG per capsule Take 1 capsule by mouth every morning    ProviderGaudencio MD   magnesium hydroxide (MILK OF MAGNESIA) 400 MG/5ML suspension Take 30 mLs by mouth daily as needed for Constipation    ProviderGaudencio MD   memantine (NAMENDA) 10 MG tablet Take 1 tablet by mouth 2 times daily    Gaudencio Betancourt MD   acetaminophen (TYLENOL) 325 MG tablet Take 2 tablets by mouth every 6 hours as needed for Pain    ProviderGaudencio MD   divalproex (DEPAKOTE SPRINKLES) 125 MG DR capsule Take 1 capsule by mouth 2 times daily 12/14/23   Kari Reyna DO   miconazole (MICOTIN) 2 % powder Apply topically 2 times daily. 12/14/23   Kari Reyna DO   donepezil (ARICEPT) 10 MG tablet Take 1 tablet by mouth nightly    Provider 
   01/17/25 1228   [facility] Vent Information   Ventilator ID [id number]   Vent Type [id number]   Vent Mode (S)  AC/VC   Vt (Set, mL) (S)  450 mL   Vt (Measured) 491 mL   Resp Rate (Set) (S)  12 bpm   Rate Measured 12 br/min   Minute Volume (L/min) 5.82 Liters   Peak Flow 50 L/min   FiO2  40 %   Peak Inspiratory Pressure (cmH2O) 25 cmH2O   I:E Ratio 1:2.50   Sensitivity 2   PEEP/CPAP (cmH2O) 8   Mean Airway Pressure (cmH2O) 12 cmH20   Additional Respiratory Assessments   Pulse 59   Respirations 13   SpO2 100 %   Vent Alarm Settings   High Pressure (cmH2O) 45 cmH2O   Low Exhaled Vt (ml) 0 mL   Patient Observation   Patient Observations (S)  Back to AC D/T APNEA       
   01/20/25 0919   Patient Observation   Pulse 59   Respirations 26   SpO2 99 %   Vent Information   Vent Mode CPAP/PS   Ventilator Settings   FiO2  40 %   PEEP/CPAP (cmH2O) 5   Pressure Support (cm H2O) 8 cm H2O   Vent Patient Data (Readings)   Vt (Measured) 173 mL   Peak Inspiratory Pressure (cmH2O) 13 cmH2O   Rate Measured 26 br/min   Minute Volume (L/min) 5.12 Liters   Mean Airway Pressure (cmH2O) 8 cmH20   Plateau Pressure (cm H2O) 25 cm H2O   Driving Pressure 20   I:E Ratio 1:1.60   Vent Alarm Settings   High Pressure (cmH2O) 45 cmH2O   Low Exhaled Vt (ml) 0 mL   B: Both Spontaneous Awakening and Breathing Trials   Did Patient Receive Sedative and/or Opioid IV Medications in the Last 24 Hours No   Was Patient Receiving Mechanical Ventilation Yes   Safety Screening Spontaneous Breathing Trial (SBT) Proceed with SBT - no exclusion criteria met   RT Notified Ready for SBT Yes     Sbt started   
   01/23/25 2047   Patient Observation   Pulse 70   Respirations 22   SpO2 100 %   Vent Information   Equipment Changed Expiratory Filter;Humidification   Vent Mode AC/VC   Ventilator Settings   FiO2  40 %   Vt (Set, mL) 450 mL   Resp Rate (Set) 12 bpm   PEEP/CPAP (cmH2O) 8   Peak Inspiratory Flow (Set) 50 L/sec   Vent Patient Data (Readings)   Vt (Measured) 582 mL   Peak Inspiratory Pressure (cmH2O) 28 cmH2O   Rate Measured 15 br/min   Minute Volume (L/min) 7.15 Liters   Peak Inspiratory Flow (lpm) 50 L/sec   Mean Airway Pressure (cmH2O) 14 cmH20   Plateau Pressure (cm H2O) 22 cm H2O   Driving Pressure 14   I:E Ratio 1:4.10   Flow Sensitivity 3 L/min   Static Compliance (L/cm H2O) 29   Backup Apnea On   Backup Rate 12 Breaths Per Minute   Backup Vt 450   Vent Alarm Settings   High Pressure (cmH2O) 50 cmH2O   Low Minute Volume (lpm) 4.5 L/min   High Minute Volume (lpm) 20 L/min   Low Exhaled Vt (ml) 250 mL   High Exhaled Vt (ml) 700 mL   RR High (bpm) 35 br/min   Apnea (secs) 20 secs   Additional Respiratoray Assessments   Humidification Source Heated wire   Humidification Temp 37   Circuit Condensation Drained   Ambu Bag With Mask At Bedside Yes   ETT    Placement Date/Time: 01/13/25 1800   Placed By: In ED  Placement Verified By: Auscultation;Chest X-ray;Capnometry  Preoxygenation: Yes  Technique: Video laryngoscopy  Airway Type: Cuffed  Airway Tube Size: 7.5 mm  Laryngoscope: C-Mac  Location: Oral  ...   Secured At 23 cm   Measured From Lips   Secured By Commercial tube graham   Site Assessment Dry       
   01/24/25 1334   Vent Information   Vent Mode CPAP/PS   Ventilator Settings   FiO2  40 %   PEEP/CPAP (cmH2O) 8   Pressure Support (cm H2O) 10 cm H2O     Decreased PSV to 10 at this tme  
   01/27/25 0855   Ventilator Settings   FiO2  (S)  100 %   Vt (Set, mL) 450 mL   Resp Rate (Set) 12 bpm   PEEP/CPAP (cmH2O) 8     Placed on 100% for trach procedure.  
  OCCUPATIONAL THERAPY INITIAL EVALUATION    [facility]  [address], [place]       Date:2025                                                               Patient Name: Jimena Rodriguez  MRN: [id number]  : 1950  Room: [address] [place], [place] [address]    Evaluating OT: Dottie Samuels, OTD,  OTR/L; [id number]    Referring Provider: Naomie Penaloza APRN - CNP    Specific Provider Orders/Date: OT eval and treat (25)       Diagnosis: Cardiac arrest [I46.9]  Respiratory arrest (HCC) [R09.2]  Aspiration into airway, initial encounter [T17.648A]     Reason for admission: Pt admitted with respiratory arrest, baseline dementia, aspiration.    Surgery/Procedures: None this admission     Pertinent Medical History:   Past Medical History:   Diagnosis Date    Hypertension         *Precautions:   Falls, Vent via ETT, OGT, Spastic paraparesis, 2 pt soft restraint, Alarms     Per neurology:  \"spastic paraparesis, likely from severe cervical spondylosis\" & \"recommend imaging studies of the cervical spine and neurosurgical evaluation, pending those results\"   -- Discussed with Dr. Duarte prior to session and states pt OK for light activity    Assessment of current deficits   [x] Functional mobility  [x]ADLs  [x] Strength               [x]Cognition   [x] Functional transfers   [x] IADLs         [x] Safety Awareness   [x]Endurance   [x] Fine Coordination        [x] ROM     [] Vision/perception   []Sensation    [x]Gross Motor Coordination [x] Balance   [x] Delirium                  [x]Motor Control     [x] Communication    OT PLAN OF CARE   OT POC based on physician orders, patient diagnosis and results of clinical assessment.       Frequency/Duration: 1-3 days/wk for 1-2 weeks PRN    Specific OT Treatment Interventions to include:   * Instruction/training on adapted ADL techniques and AE recommendations to increase functional independence within precautions       * 
  OCCUPATIONAL THERAPY TREATMENT SESSION  [facility]  [address], [place]       Date:2025                                                               Patient Name: Jimena Rodriguez  MRN: [id number]  : 1950  Room: [address] [place], [place] [address]    Evaluating OT: Dottie Samuels, OTD,  OTR/L; [id number]    Referring Provider: Naomie Penaloza APRN - CNP    Specific Provider Orders/Date: OT eval and treat (25)       Diagnosis: Cardiac arrest [I46.9]  Respiratory arrest (HCC) [R09.2]  Aspiration into airway, initial encounter [T17.358A]     Reason for admission: Pt admitted with respiratory arrest, baseline dementia, aspiration.    Surgery/Procedures: None this admission     Pertinent Medical History:   Past Medical History:   Diagnosis Date    Hypertension         *Precautions:   Falls, Vent via ETT, OGT, Spastic paraparesis, 2 pt soft restraint, Alarms     Per neurology:  \"spastic paraparesis, likely from severe cervical spondylosis\" & \"recommend imaging studies of the cervical spine and neurosurgical evaluation, pending those results\"   -- Discussed with Dr. Duarte prior to session and states pt OK for light activity    Assessment of current deficits   [x] Functional mobility  [x]ADLs  [x] Strength               [x]Cognition   [x] Functional transfers   [x] IADLs         [x] Safety Awareness   [x]Endurance   [x] Fine Coordination        [x] ROM     [] Vision/perception   []Sensation    [x]Gross Motor Coordination [x] Balance   [x] Delirium                  [x]Motor Control     [x] Communication    OT PLAN OF CARE   OT POC based on physician orders, patient diagnosis and results of clinical assessment.       Frequency/Duration: 1-3 days/wk for 1-2 weeks PRN    Specific OT Treatment Interventions to include:   * Instruction/training on adapted ADL techniques and AE recommendations to increase functional independence within precautions       * 
  OCCUPATIONAL THERAPY TREATMENT SESSION  [facility]  [address], [place]       Date:2025                                                               Patient Name: Jimena Rodriguez  MRN: [id number]  : 1950  Room: [address] [place], [place] [address]    Evaluating OT: Dottie Samuels, OTD,  OTR/L; [id number]    Referring Provider: Naomie Penaloza APRN - CNP    Specific Provider Orders/Date: OT eval and treat (25)       Diagnosis: Cardiac arrest [I46.9]  Respiratory arrest (HCC) [R09.2]  Aspiration into airway, initial encounter [T17.698A]     Reason for admission: Pt admitted with respiratory arrest, baseline dementia, aspiration.    Surgery/Procedures: None this admission     Pertinent Medical History:   Past Medical History:   Diagnosis Date    Hypertension         *Precautions:   Falls, trach to vent, NGT, Spastic paraparesis, 2 pt soft restraint, Alarms     Per neurology:  \"spastic paraparesis, likely from severe cervical spondylosis\" & \"recommend imaging studies of the cervical spine and neurosurgical evaluation, pending those results\"   -- Discussed with Dr. Duarte prior to session and states pt OK for light activity    Assessment of current deficits   [x] Functional mobility  [x]ADLs  [x] Strength               [x]Cognition   [x] Functional transfers   [x] IADLs         [x] Safety Awareness   [x]Endurance   [x] Fine Coordination        [x] ROM     [] Vision/perception   []Sensation    [x]Gross Motor Coordination [x] Balance   [x] Delirium                  [x]Motor Control     [x] Communication    OT PLAN OF CARE   OT POC based on physician orders, patient diagnosis and results of clinical assessment.       Frequency/Duration: 1-3 days/wk for 1-2 weeks PRN    Specific OT Treatment Interventions to include:   * Instruction/training on adapted ADL techniques and AE recommendations to increase functional independence within precautions       * 
  OT consult received and appreciated. Chart reviewed, discussed at AM rounds and consulted RN. Pt is not appropriate for occupational therapy at this time- pt does not follow commands. Will evaluate at a later time.     Dottie Samuels, OTR/L # [id number]     
  OT consult received and appreciated. Chart reviewed, discussed at AM rounds and consulted RN. Pt is not appropriate for occupational therapy services at this time- pt does not follow commands. Will evaluate at a later time.     Dottie Samuels, OTR/L # [id number]     
  OT consult received and appreciated. Chart reviewed, discussed at AM rounds and consulted RN. Pt is not appropriate for occupational therapy session at this time. Will evaluate at a later time.     Dottie Samuels, OTR/L # [id number]     
  OT consult received and appreciated. Chart reviewed, discussed at AM rounds and consulted RN. Pt scheduled for trach/peg this date. Will evaluate at a later time.     Dottie Samuels, OTR/L # [id number]     
  P Quality Flow/Interdisciplinary Rounds Progress Note        Quality Flow Rounds held on January 18, 2025    Disciplines Attending:  Nursing Unit Leadership    Jimena Rodriguez was admitted on 1/13/2025  5:41 PM    Anticipated Discharge Date:       Disposition:       Charbel Score:  Charbel Scale Score: 12    [facility] RISK OF UNPLANNED READMISSION 2.0             16.2 Total Score        Discussed patient goal for the day, patient clinical progression, and barriers to discharge.  The following Goal(s) of the Day/Commitment(s) have been identified:  wean off ventilator as tolerated SBT       Dheeraj Lopez RN  January 18, 2025       
  P Quality Flow/Interdisciplinary Rounds Progress Note        Quality Flow Rounds held on January 19, 2025    Disciplines Attending:  Nursing Unit Leadership    Jimena Rodriguez was admitted on 1/13/2025  5:41 PM    Anticipated Discharge Date:       Disposition:       Charbel Score:  Charbel Scale Score: 13    [facility] RISK OF UNPLANNED READMISSION 2.0             16.6 Total Score        Discussed patient goal for the day, patient clinical progression, and barriers to discharge.  The following Goal(s) of the Day/Commitment(s) have been identified: wean off ventilator as tolerated     Dheeraj Lopez RN  January 19, 2025       
  Palliative Care Department  [phone]  Palliative Care Progress Note  Provider CHAY Nguyen CNP    Jimena Rodriguez  [id number]  Hospital Day: 12  Date of Initial Consult: 1/19/2025  Referring Provider: MADELEINE Gray  Palliative Medicine was consulted for assistance with: Goals of care    HPI:   Jimena Rodriguez is a 74 y.o. with a medical history of hypertension who was admitted on 1/13/2025 from home with a CHIEF COMPLAINT of altered mental status, left-sided weakness/seizure activity and choking episode.  Patient was intubated by EMS prior to arrival.  CXR concerning for interstitial opacity concerning for aspiration.  Patient was started on Levophed and admitted to ICU for further medical management.  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  1/14 s/p bronch.  1/16 tube feeds started.   ASSESSMENT/PLAN:     Pertinent Hospital Diagnoses     Septic shock  Acute hypoxic hypercapnic respiratory failure  COVID-19  Aspiration pneumonia  Dementia    Palliative Care Encounter / Counseling Regarding Goals of Care  Please see detailed goals of care discussion as below  At this time, Jimena Rodriguez, Does Not have capacity for medical decision-making.  Capacity is time limited and situation/question specific  During encounter Jorge Alberto was surrogate medical decision-maker  Outcome of goals of care meeting:    would like to wait for his brother-in-law to be at the bedside this evening, before making a decision, he does not believe she would have wanted to remain attached to life support long-term    Code status DNR-CCA  Advanced Directives: no POA or living will in [facility]  Surrogate/Legal NOK:  Jorge Alberto Rodriguez (spouse) [phone]    Spiritual assessment: no spiritual distress identified  Bereavement and grief: to be determined  Referrals to: none today  SUBJECTIVE:     Current 
  Palliative Care Department  [phone]  Palliative Care Initial Consult  Provider CHAY Nguyen CNP    Jimena Rodriguez  [id number]  Hospital Day: 11  Date of Initial Consult: 1/19/2025  Referring Provider: MADELEINE Gray  Palliative Medicine was consulted for assistance with: Goals of care    HPI:   Jimena Rodriguez is a 74 y.o. with a medical history of hypertension who was admitted on 1/13/2025 from home with a CHIEF COMPLAINT of altered mental status, left-sided weakness/seizure activity and choking episode.  Patient was intubated by EMS prior to arrival.  CXR concerning for interstitial opacity concerning for aspiration.  Patient was started on Levophed and admitted to ICU for further medical management.  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  1/14 s/p bronch.  1/16 tube feeds started.   ASSESSMENT/PLAN:     Pertinent Hospital Diagnoses     Septic shock  Acute hypoxic hypercapnic respiratory failure  COVID-19  Aspiration pneumonia  Dementia    Palliative Care Encounter / Counseling Regarding Goals of Care  Please see detailed goals of care discussion as below  At this time, Jimena Rodriguez, Does Not have capacity for medical decision-making.  Capacity is time limited and situation/question specific  During encounter Jorge Alberto was surrogate medical decision-maker  Outcome of goals of care meeting:   Family discussing goals of care  CODE STATUS discussed and changed to DNR CCA  Code status DNR-CCA  Advanced Directives: no POA or living will in [facility]  Surrogate/Legal NOK:  Jorge Alberto Rodriguez (spouse) [phone]    Spiritual assessment: no spiritual distress identified  Bereavement and grief: to be determined  Referrals to: none today  SUBJECTIVE:     Current medical issues leading to Palliative Medicine involvement include   Active Hospital Problems    Diagnosis Date Noted    
CHG single lumen power midline catheter Placement 1/22/2025    Product number: [id number]   Lot Number: [id number]      Ultrasound: yes   Right Basilic vein:                Upper Arm Circumference: (CM) 32    Size:(FR)/GUAGE 4.5/15 cm    Exposed Length: (CM) 3    Internal Length: (CM) 12   Cut: (CM) 0   Vein Measurement: 0.52 cm              Lidocaine Given: yes lidocaine 1% (from midline kit)                Procedure performed by AGUSTIN Payne RN  1/22/2025  12:07 PM                          
CRITICAL CARE   SERVICE DAILY PROGRESS  NOTE     1/21/2025   Hospital  LOS: 8 days      Admit date- 1/13/2025       Initially admitted for -   Choking (Per ems patient was eating and had a witnessed seizure. EMS was pacing patient and intubated prior to arrival. )       Subjective:      Intubated after a cardiac arrest , now off sedation  does not follow commands   Did failry well on SBT on 01/18  Appears weak , goes into apnea   Following simple commands intermittently   Had Low TVs on SBT , will continue to attempt with higher pressure support     Review of Systems        Unable to obtain secondary to mental status. Pt is intubated and sedated                      Allergies:  Allergies   Allergen Reactions    Pcn [Penicillins] Itching and Rash     Home Meds:  Prior to Admission medications    Medication Sig Start Date End Date Taking? Authorizing Provider   triamterene-hydroCHLOROthiazide (DYAZIDE) 37.5-25 MG per capsule Take 1 capsule by mouth every morning    Gaudencio Betancourt MD   magnesium hydroxide (MILK OF MAGNESIA) 400 MG/5ML suspension Take 30 mLs by mouth daily as needed for Constipation    ProviderGaudencio MD   memantine (NAMENDA) 10 MG tablet Take 1 tablet by mouth 2 times daily    Gaudencio Betancourt MD   acetaminophen (TYLENOL) 325 MG tablet Take 2 tablets by mouth every 6 hours as needed for Pain    Gaudencio Betancourt MD   divalproex (DEPAKOTE SPRINKLES) 125 MG DR capsule Take 1 capsule by mouth 2 times daily 12/14/23   Kari Reyna DO   miconazole (MICOTIN) 2 % powder Apply topically 2 times daily. 12/14/23   Kari Reyna DO   donepezil (ARICEPT) 10 MG tablet Take 1 tablet by mouth nightly    Gaudencio Betancourt MD   metoprolol (LOPRESSOR) 100 MG tablet Take 1 tablet by mouth every morning    Gaudencio Betancourt MD   mirtazapine (REMERON) 7.5 MG tablet Take 1 tablet by mouth nightly    Gaudencio Betancourt MD   triamterene-hydroCHLOROthiazide (DYAZIDE) 37.5-25 MG per 
CRITICAL CARE   SERVICE DAILY PROGRESS  NOTE     1/22/2025   Hospital  LOS: 9 days      Admit date- 1/13/2025       Initially admitted for -   Choking (Per ems patient was eating and had a witnessed seizure. EMS was pacing patient and intubated prior to arrival. )       Subjective:      Intubated after a cardiac arrest , now off sedation  does not follow commands   Did failry well on SBT on 01/18  Appears weak , goes into apnea   Following simple commands intermittently   Had Low TVs on SBT , will continue to attempt with higher pressure support     Review of Systems        Unable to obtain secondary to mental status. Pt is intubated and sedated                      Allergies:  Allergies   Allergen Reactions    Pcn [Penicillins] Itching and Rash     Home Meds:  Prior to Admission medications    Medication Sig Start Date End Date Taking? Authorizing Provider   triamterene-hydroCHLOROthiazide (DYAZIDE) 37.5-25 MG per capsule Take 1 capsule by mouth every morning    Gaudencio Betancourt MD   magnesium hydroxide (MILK OF MAGNESIA) 400 MG/5ML suspension Take 30 mLs by mouth daily as needed for Constipation    ProviderGaudencio MD   memantine (NAMENDA) 10 MG tablet Take 1 tablet by mouth 2 times daily    Gaudencio Betancourt MD   acetaminophen (TYLENOL) 325 MG tablet Take 2 tablets by mouth every 6 hours as needed for Pain    Gaudencio Betancourt MD   divalproex (DEPAKOTE SPRINKLES) 125 MG DR capsule Take 1 capsule by mouth 2 times daily 12/14/23   Kari Reyna DO   miconazole (MICOTIN) 2 % powder Apply topically 2 times daily. 12/14/23   Kari Reyna DO   donepezil (ARICEPT) 10 MG tablet Take 1 tablet by mouth nightly    Gaudencio Betancourt MD   metoprolol (LOPRESSOR) 100 MG tablet Take 1 tablet by mouth every morning    Gaudencio Betancourt MD   mirtazapine (REMERON) 7.5 MG tablet Take 1 tablet by mouth nightly    Gaudencio Betancourt MD   triamterene-hydroCHLOROthiazide (DYAZIDE) 37.5-25 MG per 
CRITICAL CARE   SERVICE DAILY PROGRESS  NOTE     1/23/2025   Hospital  LOS: 10 days      Admit date- 1/13/2025       Initially admitted for -   Choking (Per ems patient was eating and had a witnessed seizure. EMS was pacing patient and intubated prior to arrival. )       Subjective:      Intubated after a cardiac arrest , now off sedation  does not follow commands   Did failry well on SBT on 01/18  Appears weak , goes into apnea   Following simple commands intermittently   Had Low TVs on SBT , will continue to attempt with higher pressure support     Review of Systems        Unable to obtain secondary to mental status. Pt is intubated and sedated                      Allergies:  Allergies   Allergen Reactions    Pcn [Penicillins] Itching and Rash     Home Meds:  Prior to Admission medications    Medication Sig Start Date End Date Taking? Authorizing Provider   triamterene-hydroCHLOROthiazide (DYAZIDE) 37.5-25 MG per capsule Take 1 capsule by mouth every morning    Gaudencio Betancourt MD   magnesium hydroxide (MILK OF MAGNESIA) 400 MG/5ML suspension Take 30 mLs by mouth daily as needed for Constipation    ProviderGaudencio MD   memantine (NAMENDA) 10 MG tablet Take 1 tablet by mouth 2 times daily    Gaudencio Betancourt MD   acetaminophen (TYLENOL) 325 MG tablet Take 2 tablets by mouth every 6 hours as needed for Pain    Gaudencio Betancourt MD   divalproex (DEPAKOTE SPRINKLES) 125 MG DR capsule Take 1 capsule by mouth 2 times daily 12/14/23   Kari Reyna DO   miconazole (MICOTIN) 2 % powder Apply topically 2 times daily. 12/14/23   Kari Reyna DO   donepezil (ARICEPT) 10 MG tablet Take 1 tablet by mouth nightly    Gaudencio Betancourt MD   metoprolol (LOPRESSOR) 100 MG tablet Take 1 tablet by mouth every morning    Gaudencio Betancourt MD   mirtazapine (REMERON) 7.5 MG tablet Take 1 tablet by mouth nightly    Gaudencio Betancourt MD   triamterene-hydroCHLOROthiazide (DYAZIDE) 37.5-25 MG 
CRITICAL CARE   SERVICE DAILY PROGRESS  NOTE     1/25/2025   Hospital  LOS: 12 days      Admit date- 1/13/2025       Initially admitted for -   Choking (Per ems patient was eating and had a witnessed seizure. EMS was pacing patient and intubated prior to arrival. )       Subjective:      Intubated after a cardiac arrest , now off sedation  does not follow commands   Did failry well on SBT on 01/18  Appears weak , goes into apnea   Following simple commands intermittently   Had Low TVs on SBT , will continue to attempt with higher pressure support     Review of Systems        Unable to obtain secondary to mental status. Pt is intubated and sedated                      Allergies:  Allergies   Allergen Reactions    Pcn [Penicillins] Itching and Rash     Home Meds:  Prior to Admission medications    Medication Sig Start Date End Date Taking? Authorizing Provider   triamterene-hydroCHLOROthiazide (DYAZIDE) 37.5-25 MG per capsule Take 1 capsule by mouth every morning    Gaudencio Betancourt MD   magnesium hydroxide (MILK OF MAGNESIA) 400 MG/5ML suspension Take 30 mLs by mouth daily as needed for Constipation    ProviderGaudencio MD   memantine (NAMENDA) 10 MG tablet Take 1 tablet by mouth 2 times daily    Gaudencio Betancourt MD   acetaminophen (TYLENOL) 325 MG tablet Take 2 tablets by mouth every 6 hours as needed for Pain    Gaudencio Betancourt MD   divalproex (DEPAKOTE SPRINKLES) 125 MG DR capsule Take 1 capsule by mouth 2 times daily 12/14/23   Kari Reyna DO   miconazole (MICOTIN) 2 % powder Apply topically 2 times daily. 12/14/23   Kari Reyna DO   donepezil (ARICEPT) 10 MG tablet Take 1 tablet by mouth nightly    Gaudencio Betancourt MD   metoprolol (LOPRESSOR) 100 MG tablet Take 1 tablet by mouth every morning    Gaudencio Betancourt MD   mirtazapine (REMERON) 7.5 MG tablet Take 1 tablet by mouth nightly    Gaudencio Betancourt MD   triamterene-hydroCHLOROthiazide (DYAZIDE) 37.5-25 MG 
CRITICAL CARE   SERVICE DAILY PROGRESS  NOTE     1/26/2025   Hospital  LOS: 13 days      Admit date- 1/13/2025       Initially admitted for -   Choking (Per ems patient was eating and had a witnessed seizure. EMS was pacing patient and intubated prior to arrival. )       Subjective:      Intubated after a cardiac arrest , now off sedation  does not follow commands   Did failry well on SBT on 01/18  Appears weak , goes into apnea   Following simple commands intermittently   Had Low TVs on SBT , will continue to attempt with higher pressure support     Review of Systems        Unable to obtain secondary to mental status. Pt is intubated and sedated                      Allergies:  Allergies   Allergen Reactions    Pcn [Penicillins] Itching and Rash     Home Meds:  Prior to Admission medications    Medication Sig Start Date End Date Taking? Authorizing Provider   triamterene-hydroCHLOROthiazide (DYAZIDE) 37.5-25 MG per capsule Take 1 capsule by mouth every morning    Gaudencio Betancourt MD   magnesium hydroxide (MILK OF MAGNESIA) 400 MG/5ML suspension Take 30 mLs by mouth daily as needed for Constipation    ProviderGaudencio MD   memantine (NAMENDA) 10 MG tablet Take 1 tablet by mouth 2 times daily    Gaudencio Betancourt MD   acetaminophen (TYLENOL) 325 MG tablet Take 2 tablets by mouth every 6 hours as needed for Pain    Gaudencio Betancourt MD   divalproex (DEPAKOTE SPRINKLES) 125 MG DR capsule Take 1 capsule by mouth 2 times daily 12/14/23   Kari Reyna DO   miconazole (MICOTIN) 2 % powder Apply topically 2 times daily. 12/14/23   Kari Reyna DO   donepezil (ARICEPT) 10 MG tablet Take 1 tablet by mouth nightly    Gaudencio Betancourt MD   metoprolol (LOPRESSOR) 100 MG tablet Take 1 tablet by mouth every morning    Gaudencio Betancourt MD   mirtazapine (REMERON) 7.5 MG tablet Take 1 tablet by mouth nightly    Gaudencio Betancourt MD   triamterene-hydroCHLOROthiazide (DYAZIDE) 37.5-25 MG 
[facility]   IN-PATIENT SERVICE      Progress Note    1/26/2025    9:51 AM    Name:   Jimena Rodriguez  MRN:     [id number]     Acct:      [id number]   Room:   [address] [place], [place] [address]   Day:  13  Admit Date:  1/13/2025  5:41 PM    PCP:   No primary care provider on file.  Code Status:  DNR-CCA    Subjective:     C/C:   Chief Complaint   Patient presents with    Choking     Per ems patient was eating and had a witnessed seizure. EMS was pacing patient and intubated prior to arrival.      Interval History Status: not changed.     Remains intubated , off sedation     Intubated 1/13  Bronch 1/14     From SNF, Non ambulatory at baseline     Waiting for palliative meeting regarding withdrawing care      Brief History:     Patient admitted on 1/13/2025 for Respiratory arrest (HCC)  74 y.o. female with d/o dementia who presented to [facility] with altered mental status left-sided weakness seizure-like activity and choking episode.  She was intubated by EMS prior to arrival.  Blood gas of pH of 7.27 pCO2 57 and pO2 of 237.2.  Chest x-ray concerning for interstitial opacities concerning for aspiration.  She was given Zosyn. Also of note on presentation she was hypotensive blood pressure of 74/45 tachypneic.  In the 40s temperature 93.7.  She was intubated by EMS  In the emergency department, endotracheal tube was exchanged which at that time food debris was suctioned from the airway. Patient became bradycardic without pulses, received 1 round of ACLS with 1 dose of epinephrine. ROSC was obtained and patient remained hypotensive and was initiated on Levophed IV drip   CTA was suggestive of aspiration in all lobes, worse on the right base.        Review of Systems:   As per HPI     Medications:     Allergies:    Allergies   Allergen Reactions    Pcn [Penicillins] Itching and Rash       Current Meds:   Scheduled Meds:    potassium phosphate IVPB (PERIPHERAL LINE)  15 mmol IntraVENous Once    
Chart reviewed.  Patient and family decided to pursue long-term ventilation support.  She underwent trach and PEG placement this morning.  CODE STATUS has already been established DNR CCA.  Discharge planning select [facility].  Goals of care and CODE STATUS has been established with no further pain is identified.  Going to sign off for now.  Please reconsult if new PM  needs arises.  
[facility]   IN-PATIENT SERVICE      Progress Note    1/25/2025    12:15 PM    Name:   Jimena Rodriguez  MRN:     [id number]     Acct:      [id number]   Room:   [address] [place] [address]A   Day:  12  Admit Date:  1/13/2025  5:41 PM    PCP:   No primary care provider on file.  Code Status:  DNR-CCA    Subjective:     C/C:   Chief Complaint   Patient presents with    Choking     Per ems patient was eating and had a witnessed seizure. EMS was pacing patient and intubated prior to arrival.      Interval History Status: not changed.     Remains intubated , off sedation     Intubated 1/13  Bronch 1/14     From SNF, Non ambulatory at baseline     Waiting for palliative meeting regarding withdrawing care      Brief History:     Patient admitted on 1/13/2025 for Respiratory arrest (HCC)  74 y.o. female with d/o dementia who presented to [facility] with altered mental status left-sided weakness seizure-like activity and choking episode.  She was intubated by EMS prior to arrival.  Blood gas of pH of 7.27 pCO2 57 and pO2 of 237.2.  Chest x-ray concerning for interstitial opacities concerning for aspiration.  She was given Zosyn. Also of note on presentation she was hypotensive blood pressure of 74/45 tachypneic.  In the 40s temperature 93.7.  She was intubated by EMS  In the emergency department, endotracheal tube was exchanged which at that time food debris was suctioned from the airway. Patient became bradycardic without pulses, received 1 round of ACLS with 1 dose of epinephrine. ROSC was obtained and patient remained hypotensive and was initiated on Levophed IV drip   CTA was suggestive of aspiration in all lobes, worse on the right base.        Review of Systems:   As per HPI     Medications:     Allergies:    Allergies   Allergen Reactions    Pcn [Penicillins] Itching and Rash       Current Meds:   Scheduled Meds:    meropenem  1,000 mg IntraVENous Q8H    acetylcysteine  600 mg Inhalation TID 
Comprehensive Nutrition Assessment    Type and Reason for Visit:  Reassess    Nutrition Recommendations/Plan:   Continue NPO, Modify Tube Feeding (to better meet estimated nutrient needs    Standard without Fiber @ 50 ml/hr + 1 protein modular (x23 hrs holding for synthroid) to provide 1150 ml tv, 1380 kcals, 64 gm pro (1480 kcals, 90 gm pro w/ mod), 943 ml free water  Meets 100% estimated nutrient needs        Malnutrition Assessment:  Malnutrition Status:  At risk for malnutrition (01/16/25 1131)    Context:  Acute Illness     Findings of the 6 clinical characteristics of malnutrition:  Energy Intake:  Mild decrease in energy intake  Weight Loss:  Unable to assess (CBW appears elevated)     Body Fat Loss:  Unable to assess     Muscle Mass Loss:  Unable to assess    Fluid Accumulation:  No fluid accumulation     Strength:  Not Performed    Nutrition Assessment:    Pt remains intubated w/ need for EN support & ICU care. Admit from [facility] d/t seizure while eating/choking s/p intubated. Pt s/p acute cardiopulmonary arrest in ED 2/2 aspiration event. Noted septic shock, aspiration PNA. Pt s/p bronch 1/14. PMHx dementia, alzheimer's disease, HTN. Will provide updated TF recs and continue to monitor.    Nutrition Related Findings:    pt intubated/sedated, OGT w/ TF, active BS, soft abd, diarrhea, +1 generalized edema, +I/Os 12.9L Wound Type: None       Current Nutrition Intake & Therapies:    Average Meal Intake: NPO     Diet NPO  ADULT TUBE FEEDING; Orogastric; Standard without Fiber; Continuous; 10; Yes; 10; Q 4 hours; 40; 180; Q 4 hours; Protein; 1 Dose; Daily  Current Tube Feeding (TF) Orders:  Feeding Route: Orogastric  Formula: Standard without Fiber  Schedule: Continuous  Feeding Regimen: 40 ml/hr, running at goal  Additives/Modulars: Protein (once daily)  Water Flushes: 180 ml q 4 hrs = 1080 ml water  Current TF Provides: 960 ml tv, 1152 kcals, 53 gm pro (1252 kcals, 79 gm pro w/ mod), 787 ml free water, 1867 ml 
DAILY VENTILATOR WEANING ASSESSMENT PERFORMED    P/FIO2 Ratio =  284        (<100= do not Wean)                  Cs =        37                  (<32= Instability)  Plat. Pressure = 21  MV = 6.6  RSBI =    Instabilities:       Cardiovascular =       CNS =       Respiratory =       Metabolic =    Parameters    no    Wean per protocol  no    Ask Physician for a weaning plan yes    Additional Comments:     Performed by Patricia Simerlink, RCP RRT      Reference Table:    Cardiovascular     CNS      1. Mean BP less than or equal to 75   1. Neuromuscular blockade  2. Heart Rate greater than 130   2. RASS of -3, -4, -5  3. Myocardial Ischemia    3. RASS of +3, +4  4. Mechanical Assist Device    4. ICP greater than 15 or             Intracranial Hypertension         Respiratory      Metabolic  1. PEEP equal to or greater than 10cm/H20  1.Temp. (8hrs) less than 95 or > 103  2. Respiratory Rate greater than 35   2. WBC < 5000 or > 13278  3. Minute Volume greater than 15L  4. pH less than 7.30  5. Deteriorating chest X-ray        
DAILY VENTILATOR WEANING ASSESSMENT PERFORMED    P/FIO2 Ratio =  321        (<100= do not Wean)                  Cs =  29                        (<32= Instability)  Plat. Pressure = 25  MV =6.43  RSBI =    Instabilities:       Cardiovascular =       CNS =       Respiratory =       Metabolic =    Parameters    yes    Wean per protocol  no    Ask Physician for a weaning plan yes    Additional Comments: Pts RSBI on PSV 8 was between 120--130 Per Dr Alfonso increase PS and let pt work her Lungs for the day  Pt placed on PSV 12 for the day RSBI  50--75 now  Nif Done   Highest value was at -18     Performed by Hillary Castro, [facility] RRT      Reference Table:    Cardiovascular     CNS      1. Mean BP less than or equal to 75   1. Neuromuscular blockade  2. Heart Rate greater than 130   2. RASS of -3, -4, -5  3. Myocardial Ischemia    3. RASS of +3, +4  4. Mechanical Assist Device    4. ICP greater than 15 or             Intracranial Hypertension         Respiratory      Metabolic  1. PEEP equal to or greater than 10cm/H20  1.Temp. (8hrs) less than 95 or > 103  2. Respiratory Rate greater than 35   2. WBC < 5000 or > 02136  3. Minute Volume greater than 15L  4. pH less than 7.30  5. Deteriorating chest X-ray         01/20/25 0915   Patient Observation   Pulse 58   Respirations 14   SpO2 100 %   Vent Information   Ventilator Day(s) 7   Ventilator -05   Vent Mode AC/VC   Ventilator Settings   FiO2  40 %   Vt (Set, mL) 450 mL   Resp Rate (Set) 12 bpm   PEEP/CPAP (cmH2O) 8   Peak Inspiratory Flow (Set) 50 L/sec   Vent Patient Data (Readings)   Vt (Measured) 480 mL   Peak Inspiratory Pressure (cmH2O) 30 cmH2O   Rate Measured 13 br/min   Minute Volume (L/min) 6.43 Liters   Mean Airway Pressure (cmH2O) 14 cmH20   Plateau Pressure (cm H2O) 25 cm H2O   Driving Pressure 17   I:E Ratio 1:2.60   Flow Sensitivity 3 L/min   PEEP Intrinsic (cm H2O) 0 cm H2O   Static Compliance (L/cm H2O) 29   Backup Apnea On   Backup Rate 12 Breaths Per 
Date : 2025   Time:  949   Patient Name:Jimena Rodriguez  Patient :1950   Procedure:Percutaneous Gastromy tube insertion  Verify Correct Patient by two identifiers:  Yes  Verbal agreement by all team members on the procedure to be done?  Yes  Correct position?   Yes  Correct site?   Yes  Correct site and side of surgery as evidenced by markings made?    N/A  Correct implants, imaging data and/or special equipment available?    Yes   
Date : 2025  Time:  915   Patient Name:Sandy Rodriguez  Patient :1950  Procedure:Tracheostomy  Verify Correct Patient by two identifiers:  Yes  Verbal agreement by all team members on the procedure to be done?  Yes  Correct position?   Yes  Correct site?   Yes  Correct site and side of surgery as evidenced by markings made?    N/A  Correct implants, imaging data and/or special equipment available?    Yes   
EEG completed.  Report to follow.  Marcela Pena    
GENERAL SURGERY  DAILY PROGRESS NOTE    Patient's Name/Date of Birth: Jimena Rodriguez / 1950    Date: 2025     Chief Complaint   Patient presents with    Choking     Per ems patient was eating and had a witnessed seizure. EMS was pacing patient and intubated prior to arrival.         Subjective:  Pt underwent Trach/PEG yesterday. No acute issues overnight.    Objective:  Last 24Hrs  Temp  Av.1 °F (37.3 °C)  Min: 98.8 °F (37.1 °C)  Max: 99.3 °F (37.4 °C)  Resp  Av.5  Min: 12  Max: 17  Pulse  Av.8  Min: 54  Max: 86  Systolic (24hrs), Av , Min:91 , Max:141     Diastolic (24hrs), Av, Min:39, Max:64    SpO2  Av.8 %  Min: 96 %  Max: 100 %    I/O last 3 completed shifts:  In: 1465.2 [NG/GT:153; IV Piggyback:1312.2]  Out: 1200 [Urine:1200]      General: Lying in bed comfortably  Cardiovascular: RR and normotensive  Respiratory: On mechanical ventilation  Abdomen: soft, nontender, nondistended. PEG in place 4.5cm at skin  Skin: no obvious rashes or lesions appreciated, no jaundice  Extremities: moving extremities      CBC  Recent Labs     25  0416 25  0408 25  0420   WBC 7.3 7.1 6.9   RBC 2.46* 2.49* 2.49*   HGB 7.7* 7.8* 7.9*   HCT 23.6* 24.3* 24.4*   MCV 95.9 97.6 98.0   MCH 31.3 31.3 31.7   MCHC 32.6 32.1 32.4   RDW 15.6* 15.9* 15.9*    168 170   MPV 11.7 11.5 11.7       CMP  Recent Labs     25  0416 25  0408    142   K 3.6 3.5   * 107   CO2 25 24   BUN 13 13   CREATININE 0.6 0.6   GLUCOSE 127* 104*   CALCIUM 8.0* 8.5*   BILITOT 0.4 0.4   ALKPHOS 96 115*   AST 18 18   ALT 13 12         Assessment/Plan:  74 y.o. female admitted for seizures and cardiac arrest s/p Trach/PEG      - Okay to resume tube feeds   - Okay for DVT PPX   - No further plans for acute surgical intervention    Electronically signed by Aj Beckett MD on 2025 at 6:07 AM    Attending Attestation     I have seen and examined this patient.  I have 
GENERAL SURGERY  DAILY PROGRESS NOTE    Patient's Name/Date of Birth: Jimena Rodriguez / 1950    Date: 2025     Chief Complaint   Patient presents with    Choking     Per ems patient was eating and had a witnessed seizure. EMS was pacing patient and intubated prior to arrival.         Subjective:  Resting in bed. Tube feeds have been held      Objective:  Last 24Hrs  Temp  Av.1 °F (37.3 °C)  Min: 98.7 °F (37.1 °C)  Max: 99.4 °F (37.4 °C)  Resp  Av.9  Min: 12  Max: 27  Pulse  Av.1  Min: 56  Max: 83  Systolic (24hrs), Av , Min:94 , Max:122     Diastolic (24hrs), Av, Min:41, Max:59    SpO2  Av.5 %  Min: 94 %  Max: 100 %    I/O last 3 completed shifts:  In: 2896.6 [I.V.:98.7; NG/GT:1174; IV Piggyback:1623.9]  Out: 1050 [Urine:1050]      General: In no acute distress, alert and oriented x4  Cardiovascular: Warm throughout, no edema  Respiratory: no respiratory distress, equal chest rise, intubated on ventilator FiO2 40%  Abdomen: soft,  nontender, nondistended. No previous large scars  Skin: no obvious rashes or lesions appreciated, no jaundice  Extremities: moving extremities      CBC  Recent Labs     25  0406 25  0416 25  0408   WBC 7.7 7.3 7.1   RBC 2.46* 2.46* 2.49*   HGB 7.6* 7.7* 7.8*   HCT 23.6* 23.6* 24.3*   MCV 95.9 95.9 97.6   MCH 30.9 31.3 31.3   MCHC 32.2 32.6 32.1   RDW 15.9* 15.6* 15.9*    164 168   MPV 11.9 11.7 11.5       CMP  Recent Labs     25  0406 25  0416 25  0408    141 142   K 3.4* 3.6 3.5   * 108* 107   CO2 26 25 24   BUN 11 13 13   CREATININE 0.7 0.6 0.6   GLUCOSE 115* 127* 104*   CALCIUM 8.2* 8.0* 8.5*   BILITOT 0.6 0.4 0.4   ALKPHOS 83 96 115*   AST 18 18 18   ALT 13 13 12         Assessment/Plan:    Patient Active Problem List   Diagnosis    Alzheimer's dementia with behavioral disturbance (HCC)    Primary hypertension    Vitamin D deficiency    Unintentional weight loss    Edema of both lower 
JOSEW spoke with pts  to arrange meeting with icu team tomorrow at 10am.  will be here.  
LSW met with pts  to provide support following meeting with NP and ICU. Pts brother was in conference call during meeting. Pts  expressed he is ready to transition pt to comfort care only, however her brother from [place] now wants to see pt before this happens. Supportive listening provided as he discussed the difficulty of this decision.    
Neurology Progress Note      Jimena Rodriguez is a 75yo female with a phmhx of dementia who was admitted for respiratory arrest after a choking episode. There were concerns of seizure like activity with chocking. The choking was witnessed by  who denies any seizure like activity during or following event.    She was intubated by EMS. In the emergency department, endotracheal tube was exchanged which at that time food debris was suctioned from the airway. Patient became bradycardic without pulses, received 1 round of ACLS with 1 dose of epinephrine. ROSC was obtained and patient remained hypotensive and was initiated on Levophed IV drip The patient has remained intubated since admission. The patient continues to be treated for aspiration pneumonia.     On chart review, the patient is following commands without any further episodes concerning for seizure.    Neurology to sign off  Please re consult if any further concerns for seizure like activity or other neurologic concerns.       Rossana Coe PA-C  [phone] 01/17/25      
Notified that family would like to proceed with tracheostomy and PEG tube placement. Patient was seen off of sedation this AM and on pressure support. She awakes to voice and is following commands. No CXR was available for review from this morning. She is very awake and is able to lift her head up from the bed. Given clinical improvement would like to give her a chance at extubation. Case was discussed with the ICU attending and Dr. Abbott and the patient did require bronchoscopy for mucous plugs throughout hospital course with cultures growing Proteus. Will follow up on CXR for today given there was not one from this morning to review. If with improved RLL infiltrate/no infiltrate would recommend working towards extubation over tracheostomy. However if with persistent infiltrate and need for bronchoscopy and deep suctioning and inability to extubate will proceed with tracheostomy and PEG tube.     Discussed with Dr. Abbott and Dr. Alfonso.      at bedside and agreeable to the plan.    Silvana Mckeon MD   General surgery resident PGY-3     Electronically signed by Silvana Mckeon MD on 1/26/2025 at 12:23 PM      Addendum: patient remains too weak for extubation. CXR today with similar findings. Will tentatively plan for trach/peg 1/27     [facility]  SURGICAL INTENSIVE CARE UNIT (SICU)  ATTENDING PHYSICIAN CRITICAL CARE PROGRESS NOTE     I have examined the patient, reviewed the record,and discussed the case with the APN/  Resident. I have reviewed all relevant labs and imaging data. Please refer to the  APN/ resident's note. I agree with the  assessment and plan with the following corrections/ additions made above    Refer to Consult note by Dr. Cárdneas   Plan Trach and PEG tomorrow Still with RLL collapse and very weak cough discussed that the Trach is likely need more for aggressive resp hygiene than for the vent.  understands and wanting to proceed     Sherley Abbott MD    
PULMONARY  CRITICAL CARE   SERVICE DAILY PROGRESS  NOTE     1/14/2025   Hospital  LOS: 1 day      Admit date- 1/13/2025       Initially admitted for -   Choking (Per ems patient was eating and had a witnessed seizure. EMS was pacing patient and intubated prior to arrival. )       Subjective:      Intubated and sedated after a cardiac arrest       Review of Systems        Unable to obtain secondary to mental status. Pt is intubated and sedated                      Allergies:  Allergies   Allergen Reactions    Pcn [Penicillins] Itching and Rash     Home Meds:  Prior to Admission medications    Medication Sig Start Date End Date Taking? Authorizing Provider   triamterene-hydroCHLOROthiazide (DYAZIDE) 37.5-25 MG per capsule Take 1 capsule by mouth every morning    Gaudencio Betancourt MD   magnesium hydroxide (MILK OF MAGNESIA) 400 MG/5ML suspension Take 30 mLs by mouth daily as needed for Constipation    Gaudencio Betancourt MD   memantine (NAMENDA) 10 MG tablet Take 1 tablet by mouth 2 times daily    Gaudencio Betancourt MD   acetaminophen (TYLENOL) 325 MG tablet Take 2 tablets by mouth every 6 hours as needed for Pain    Gaudencio Betancourt MD   divalproex (DEPAKOTE SPRINKLES) 125 MG DR capsule Take 1 capsule by mouth 2 times daily 12/14/23   Kari Reyna DO   miconazole (MICOTIN) 2 % powder Apply topically 2 times daily. 12/14/23   Kari Reyna DO   donepezil (ARICEPT) 10 MG tablet Take 1 tablet by mouth nightly    Gaudencio Betancourt MD   metoprolol (LOPRESSOR) 100 MG tablet Take 1 tablet by mouth every morning    Gaudencio Betancourt MD   mirtazapine (REMERON) 7.5 MG tablet Take 1 tablet by mouth nightly    Gaudencio Betancourt MD   triamterene-hydroCHLOROthiazide (DYAZIDE) 37.5-25 MG per capsule Take 1 capsule by mouth three times a week *MON-WED-FRI*    Gaudencio Betancourt MD   potassium bicarbonate (EFFER-K/K-LYTE) 25 MEQ disintegrating tablet Take 1 tablet by mouth three times a week @ 
PULMONARY  CRITICAL CARE   SERVICE DAILY PROGRESS  NOTE     1/17/2025   Hospital  LOS: 4 days      Admit date- 1/13/2025       Initially admitted for -   Choking (Per ems patient was eating and had a witnessed seizure. EMS was pacing patient and intubated prior to arrival. )       Subjective:      Intubated and sedated after a cardiac arrest   Appears weak   Following commands   Had Low TVs on SBT , will continue to attempt     Review of Systems        Unable to obtain secondary to mental status. Pt is intubated and sedated                      Allergies:  Allergies   Allergen Reactions    Pcn [Penicillins] Itching and Rash     Home Meds:  Prior to Admission medications    Medication Sig Start Date End Date Taking? Authorizing Provider   triamterene-hydroCHLOROthiazide (DYAZIDE) 37.5-25 MG per capsule Take 1 capsule by mouth every morning    Gaudencio Betancourt MD   magnesium hydroxide (MILK OF MAGNESIA) 400 MG/5ML suspension Take 30 mLs by mouth daily as needed for Constipation    Gaudencio Betancourt MD   memantine (NAMENDA) 10 MG tablet Take 1 tablet by mouth 2 times daily    Gaudencio Betancourt MD   acetaminophen (TYLENOL) 325 MG tablet Take 2 tablets by mouth every 6 hours as needed for Pain    Gaudencio Betancourt MD   divalproex (DEPAKOTE SPRINKLES) 125 MG DR capsule Take 1 capsule by mouth 2 times daily 12/14/23   Kari Reyna DO   miconazole (MICOTIN) 2 % powder Apply topically 2 times daily. 12/14/23   Kari Reyna DO   donepezil (ARICEPT) 10 MG tablet Take 1 tablet by mouth nightly    Gaudencio Betancourt MD   metoprolol (LOPRESSOR) 100 MG tablet Take 1 tablet by mouth every morning    Gaudencio Betancourt MD   mirtazapine (REMERON) 7.5 MG tablet Take 1 tablet by mouth nightly    Gaudencio Betancourt MD   triamterene-hydroCHLOROthiazide (DYAZIDE) 37.5-25 MG per capsule Take 1 capsule by mouth three times a week *MON-WED-FRI*    Gaudencio Betancourt MD   potassium bicarbonate 
PULMONARY  CRITICAL CARE   SERVICE DAILY PROGRESS  NOTE     1/18/2025   Hospital  LOS: 5 days      Admit date- 1/13/2025       Initially admitted for -   Choking (Per ems patient was eating and had a witnessed seizure. EMS was pacing patient and intubated prior to arrival. )       Subjective:      Intubated and sedated after a cardiac arrest   Appears weak   Following simple commands   Had Low TVs on SBT , will continue to attempt with higher pressure support   Not apneic anymore on SBT , pulling lower but adequate TV on PS of 10     Review of Systems        Unable to obtain secondary to mental status. Pt is intubated and sedated                      Allergies:  Allergies   Allergen Reactions    Pcn [Penicillins] Itching and Rash     Home Meds:  Prior to Admission medications    Medication Sig Start Date End Date Taking? Authorizing Provider   triamterene-hydroCHLOROthiazide (DYAZIDE) 37.5-25 MG per capsule Take 1 capsule by mouth every morning    Gaudencio Betancourt MD   magnesium hydroxide (MILK OF MAGNESIA) 400 MG/5ML suspension Take 30 mLs by mouth daily as needed for Constipation    ProviderGaudencio MD   memantine (NAMENDA) 10 MG tablet Take 1 tablet by mouth 2 times daily    Gaudencio Betancourt MD   acetaminophen (TYLENOL) 325 MG tablet Take 2 tablets by mouth every 6 hours as needed for Pain    Gaudencio Betancourt MD   divalproex (DEPAKOTE SPRINKLES) 125 MG DR capsule Take 1 capsule by mouth 2 times daily 12/14/23   Kari Reyna DO   miconazole (MICOTIN) 2 % powder Apply topically 2 times daily. 12/14/23   Kari Reyna DO   donepezil (ARICEPT) 10 MG tablet Take 1 tablet by mouth nightly    Gaudencio Betancourt MD   metoprolol (LOPRESSOR) 100 MG tablet Take 1 tablet by mouth every morning    Gaudencio Betancourt MD   mirtazapine (REMERON) 7.5 MG tablet Take 1 tablet by mouth nightly    Gaudencio Betancourt MD   triamterene-hydroCHLOROthiazide (DYAZIDE) 37.5-25 MG per capsule Take 1 
PULMONARY  CRITICAL CARE   SERVICE DAILY PROGRESS  NOTE     1/19/2025   Hospital  LOS: 6 days      Admit date- 1/13/2025       Initially admitted for -   Choking (Per ems patient was eating and had a witnessed seizure. EMS was pacing patient and intubated prior to arrival. )       Subjective:      Intubated after a cardiac arrest , now off sedation for at least 24 hrs , does not follow commands   Did failry well on SBT on 01/18  Appears weak , goes into apnea   Following simple commands intermittently   Had Low TVs on SBT , will continue to attempt with higher pressure support     Review of Systems        Unable to obtain secondary to mental status. Pt is intubated and sedated                      Allergies:  Allergies   Allergen Reactions    Pcn [Penicillins] Itching and Rash     Home Meds:  Prior to Admission medications    Medication Sig Start Date End Date Taking? Authorizing Provider   triamterene-hydroCHLOROthiazide (DYAZIDE) 37.5-25 MG per capsule Take 1 capsule by mouth every morning    Gaudencio Betancourt MD   magnesium hydroxide (MILK OF MAGNESIA) 400 MG/5ML suspension Take 30 mLs by mouth daily as needed for Constipation    ProviderGaudencio MD   memantine (NAMENDA) 10 MG tablet Take 1 tablet by mouth 2 times daily    Gaudencio Betancourt MD   acetaminophen (TYLENOL) 325 MG tablet Take 2 tablets by mouth every 6 hours as needed for Pain    Gaudencio Betancourt MD   divalproex (DEPAKOTE SPRINKLES) 125 MG DR capsule Take 1 capsule by mouth 2 times daily 12/14/23   Kari Reyna DO   miconazole (MICOTIN) 2 % powder Apply topically 2 times daily. 12/14/23   Kari Reyna DO   donepezil (ARICEPT) 10 MG tablet Take 1 tablet by mouth nightly    Gaudencio Betancourt MD   metoprolol (LOPRESSOR) 100 MG tablet Take 1 tablet by mouth every morning    Gaudencio Betancourt MD   mirtazapine (REMERON) 7.5 MG tablet Take 1 tablet by mouth nightly    Gaudencio Betancourt MD 
Patient admitted to MICU with the following belongings:  Other lift from facility . The following belongings admitted with the patient, None, were sent home with the patient's family.   
Patient tried on PSV, patient is having low volume , and increased RR at this time. Patient placed back to AC mode at this time.   
Pharmacy Consultation Note  (Antibiotic Dosing and Monitoring)    Initial consult date: 1/14/25  Consulting physician/provider: CHAY Landaverde- CNP  Drug: Vancomycin  Indication: nosocomial pneumonia    Age/  Gender Height Weight IBW  Allergy Information   74 y.o./female  162.6 cm 75 kg (165 lb 5.5 oz)     Ideal body weight: 54.7 kg (120 lb 9.5 oz)  Adjusted ideal body weight: 62.8 kg (138 lb 7.9 oz)   Pcn [penicillins]      Renal Function:  Recent Labs     01/13/25  1745   BUN 26*   CREATININE 0.8     No intake or output data in the 24 hours ending 01/14/25 0229    Vancomycin Monitoring:  Trough:  No results for input(s): \"VANCOTROUGH\" in the last 72 hours.  Random:  No results for input(s): \"VANCORANDOM\" in the last 72 hours.    Vancomycin Administration Times:  Recent vancomycin administrations        No vancomycin IV orders with administrations found.                    Assessment:  Patient is a 74 y.o. female who has been initiated on vancomycin  Estimated Creatinine Clearance: 62 mL/min (based on SCr of 0.8 mg/dL).  To dose vancomycin, pharmacy will be utilizing  trough based dosing    Plan:  Will give vancomycin 1500 mg loading dose  Will start vancomycin 1250 mg IV every 24 hours  Will check vancomycin levels when appropriate  Will continue to monitor renal function   Pharmacy to follow    Thank you for your consult    Kymberly Gottlieb PharmD BCPS 1/14/2025 2:29 AM  [phone]    
Pharmacy Consultation Note  (Antibiotic Dosing and Monitoring)    Initial consult date: 1/14/25  Consulting physician/provider: MAREK Landaverde CNP  Drug: Vancomycin  Indication: nosocomial pneumonia    Age/  Gender Height Weight IBW  Allergy Information   74 y.o./female 162.6 cm (5' 4\")162.6 cm 75 kg (165 lb 5.5 oz)     Ideal body weight: 54.7 kg (120 lb 9.5 oz)  Adjusted ideal body weight: 67.3 kg (148 lb 4.5 oz)   Pcn [penicillins]      Renal Function:  Recent Labs     01/13/25  1745 01/14/25  0452 01/15/25  0356   BUN 26* 27* 23   CREATININE 0.8 0.7 0.9       Intake/Output Summary (Last 24 hours) at 1/15/2025 1501  Last data filed at 1/15/2025 1358  Gross per 24 hour   Intake 680.98 ml   Output 650 ml   Net 30.98 ml       Vancomycin Monitoring:  Trough:  No results for input(s): \"VANCOTROUGH\" in the last 72 hours.  Random:    Recent Labs     01/15/25  0356   VANCORANDOM 8.4       Vancomycin Administration Times:    Recent vancomycin administrations                     vancomycin (VANCOCIN) 1,250 mg in sodium chloride 0.9 % 250 mL IVPB ([id number]) (mg) 1,250 mg New Bag 01/15/25 0635    vancomycin (VANCOCIN) 1,500 mg in sodium chloride 0.9 % 250 mL IVPB ([id number]) (mg) 1,500 mg New Bag 01/14/25 0300                  Assessment:  Patient is a 74 y.o. female who has been initiated on vancomycin  Estimated Creatinine Clearance: 59 mL/min (based on SCr of 0.9 mg/dL).  To dose vancomycin, pharmacy will be utilizing  trough based dosing    Plan:  Will continue vancomycin 1250 mg IV every 24 hours  Will check vancomycin levels when appropriate  Will continue to monitor renal function   Pharmacy to follow    Thank you for your consult    Kymberly ChowD, BCPS, BCCCP 1/15/2025 3:02 PM  Phone: [phone]    
Pharmacy Consultation Note  (Antibiotic Dosing and Monitoring)    Initial consult date: 1/14/25  Consulting physician/provider: MAREK Landaverde CNP  Drug: Vancomycin  Indication: nosocomial pneumonia    Vancomycin has been discontinued   Clinical Pharmacy to sign-off  Physician to re-consult pharmacy if future dosing is needed    Thank you for the consult,    Alejo Abbas, PharmD, BCPS, BCCCP 1/16/2025 10:53 AM  Phone: [phone]    
Physical Therapy    PT consult to evaluate/treat received and appreciated.  Pt chart reviewed and evaluation attempted.  Pt is currently on hold for trach/PEG at time of attempt.  Will check back as able.  Thank you.        Ilir Gutierrez, PT, DPT   [id number]       
Physical Therapy    PT consult to evaluate/treat received and appreciated.  Pt chart reviewed and evaluation attempted.  Pt is currently on hold per nursing staff at AM rounds.  Will check back as able.  Thank you.        Ilir Gutierrez, PT, DPT   [id number]       
Physical Therapy    PT consult to evaluate/treat received and appreciated.  Pt chart reviewed and evaluation attempted.  Pt is currently on hold per nursing.  Will check back as able.  Thank you.        Ilir Gutierrez, PT, DPT   [id number]       
Physical Therapy    Physical Therapy Daily Treatment Note      Name: Jimena Rodriguez  : 1950  MRN: [id number]      Date of Service: 2025    Evaluating PT:  Ilir Gutierrez, PT, DPT  [id number]     Room #:  4406/4406-A  Diagnosis:  Cardiac arrest [I46.9]  Respiratory arrest (HCC) [R09.2]  Aspiration into airway, initial encounter [T17.908A]  PMHx/PSHx:   has a past medical history of Hypertension.   Procedure/Surgery:  Intubated ,  Bronch   Precautions:  Falls, Vent via ETT, OGT, Spastic paraparesis, 2 pt soft restraint, Alarms   Per neurology:  \"spastic paraparesis, likely from severe cervical spondylosis\" & \"recommend imaging studies of the cervical spine and neurosurgical evaluation, pending those results\"   -- Discussed with Dr. Duarte prior to session and states pt OK for light activity   Equipment Needs:  NA     SUBJECTIVE:    Pt admitted from nursing facility.  Pt's  reports that she is non-ambulatory and uses nakul lift bed<>manual wheelchair.      OBJECTIVE:   Initial Evaluation  Date: 25  Treatment  25 Short Term/ Long Term   Goals   AM-PAC 6 Clicks     Was pt agreeable to Eval/treatment? Yes  Yes     Does pt have pain? No c/o pain  No c/o pain     Bed Mobility  Rolling: Dep x2  Supine to sit: NT  Sit to supine: NT  Scooting: Dep x2 Rolling: Max A x2  Supine to sit: Max A x2  Sit to supine: Max A x2  Scooting: Dep x2 Rolling: Max A  Supine to sit: Max A  Sit to supine: Max A  Scooting: Max A   Transfers Sit to stand: NT  Stand to sit: NT  Stand pivot: NT NT Sit to stand: NA  Stand to sit: NA  Stand pivot: NA   Ambulation    NT NT NA   Stair negotiation: ascended and descended  NT NT NA   ROM BUE:  Per OT eval  BLE:  Limited in all planes d/t spastic paraparesis       Strength BUE:  Per OT eval   BLE:  Grossly 1/5      Balance Sitting EOB:  NT  Dynamic Standing:  nT Sitting EOB:  Max A Sitting EOB:  Max A  Dynamic Standing:  NA     Pt is A & O x 1  RASS:  -1 to 
Physical Therapy    Physical Therapy Daily Treatment Note      Name: Jimena Rodriguez  : 1950  MRN: [id number]      Date of Service: 2025    Evaluating PT:  Ilir Gutierrez, PT, DPT  [id number]     Room #:  4406/4406-A  Diagnosis:  Cardiac arrest [I46.9]  Respiratory arrest (HCC) [R09.2]  Aspiration into airway, initial encounter [T17.908A]  PMHx/PSHx:   has a past medical history of Hypertension.   Procedure/Surgery:  Intubated ,  Bronch ;  Trach and PEG    Precautions:  Falls, Trach to Vent, PEG, Spastic paraparesis, 2 pt soft restraint, Alarms   Per neurology:  \"spastic paraparesis, likely from severe cervical spondylosis\" & \"recommend imaging studies of the cervical spine and neurosurgical evaluation, pending those results\"   -- Discussed with Dr. Duarte prior to session and states pt OK for light activity   Equipment Needs:  NA     SUBJECTIVE:    Pt admitted from nursing facility.  Pt's  reports that she is non-ambulatory and uses nakul lift bed<>manual wheelchair.      OBJECTIVE:   Initial Evaluation  Date: 25  Treatment  25 Short Term/ Long Term   Goals   AM-PAC 6 Clicks     Was pt agreeable to Eval/treatment? Yes  Yes     Does pt have pain? No c/o pain  No c/o pain     Bed Mobility  Rolling: Dep x2  Supine to sit: NT  Sit to supine: NT  Scooting: Dep x2 Rolling: Max A x2  Supine to sit: Max A x2  Sit to supine: Max A x2  Scooting: Dep x2 Rolling: Max A  Supine to sit: Max A  Sit to supine: Max A  Scooting: Max A   Transfers Sit to stand: NT  Stand to sit: NT  Stand pivot: NT NT Sit to stand: NA  Stand to sit: NA  Stand pivot: NA   Ambulation    NT NT NA   Stair negotiation: ascended and descended  NT NT NA   ROM BUE:  Per OT eval  BLE:  Limited in all planes d/t spastic paraparesis       Strength BUE:  Per OT eval   BLE:  Grossly 1/5      Balance Sitting EOB:  NT  Dynamic Standing:  NT Sitting EOB:  Max A Sitting EOB:  Max A  Dynamic Standing:  NA     Pt is A & 
Physical Therapy  PT consult to evaluate/treat received and appreciated.  Pt chart reviewed and dicussed at quality flow rounds.  Pt is currently not following commands.  Will check back as able.  Thank you.        Ilir Gutierrez, PT, DPT   [id number]        
Placed back on AC due to low tidal volume and minute volume   
Placed back to AC/VC due to increased WOB (increased RR and RSBI over 115). Patient more comfortable on AC/VC settings.   
Pt continues to reach for lines and tubes despite attempts to deter.  Restraints continued for pt safety. GENA JEAN RN  
Pt continues to reach for lines and tubes despite attempts to deter.  Restraints continued for pt safety. GENA JEAN RN  
Pt continues to reach for lines and tubes despite attempts to deter. Bilateral soft wrist restraints continued for pt safety.   
Restraint type: Soft restraint:  right wrist and left wrist  Reason for restraints: Pulling out devices:  Unable to follow directions/instructions  Duration: 24 hours    Restraints must be removed when an alternative is available and effective and/or patient no longer meets criteria.    Orders must be renewed every calendar day or when discontinued.    The MD must conduct a face to face assessment within 1 calendar day of initiation when initial restraint order is verbal.   
Show was reviewed.  Case discussed with ICU team.  At 10 multiple times to contact patient  over the phone, with not success, he has not been visiting her the patient today.  Palliative medicine is planning to set up a meeting with ICU team, patient's spouse, and her brother who is able to participate over the speaker phone, potentially tomorrow January 23, 2025 at 10 AM, however, we need to confirm this with spouse.  Bedside nurse notified, palliative medicine still is trying to reach out to spouse and see if 10 AM tomorrow works for him.  
Spiritual Health History and Assessment/Progress Note  [facility]    Follow-up,  ,  ,      Name: Jimena Rodriguez MRN: [id number]    Age: 74 y.o.     Sex: female   Language: English   Restorationism: Unknown   Respiratory arrest (HCC)     Date: 1/21/2025                           Spiritual Assessment began in [facility]        Referral/Consult From: (P) Rounding   Encounter Overview/Reason: Follow-up  Service Provided For: Patient    Anisa, Belief, Meaning:   Patient unable to assess at this time  Family/Friends identify as spiritual and have beliefs or practices that help with coping during difficult times      Importance and Influence:  Patient unable to assess at this time  Family/Friends have spiritual/personal beliefs that influence decisions regarding the patient's health    Community:  Patient Other: unable to assess at this time.  Family/Friends are connected with a spiritual community: and feel well-supported. Support system includes: Spouse/Partner, Extended family, and Other: The family does not currently attend Evangelical but they are strong in prayer.    Assessment and Plan of Care:     Patient Interventions include: Other: The patient unable to be assessed at this time.  Family/Friends Interventions include: Facilitated expression of thoughts and feelings, Explored spiritual coping/struggle/distress, Engaged in theological reflection, Affirmed coping skills/support systems, and Facilitated life review and/or legacy    Patient Plan of Care: Other: The  will follow as needed.  Family/Friends Plan of Care: Other: The  will follow as needed.    Electronically signed by Chaplain Angela on 1/21/2025 at 2:21 PM    
Spontaneous Parameters performed on PSV of 5     VT = 396 ml  f = 24  B/M  Ve = 9.5 L/M  NIF = -7.1  cmH2O  VC = 194 L  RSBI = 60  Cuff leak test performed, audible leak heard , cuff re inflated       Performed by Kristi Fisher RCP  
The  visited the patient who was unable to be assessed at the time of visit. The  provided sustaining ministry presence and prayer. The  will follow as needed.  If additional support is needed please reach out to Spiritual Health (ext [phone]).    Rev NEYMAR Fang MDIV,      
The  visited with family as they were making decisions regarding the next steps in the patient's care. The patient's  informed me that the decision had been made for the patient to be transferred. The  provided [facility] ministry presence and prayer support for the family. Present during this visit was the patients , her brother and his girlfriend. The family was very tearful during the visit. Transport game right after this special time with the family. dermatitis  If additional support is needed please reach out to Spiritual Health (ext [phone]).    Rev NEYMAR Fang MDIV,      
While bathing patient, this nurse attempted to communicate with patient. Patient would nod head appropriately to simple questions. She held onto the bed rail when turned and was redirectable when trying to pull at   ETT.   
activity:  SpO2 98%, HR 62 bpm, 106/49    Therapeutic Exercises:    BLE AAROM in supine:  ankle pumps x10, hip abduction/adduction x10 reps, SAQ x10     Patient education  Pt educated on PT role, safety during functional mobility    Patient response to education:   Pt verbalized understanding Pt demonstrated skill Pt requires further education in this area   Somewhat  Somewhat  Yes      ASSESSMENT:    Conditions Requiring Skilled Therapeutic Intervention:    [x]Decreased strength     [x]Decreased ROM  [x]Decreased functional mobility  [x]Decreased balance   [x]Decreased endurance   [x]Decreased posture  []Decreased sensation  []Decreased coordination   []Decreased vision  [x]Decreased safety awareness   []Increased pain       Comments:  Pt received supine and agreeable to PT evaluation with OT collab.  Vitals monitored during session.  Pt required assistance for BUE and BLE AAROM in supine to tolerance.  Pt noted to be soiled of bowel.  Pt assisted with log rolling L<>R for pads/positioning, and hygiene.  Pt positioned in semi-chair position after bed mobility and AAROM with pillows, TAPS, and prevalon boots.  Pt left with call button in reach, lines attached, and needs met.      Treatment:  Patient practiced and was instructed in the following treatment:    Bed mobility training - pt given verbal and tactile cues to facilitate proper sequencing and safety during rolling and scooting as well as provided with physical assistance to complete task    Lower extremity therapeutic exercises  -- see above  Skilled positioning - Pt placed in the semi-chair position with pillows utilized to facilitate upright posture, joint and skin integrity, and interaction with environment.      Non-pharmacological treatment and prevention of ICU delirium - Pt oriented to date, time, time of day, place, and situation as well as provided with visual and auditory stimuli in order to improve cognition and combat effects of ICU delirium.   
daily)  Water Flushes: 180 ml q 4 hrs = 1080 ml water  Current TF Provides: 1200 ml tv, 1440 kcals, 67 gm pro (1540 kcals, 93 gm pro w/ mod), 984 ml free water, 2064 ml total fluids  Additional Calorie Sources:  none    Anthropometric Measures:  Height: 162.6 cm (5' 4\")  Ideal Body Weight (IBW): 120 lbs (55 kg)    Admission Body Weight: 74.8 kg (165 lb) (1/13 actual)  Current Body Weight: 76.7 kg (169 lb) (dry bed scale wt as CBW appears elevated), 140.8 % IBW.    Current BMI (kg/m2): 29  Usual Body Weight: 74.8 kg (165 lb) (12/2023 actual per EMR)     % Weight Change (Calculated): 0                    BMI Categories: Overweight (BMI 25.0-29.9)    Estimated Daily Nutrient Needs:  Energy Requirements Based On: Formula  Weight Used for Energy Requirements: Current (dry wt 1/24)  Energy (kcal/day): PS3B 1397; 3657-5128  Weight Used for Protein Requirements: Ideal  Protein (g/day): 80-90 (1.4-1.6 gm/kg IBW)  Method Used for Fluid Requirements: Defer to provider  Fluid (ml/day): per critical care    Nutrition Diagnosis:   Inadequate oral intake related to impaired respiratory function as evidenced by NPO or clear liquid status due to medical condition, intubation, nutrition support - enteral nutrition    Nutrition Interventions:     Nutrition Education/Counseling: Education/Counseling not appropriate  Coordination of Nutrition Care: Continue to monitor while inpatient       Goals:  Goals: Tolerate nutrition support at goal rate  Type of Goal: Continue current goal  Previous Goal Met: Goal(s) Achieved    Nutrition Monitoring and Evaluation:      Food/Nutrient Intake Outcomes: Enteral Nutrition Intake/Tolerance  Physical Signs/Symptoms Outcomes: Biochemical Data, Diarrhea, GI Status, Fluid Status or Edema, Hemodynamic Status, Nutrition Focused Physical Findings, Skin, Weight    Discharge Planning:    Enteral Nutrition     Cira Boyer, MS, RD, LD  Contact: [phone]    
intact, conjunctivae normal  Neck: neck supple and non tender without mass , on tracheostomy  Pulmonary/Chest: clear to auscultation bilaterally- no wheezes, rales or rhonchi, normal air movement, no respiratory distress  Cardiovascular: normal rate, normal S1 and S2 and no carotid bruits  Abdomen: soft, non-tender, non-distended, normal bowel sounds, no masses or organomegaly, PEG tube in place  Extremities: no cyanosis, no clubbing and +2 pitting edema      Recent Labs     01/25/25  0406 01/26/25  0416 01/27/25  0408    141 142   K 3.4* 3.6 3.5   * 108* 107   CO2 26 25 24   BUN 11 13 13   CREATININE 0.7 0.6 0.6   GLUCOSE 115* 127* 104*   CALCIUM 8.2* 8.0* 8.5*       Recent Labs     01/25/25  0406 01/26/25 0416 01/27/25  0408   WBC 7.7 7.3 7.1   RBC 2.46* 2.46* 2.49*   HGB 7.6* 7.7* 7.8*   HCT 23.6* 23.6* 24.3*   MCV 95.9 95.9 97.6   MCH 30.9 31.3 31.3   MCHC 32.2 32.6 32.1   RDW 15.9* 15.6* 15.9*    164 168   MPV 11.9 11.7 11.5       Radiology: Reviewed    Assessment and plan:    Septic shock-resolved.  Status post bronchoscopy on January 14.  Monitor GPC.  Coronavirus NL 63+ , currently out of isolation.  Status post vancomycin.  Continue IV antibiotics per critical care. Change Zosyn to meropenam.  Off pressor support.  Managed as per medical ICU  Status-post cardiopulmonary arrest.  Off pressor support.  EEG nonspecific.  ECHO 60-65% EF.   Ventilator dependent respiratory failure-likely related to aspiration pneumonia.  Respiratory culture showing Proteus Mirabella's.  Continue antibiotics.  Critical care management.  Status post bronchoscopy on January 14.  Seizure-like activity-EEG nonspecific.  Status post Keppra.    Tracheostomy and PEG tube was placed on 1/27/2025    NOTE: This report was transcribed using voice recognition software. Every effort was made to ensure accuracy; however, inadvertent computerized transcription errors may be present.  Electronically signed by PERRY HENDRIX MD 
11.7 11.9 12.1*   INR 1.2 1.2 1.3     Chemistry:  Recent Labs     01/22/25  0433 01/23/25  0404 01/24/25  0411    144 143   K 3.7 3.5 3.1*   * 107 107   CO2 24 27 25   GLUCOSE 99 119* 132*   BUN 15 11 9   CREATININE 0.7 0.7 0.7   MG 2.1 2.3 1.9   ANIONGAP 10 10 11   LABGLOM >90 >90 >90   CALCIUM 7.8* 8.1* 8.4*   PHOS 2.9 2.4* 2.7     Recent Labs     01/22/25  0433 01/23/25  0404 01/24/25  0411   AST 28 18 15   ALT 30 19 14   ALKPHOS 96 82 73   BILITOT 0.5 0.7 0.8     ABG:  Lab Results   Component Value Date/Time    PH 7.525 01/24/2025 04:10 AM    PCO2 32.0 01/24/2025 04:10 AM    PO2 119.7 01/24/2025 04:10 AM    HCO3 25.8 01/24/2025 04:10 AM    BE 3.0 01/24/2025 04:10 AM    THB 7.9 01/24/2025 04:10 AM    O2SAT 98.4 01/24/2025 04:10 AM    FIO2 40.0 01/24/2025 04:10 AM     No results found for: \"SPECIAL\"  Lab Results   Component Value Date/Time    CULTURE (A) 01/22/2025 12:56 PM     PROTEUS MIRABILIS Identification by MALDI-TOF MODERATE GROWTH    CULTURE NO NORMAL ALEXA (A) 01/22/2025 12:56 PM       Radiology:  XR CHEST PORTABLE    Result Date: 1/23/2025  1. Support hardware in similar appearance to prior with low position of the endotracheal tube terminating around 1.3 cm above the level the musa. 2. Cardiac size enlarged with bilateral interstitial lung markings and edema appearance with small to moderate volume right pleural effusion similar to prior.     XR CHEST PORTABLE    Result Date: 1/22/2025  1. Low positioning of endotracheal tube, which may be secondary to patient neck position.  Consider retraction for more optimal placement. 2. Stable appearance of the chest. The findings were sent to the Radiology Results Communication Center at 9:56 am on 1/22/2025 to be communicated to a licensed caregiver.     XR CHEST PORTABLE    Result Date: 1/21/2025  Right lung and left lower lung atelectasis/pneumonia.  Small right pleural effusion.     XR CHEST PORTABLE    Result Date: 1/18/2025  Slight worsening 
IVPB (PERIPHERAL LINE)  30 mmol IntraVENous Once    guaiFENesin  200 mg Per NG tube Q6H    sodium chloride flush  5-40 mL IntraVENous 2 times per day    enoxaparin  40 mg SubCUTAneous Daily    chlorhexidine  15 mL Mouth/Throat BID    famotidine (PEPCID) injection  20 mg IntraVENous BID    piperacillin-tazobactam  4,500 mg IntraVENous Q8H    ipratropium 0.5 mg-albuterol 2.5 mg  1 Dose Inhalation Q4H WA RT    levothyroxine  25 mcg Oral Daily     PRN Meds: sulfur hexafluoride microspheres, sodium chloride flush, sodium chloride, polyethylene glycol, acetaminophen **OR** acetaminophen, magnesium sulfate, potassium chloride **OR** potassium chloride, prochlorperazine **OR** prochlorperazine, atropine, sodium chloride flush    Labs:     Recent Labs     01/20/25  0450 01/20/25  0616 01/21/25  0409   WBC 8.0 12.4* 10.6   HGB 7.4* 8.6* 8.2*   HCT 22.3* 26.7* 25.4*    92* 104*       Recent Labs     01/20/25  0450 01/20/25  0616 01/21/25  0409    142 141   K 4.0 3.2* 3.5    106 107   CO2 27 26 24   BUN 25* 22 19   CREATININE 0.7 0.7 0.7   CALCIUM 9.2 7.7* 7.7*   PHOS 3.5 2.0* 2.3*       Recent Labs     01/19/25  0417 01/20/25  0356 01/21/25  0409   ALKPHOS 107* 104 99   ALT 34* 37* 36*   AST 45* 47* 38*   BILITOT 0.4 0.5 0.4       Recent Labs     01/21/25  0409   INR 1.1         No results for input(s): \"CKTOTAL\", \"TROPONINI\" in the last 72 hours.    Chronic labs:    Lab Results   Component Value Date    CHOL 131 01/14/2025    TRIG 61 01/14/2025    HDL 54 01/14/2025    TSH 22.74 (H) 01/14/2025    INR 1.1 01/21/2025       Radiology: REVIEWED DAILY    +++++++++++++++++++++++++++++++++++++++++++++++++  Liliane Jimenez MD  [facility] [place], [place]  +++++++++++++++++++++++++++++++++++++++++++++++++  NOTE: This report was transcribed using voice recognition software. Every effort was made to ensure accuracy; however, inadvertent computerized 
PCR Not Detected     Influenza B by PCR Not Detected     Parainfluenza 1 PCR Not Detected     Parainfluenza 2 PCR Not Detected     Parainfluenza 3 PCR Not Detected     Parainfluenza 4 PCR Not Detected     Resp Syncytial Virus PCR Not Detected     Bordetella parapertussis by PCR Not Detected     B Pertussis by PCR Not Detected     Chlamydia pneumoniae By PCR Not Detected     Mycoplasma pneumo by PCR Not Detected     Comment: Performed by multiplexed nucleic acid assay.       Culture, MRSA, Screening [[id number]] Collected: 01/14/25 0228    Order Status: Completed Specimen: Nares Updated: 01/15/25 1204     Specimen Description .NARES     Culture NEGATIVE FOR: METHICILLIN RESISTANT STAPHYLOCOCCUS AUREUS    Culture, Respiratory [[id number]] Collected: 01/14/25 0228    Order Status: Completed Specimen: Endotracheal Updated: 01/16/25 1253     Specimen Description .ENDOTRACHEAL     Direct Exam MODERATE Polymorphonuclear leukocytes      NO EPITHELIAL CELLS      RARE MIXED BACTERIAL MORPHOTYPES SEEN ON GRAM STAIN.     Culture NORMAL RESPIRATORY ALEXA    Culture, Blood 2 [[id number]] Collected: 01/13/25 1903    Order Status: Completed Specimen: Blood Updated: 01/18/25 1945     Specimen Description .BLOOD     Special Requests          Culture NO GROWTH 5 DAYS    Blood Culture 1 [[id number]] Collected: 01/13/25 1745    Order Status: Completed Specimen: Blood Updated: 01/18/25 1846     Specimen Description .BLOOD     Special Requests          Culture NO GROWTH 5 DAYS             Radiology and available imaging -   Reviewed      ASSESSMENT AND PLAN:    Acute cardiopulmonary arrest ( in the ED )   Downtime - less than a minute   Initial rhythm-  Bradycardia followed by asystole   ROSC - 2  minutes   ROSC rhythm-  Sinus tachy with PACs   Cause of arrest - Aspiration event - chocking , food particles suctioned out of ETT in ED   Underlying co-morbidities    Dementia    HTN   Deconditioning and debility - bed /wheelchair bound at 
Small right pleural effusion.     XR CHEST PORTABLE    Result Date: 1/18/2025  Slight worsening of the markings in the mid and lower lung fields with lower lung volumes on today's examination.  Lines and tubes are stable.  Small bilateral pleural effusions cannot be excluded.       Physical Examination:        General appearance:  intubated and sedated  HEENT:  Conjunctivae/corneas clear.   Neck: Supple. No jugular venous distention.   Respiratory: symmetrical; clear to auscultation bilaterally; no wheezes; no rhonchi; no rales  Cardiovascular: rhythm regular; rate controlled; no murmurs  Abdomen: Soft, nontender, nondistended  Extremities:  peripheral pulses present; no peripheral edema; no ulcers  Musculoskeletal: No clubbing, cyanosis, no bilateral lower extremity edema. Brisk capillary refill.   Skin:  No rashes  on visible skin  Neurologic: intubated and sedated    Assessment:        Hospital Problems             Last Modified POA    * (Principal) Respiratory arrest (HCC) 1/13/2025 Yes    Shock 1/14/2025 Yes    Cardiac arrest 1/14/2025 Yes    Palliative care encounter 1/20/2025 Yes    Goals of care, counseling/discussion 1/20/2025 Yes       Plan:        Septic shock-resolved.  Status post bronchoscopy on January 14.  Monitor GPC.  COVID-positive, currently out of isolation.  Status post vancomycin.  Continue IV antibiotics per critical care.  Off pressor support.  Status postcardiac arrest.  Off pressor support.  Ventilator dependent respiratory failure-likely related to aspiration pneumonia.  Continue antibiotics.  Critical care management.  Continue SBT as tolerated.  Status post bronchoscopy on January 14.  Seizure-like activity-EEG nonspecific.  Status post Keppra.    Greater than 35 minutes spent in physical exam, chart review and assessment plan.    Los Pandey MD  1/23/2025  2:21 PM    
Syncytial Virus PCR Not Detected     Bordetella parapertussis by PCR Not Detected     B Pertussis by PCR Not Detected     Chlamydia pneumoniae By PCR Not Detected     Mycoplasma pneumo by PCR Not Detected     Comment: Performed by multiplexed nucleic acid assay.       Culture, MRSA, Screening [[id number]] Collected: 01/14/25 0228    Order Status: Sent Specimen: Nares Updated: 01/14/25 0245    Culture, Respiratory [[id number]] Collected: 01/14/25 0228    Order Status: Completed Specimen: Endotracheal Updated: 01/14/25 1612     Specimen Description .ENDOTRACHEAL     Direct Exam MODERATE Polymorphonuclear leukocytes      NO EPITHELIAL CELLS      RARE MIXED BACTERIAL MORPHOTYPES SEEN ON GRAM STAIN.     Culture PENDING    Culture, Blood 2 [[id number]] Collected: 01/13/25 1903    Order Status: Completed Specimen: Blood Updated: 01/14/25 1945     Specimen Description .BLOOD     Special Requests          Culture NO GROWTH 1 DAY    Blood Culture 1 [[id number]] Collected: 01/13/25 1745    Order Status: Completed Specimen: Blood Updated: 01/14/25 1846     Specimen Description .BLOOD     Special Requests          Culture NO GROWTH 1 DAY             Radiology and available imaging -   Reviewed      ASSESSMENT AND PLAN:    Acute cardiopulmonary arrest ( in the ED )     Downtime - less than a minute   Initial rhythm-  Bradycardia followed by asystole   ROSC - 2  minutes   ROSC rhythm-  Sinus tachy with PACs     Cause of arrest - Aspiration event - chocking , food particles suctioned out of ETT in ED     Underlying co-morbidities    Dementia    HTN   Deconditioning and debility - bed /wheelchair bound at baseline        - Continue the mechanical ventilation , Will modify the vent settings with the following goals-   Ph- 7.30-7.45    Sao2- >94%    Pao2- 65-80    Paco2- 40-45    ETco2- 35-40.     - Respiratory viral panel - coronavirus + , not COVID   - S/p Bronchoscopy - therapeutic and diagnostic, cultures NGTD   - Continue 
atraumatic.  Mouth -orally intubated.  + OG.  Eyes- Conjunctivae pink, anicteric.  Neck-right IJ.  Unable to assess for JVD.  CHEST: Chest symmetrical and non-tender to palpation. No accessory muscle use or intercostal retractions.  RESPIRATORY: Lung sounds -diminished breath sounds.  CARDIOVASCULAR:     No noted carotid bruit.  Heart Ausculation- Regular rate and rhythm, no apparent murmur.   PV: Trace-1+ bilateral lower extremity edema. No varicosities. Pedal pulses palpable, no clubbing or cyanosis.   ABDOMEN: Soft, obese, non-tender to light palpation. Bowel sounds present.   MS: Good muscle tone.  Unable to assess for strength no atrophy or abnormal movements.   SKIN: Warm and dry. No statis dermatitis or ulcers.  NEURO / PSYCH: Unable to fully assess due to the patient is intubated on mechanical ventilation.  She does open eyes on commands.      DATA:    Telemetry: Sinus rhythm, heart rate in the 70s.    EK2025 0516        EK2025 1758      Diagnostic:  All diagnostic testing and lab work thus far this admission reviewed in detail.    Chest x-ray: 2025  1. Lines and tubes in satisfactory position.  2. Improving interstitial markings within the lung fields.  No new focal  parenchymal opacification present.    X-ray abdomen for NG tube placement: 2025:  Gastric tube with good positioning.       CT abdomen/pelvis: 2025  1. Chest: Pulmonary opacification suggestive of aspiration in all lobes,  worst in the right base.  No acute pulmonary embolus identified.  2. Abdomen/pelvis: Mild right upper quadrant nonspecific free fluid; possible  hepatic cirrhosis.  Rectal distension with stool.      CTA pulmonary with contrast: 2025:  1. Chest: Pulmonary opacification suggestive of aspiration in all lobes,  worst in the right base.  No acute pulmonary embolus identified.  2. Abdomen/pelvis: Mild right upper quadrant nonspecific free fluid; possible  hepatic cirrhosis.  Rectal distension 
present.      
, Will modify the vent settings with the following goals-   Ph- 7.30-7.45    Sao2- >94%    Pao2- 65-80    Paco2- 40-45    ETco2- 35-40.     - Respiratory viral panel - coronavirus + , not COVID   - S/p Bronchoscopy - therapeutic and diagnostic, cultures NGTD   - Continue vasopressor support and wean as tolerated , goal MAP > 60   - Continue bronchodilators.   - Discontinue vanc and  Continued zosyn for possible aspiration PNA   - I don't believe therapeutic hypothermia will be beneficial .   - Prevent fevers with  tylenol if need be.    - EEG pending , questionable seizures prior to presentation   - Continue Zosyn and vanc for now , follow up cultures   -  ECHO - reviewed - largely WNL       Prophylaxis:  Stress ulcer: [] PPI Agent [x] H2RA [] Sucralfate [] Other:   VTE: [x] Enoxaparin  [] SC Heparin  [] SCD      I spent 34 minutes of Critical care time completing this encounter. Total critical care  time included the following:    Independently interviewing the patient (HPI, ROS, PMH, PSH, FMH, SH, allergies and medications).  Direct Bed side Patient care   Independently performing a medical appropriate examination  Ordering medications, tests and/or procedures  Formulating the assessment/plan and reviewing the rationale for the above recommendations  Reviewing available records, results of all previously ordered testing/procedures and current problem list  Counseling/educating the patient  Coordinating care with other healthcare professionals  Documenting clinical information in the patient's electronic health records     Electronically signed by Sami Subramanian MD on 1/16/2025 at 10:49 AM       
Polymorphonuclear leukocytes      NO EPITHELIAL CELLS      RARE MIXED BACTERIAL MORPHOTYPES SEEN ON GRAM STAIN.     Culture NORMAL RESPIRATORY ALEXA    Culture, Blood 2 [[id number]] Collected: 01/13/25 1903    Order Status: Completed Specimen: Blood Updated: 01/18/25 1945     Specimen Description .BLOOD     Special Requests          Culture NO GROWTH 5 DAYS    Blood Culture 1 [[id number]] Collected: 01/13/25 1745    Order Status: Completed Specimen: Blood Updated: 01/18/25 1846     Specimen Description .BLOOD     Special Requests          Culture NO GROWTH 5 DAYS             Radiology and available imaging -   Reviewed      ASSESSMENT AND PLAN:    Acute cardiopulmonary arrest ( in the ED )   Downtime - less than a minute   Initial rhythm-  Bradycardia followed by asystole   ROSC - 2  minutes   ROSC rhythm-  Sinus tachy with PACs   Cause of arrest - Aspiration event - chocking , food particles suctioned out of ETT in ED   Underlying co-morbidities    Dementia    HTN   Deconditioning and debility - bed /wheelchair bound at baseline   Proteus pneumonia    Plan:  -Continue SBT trial, opens eyes but does not follow commands.  SBT Trial ongoing, plan to extubate if patient passes weaning parameters.  Increased amount of ET tube secretion noted.  Chest x-ray is consistent with bilateral aspiration with effusion.  -MetaNeb, aggressive pulmonary suction and Mucomyst to continue.  - Respiratory viral panel - coronavirus NL63+ , not COVID   - S/p Bronchoscopy - therapeutic and diagnostic, cultures NGTD   - Off pressors , tolerating TF   - Continue bronchodilators.   -Start on continue meropenem  - Prevent fevers with  tylenol if need be.    - EEG - non specific   - Continue Zosyn and  follow up cultures   -  ECHO - reviewed - largely WNL   - Continue SAT/SBT and liberation attempts today , discussed with respiratory , continue SBT for as long as tolerated   - Get CXR q 72 hrs or with major clinical change   -Discussion with 
cerebellum.  No gross pathology is noted  3. Atrophy and periventricular leukomalacia.  .    Chest x-ray: 1/13/2025:  1. Appropriate positioning of endotracheal tube.  2. Nonspecific perihilar airspace and interstitial opacities.             Labs:   CBC:   Recent Labs     01/15/25  0356 01/16/25  0412   WBC 15.9* 15.5*   HGB 11.5 10.4*   HCT 33.5* 31.4*    139     BMP:   Recent Labs     01/15/25  0356 01/16/25  0412    144   K 3.4* 2.9*   CO2 25 28   BUN 23 23   CREATININE 0.9 0.8   LABGLOM 71 74   CALCIUM 8.4* 8.4*     Mag:   Recent Labs     01/15/25  0356 01/16/25  0412   MG 2.3 2.4     proBNP:   Lab Results   Component Value Date/Time    PROBNP 125 01/13/2025 05:45 PM    PROBNP 124 12/11/2023 10:38 AM     PT/INR:   Recent Labs     01/13/25  1903 01/14/25  0452   PROTIME 13.3* 13.8*   INR 1.2 1.3     APTT:  Recent Labs     01/13/25 1903   APTT 37.2*     TROPONIN:  Lab Results   Component Value Date/Time    TROPHS 257 01/14/2025 11:53 AM    TROPHS 270 01/14/2025 10:13 AM    TROPHS 278 01/14/2025 04:52 AM    TROPHS 21 01/13/2025 05:45 PM    TROPHS 26 05/11/2024 06:16 AM       LIVER PROFILE:  Recent Labs     01/15/25  0356 01/16/25  0412   AST 42* 36*   ALT 20 19         Transthoracic echocardiogram January 15, 2025    Left Ventricle: The EF by visual approximation is 60 - 65%. Findings consistent with mild concentric hypertrophy. LVOT Peak Gradient is 25 mmHg.    Right Ventricle: Right ventricle size is normal. Normal systolic function.    Aortic Valve: Not well visualized. No stenosis. AV area by continuity VTI is 2.4 cm2. AV area by peak velocity is 2.4 cm2.    Mitral Valve: Mild regurgitation. No stenosis noted. MV area by PHT is 7.3 cm2. MV area by continuity equation is 9.3 cm2.    Tricuspid Valve: Mild regurgitation.    Image quality is technically difficult. Technically difficult study due to patient's body habitus.         EKG January 13, 2025 at 5:58 PM revealed sinus rhythm 69 bpm and 
  MCV  --   --  91.0  --    MCH  --   --  31.3  --    MCHC  --   --  34.3  --    RDW  --   --  15.3*  --    PLT  --   --  151  --    MPV  --   --  10.9  --    PH  --   --   --  7.572*   PO2  --   --   --  205.1*   PCO2  --   --   --  29.3*   HCO3  --   --   --  26.4*   BE  --   --   --  4.8*   O2SAT  --   --   --  99.1*   LACTA  --    < > 1.5  --    HDL 54  --   --   --     < > = values in this interval not displayed.       Imaging  XR CHEST PORTABLE   Final Result   1. Lines and tubes in satisfactory position.   2. Improving interstitial markings within the lung fields.  No new focal   parenchymal opacification present.         XR ABDOMEN FOR NG/OG/NE TUBE PLACEMENT   Final Result   Gastric tube with good positioning.         CT HEAD WO CONTRAST   Final Result   1. There is no acute intracranial hemorrhage or acute intracranial abnormality   2. Please note motion artifact distorts the images through the cerebellum.   No gross pathology is noted   3. Atrophy and periventricular leukomalacia.   .         CTA PULMONARY W CONTRAST   Final Result   1. Chest: Pulmonary opacification suggestive of aspiration in all lobes,   worst in the right base.  No acute pulmonary embolus identified.   2. Abdomen/pelvis: Mild right upper quadrant nonspecific free fluid; possible   hepatic cirrhosis.  Rectal distension with stool.         CT ABDOMEN PELVIS W IV CONTRAST Additional Contrast? None   Final Result   1. Chest: Pulmonary opacification suggestive of aspiration in all lobes,   worst in the right base.  No acute pulmonary embolus identified.   2. Abdomen/pelvis: Mild right upper quadrant nonspecific free fluid; possible   hepatic cirrhosis.  Rectal distension with stool.         XR CHEST PORTABLE   Final Result   1. Appropriate positioning of endotracheal tube.   2. Nonspecific perihilar airspace and interstitial opacities.             I have personally reviewed the following images:       CT head      Other Testing 
constipation and possible developing ileus, with note of rectal distension that may represent fecal impaction, having axial dimension approaching 9 cm.  Right femoral catheter noted into mid pelvis external iliac vein region.  There is no specific acute solid organ abnormality appreciated within exam limitations.  Possible hepatic cirrhosis.  There is indistinct apparent fluid attenuation in the right upper quadrant of uncertain etiology, periportal region for example     1. Chest: Pulmonary opacification suggestive of aspiration in all lobes, worst in the right base.  No acute pulmonary embolus identified. 2. Abdomen/pelvis: Mild right upper quadrant nonspecific free fluid; possible hepatic cirrhosis.  Rectal distension with stool.     CT ABDOMEN PELVIS W IV CONTRAST Additional Contrast? None    Result Date: 1/13/2025  EXAMINATION: CT OF THE ABDOMEN AND PELVIS WITH CONTRAST; CTA OF THE CHEST 1/13/2025 7:37 pm TECHNIQUE: CT of the abdomen and pelvis was performed with the administration of intravenous contrast. Multiplanar reformatted images are provided for review. Automated exposure control, iterative reconstruction, and/or weight based adjustment of the mA/kV was utilized to reduce the radiation dose to as low as reasonably achievable.; CTA of the chest was performed after the administration of intravenous contrast.  Multiplanar reformatted images are provided for review.  MIP images are provided for review. Automated exposure control, iterative reconstruction, and/or weight based adjustment of the mA/kV was utilized to reduce the radiation dose to as low as reasonably achievable. COMPARISON: None. HISTORY: ORDERING SYSTEM PROVIDED HISTORY: post arrest TECHNOLOGIST PROVIDED HISTORY: Reason for exam:->post arrest Additional Contrast?->None Decision Support Exception - unselect if not a suspected or confirmed emergency medical condition->Emergency Medical Condition (MA) What reading provider will be dictating this

## 2025-01-28 NOTE — PLAN OF CARE
Problem: ABCDS Injury Assessment  Goal: Absence of physical injury  1/24/2025 0058 by Annalise Bautista RN  Outcome: Progressing  Flowsheets (Taken 1/24/2025 0058)  Absence of Physical Injury: Implement safety measures based on patient assessment  1/23/2025 1157 by Nasir Baker RN  Outcome: Progressing     Problem: Pain  Goal: Verbalizes/displays adequate comfort level or baseline comfort level  1/24/2025 0058 by Annalise Bautista RN  Outcome: Progressing  Flowsheets (Taken 1/24/2025 0058)  Verbalizes/displays adequate comfort level or baseline comfort level:   Assess pain using appropriate pain scale   Administer analgesics based on type and severity of pain and evaluate response   Implement non-pharmacological measures as appropriate and evaluate response   Consider cultural and social influences on pain and pain management   Notify Licensed Independent Practitioner if interventions unsuccessful or patient reports new pain  1/23/2025 1157 by Nasir Baker RN  Outcome: Progressing     Problem: Discharge Planning  Goal: Discharge to home or other facility with appropriate resources  Outcome: Not Progressing  Flowsheets (Taken 1/24/2025 0058)  Discharge to home or other facility with appropriate resources: Identify barriers to discharge with patient and caregiver     Problem: Respiratory - Adult  Goal: Achieves optimal ventilation and oxygenation  1/24/2025 0058 by Annalise Bautista RN  Outcome: Not Progressing  Flowsheets (Taken 1/24/2025 0058)  Achieves optimal ventilation and oxygenation:   Assess for changes in respiratory status   Assess for changes in mentation and behavior   Oxygen supplementation based on oxygen saturation or arterial blood gases   Position to facilitate oxygenation and minimize respiratory effort   Encourage broncho-pulmonary hygiene including cough, deep breathe, incentive spirometry   Assess the need for suctioning and aspirate as needed   Assess and instruct to report shortness of 
  Problem: Discharge Planning  Goal: Discharge to home or other facility with appropriate resources  1/18/2025 0222 by Mita Saucedo, RN  Outcome: Progressing  1/17/2025 1816 by Osman Whalen, RN  Outcome: Not Progressing  Flowsheets (Taken 1/17/2025 0800)  Discharge to home or other facility with appropriate resources:   Identify barriers to discharge with patient and caregiver   Arrange for needed discharge resources and transportation as appropriate   Arrange for interpreters to assist at discharge as needed   Refer to discharge planning if patient needs post-hospital services based on physician order or complex needs related to functional status, cognitive ability or social support system   Identify discharge learning needs (meds, wound care, etc)     
  Problem: Discharge Planning  Goal: Discharge to home or other facility with appropriate resources  1/18/2025 0223 by Mita Saucedo RN  Outcome: Not Progressing     Problem: Respiratory - Adult  Goal: Achieves optimal ventilation and oxygenation  1/18/2025 0222 by Mita Saucedo RN  Outcome: Progressing     Problem: Safety - Adult  Goal: Free from fall injury  1/18/2025 0223 by Mita Saucedo RN  Outcome: Progressing     Problem: Skin/Tissue Integrity  Goal: Absence of new skin breakdown  Description: 1.  Monitor for areas of redness and/or skin breakdown  2.  Assess vascular access sites hourly  3.  Every 4-6 hours minimum:  Change oxygen saturation probe site  4.  Every 4-6 hours:  If on nasal continuous positive airway pressure, respiratory therapy assess nares and determine need for appliance change or resting period.  1/18/2025 0223 by Mita Saucedo RN  Outcome: Progressing     Problem: ABCDS Injury Assessment  Goal: Absence of physical injury  1/18/2025 0223 by Mita Saucedo RN  Outcome: Progressing     Problem: Pain  Goal: Verbalizes/displays adequate comfort level or baseline comfort level  1/18/2025 0223 by Mita Saucedo RN  Outcome: Progressing     Problem: Safety - Medical Restraint  Goal: Remains free of injury from restraints (Restraint for Interference with Medical Device)  Description: INTERVENTIONS:  1. Determine that other, less restrictive measures have been tried or would not be effective before applying the restraint  2. Evaluate the patient's condition at the time of restraint application  3. Inform patient/family regarding the reason for restraint  4. Q2H: Monitor safety, psychosocial status, comfort, nutrition and hydration  1/18/2025 0223 by Mita Suacedo RN  Outcome: Progressing     Problem: Nutrition Deficit:  Goal: Optimize nutritional status  1/18/2025 0223 by Mita Saucedo RN  Outcome: Progressing     Problem: Cardiovascular - Adult  Goal: 
  Problem: Discharge Planning  Goal: Discharge to home or other facility with appropriate resources  1/23/2025 0038 by Annalise Bautista RN  Outcome: Not Progressing  Flowsheets (Taken 1/23/2025 0038)  Discharge to home or other facility with appropriate resources:   Identify barriers to discharge with patient and caregiver   Arrange for needed discharge resources and transportation as appropriate     Problem: Safety - Adult  Goal: Free from fall injury  1/23/2025 1157 by Nasir Baker RN  Outcome: Progressing  1/23/2025 0038 by Annalise Bautista RN  Outcome: Progressing  Flowsheets (Taken 1/23/2025 0038)  Free From Fall Injury:   Instruct family/caregiver on patient safety   Based on caregiver fall risk screen, instruct family/caregiver to ask for assistance with transferring infant if caregiver noted to have fall risk factors     Problem: Skin/Tissue Integrity  Goal: Absence of new skin breakdown  Description: 1.  Monitor for areas of redness and/or skin breakdown  2.  Assess vascular access sites hourly  3.  Every 4-6 hours minimum:  Change oxygen saturation probe site  4.  Every 4-6 hours:  If on nasal continuous positive airway pressure, respiratory therapy assess nares and determine need for appliance change or resting period.  1/23/2025 1157 by Nasir Baker RN  Outcome: Progressing  1/23/2025 0038 by Annalise Bautista RN  Outcome: Progressing     Problem: ABCDS Injury Assessment  Goal: Absence of physical injury  1/23/2025 1157 by Nasir Baker RN  Outcome: Progressing  1/23/2025 0038 by Annalise Bautista RN  Outcome: Progressing  Flowsheets (Taken 1/23/2025 0038)  Absence of Physical Injury: Implement safety measures based on patient assessment     Problem: Pain  Goal: Verbalizes/displays adequate comfort level or baseline comfort level  1/23/2025 1157 by Nasir Baker RN  Outcome: Progressing  1/23/2025 0038 by Annalise Bautista RN  Outcome: Progressing  Flowsheets (Taken 1/23/2025 
  Problem: Discharge Planning  Goal: Discharge to home or other facility with appropriate resources  Outcome: Not Progressing     Problem: Respiratory - Adult  Goal: Achieves optimal ventilation and oxygenation  1/24/2025 2018 by Victor Manuel Duque RN  Outcome: Not Progressing  1/24/2025 1606 by Hillary Junior JAVI  Outcome: Progressing  1/24/2025 1106 by Nasir Baker RN  Outcome: Progressing     Problem: Neurosensory - Adult  Goal: Achieves stable or improved neurological status  1/24/2025 2018 by Victor Manuel Duque RN  Outcome: Not Progressing  1/24/2025 1106 by Nasir Baker RN  Outcome: Progressing  Goal: Achieves maximal functionality and self care  1/24/2025 2018 by Victor Manuel Duque RN  Outcome: Not Progressing  1/24/2025 1106 by Nasir Baker RN  Outcome: Progressing     Problem: Musculoskeletal - Adult  Goal: Return ADL status to a safe level of function  1/24/2025 2018 by Victor Manuel Duque RN  Outcome: Not Progressing  1/24/2025 1106 by Nasir Baker RN  Outcome: Progressing     
  Problem: Discharge Planning  Goal: Discharge to home or other facility with appropriate resources  Outcome: Not Progressing     Problem: Safety - Adult  Goal: Free from fall injury  Outcome: Progressing     Problem: Skin/Tissue Integrity  Goal: Absence of new skin breakdown  Description: 1.  Monitor for areas of redness and/or skin breakdown  2.  Assess vascular access sites hourly  3.  Every 4-6 hours minimum:  Change oxygen saturation probe site  4.  Every 4-6 hours:  If on nasal continuous positive airway pressure, respiratory therapy assess nares and determine need for appliance change or resting period.  Outcome: Progressing     Problem: ABCDS Injury Assessment  Goal: Absence of physical injury  Outcome: Progressing     Problem: Pain  Goal: Verbalizes/displays adequate comfort level or baseline comfort level  Outcome: Progressing     Problem: Safety - Medical Restraint  Goal: Remains free of injury from restraints (Restraint for Interference with Medical Device)  Description: INTERVENTIONS:  1. Determine that other, less restrictive measures have been tried or would not be effective before applying the restraint  2. Evaluate the patient's condition at the time of restraint application  3. Inform patient/family regarding the reason for restraint  4. Q2H: Monitor safety, psychosocial status, comfort, nutrition and hydration  Outcome: Progressing     Problem: Respiratory - Adult  Goal: Achieves optimal ventilation and oxygenation  Outcome: Progressing     Problem: Nutrition Deficit:  Goal: Optimize nutritional status  Outcome: Progressing     
  Problem: Discharge Planning  Goal: Discharge to home or other facility with appropriate resources  Outcome: Not Progressing  Flowsheets (Taken 1/17/2025 0800)  Discharge to home or other facility with appropriate resources:   Identify barriers to discharge with patient and caregiver   Arrange for needed discharge resources and transportation as appropriate   Arrange for interpreters to assist at discharge as needed   Refer to discharge planning if patient needs post-hospital services based on physician order or complex needs related to functional status, cognitive ability or social support system   Identify discharge learning needs (meds, wound care, etc)     Problem: Safety - Adult  Goal: Free from fall injury  Outcome: Progressing     Problem: Skin/Tissue Integrity  Goal: Absence of new skin breakdown  Description: 1.  Monitor for areas of redness and/or skin breakdown  2.  Assess vascular access sites hourly  3.  Every 4-6 hours minimum:  Change oxygen saturation probe site  4.  Every 4-6 hours:  If on nasal continuous positive airway pressure, respiratory therapy assess nares and determine need for appliance change or resting period.  Outcome: Progressing     Problem: ABCDS Injury Assessment  Goal: Absence of physical injury  Outcome: Progressing     Problem: Pain  Goal: Verbalizes/displays adequate comfort level or baseline comfort level  Outcome: Progressing     Problem: Safety - Medical Restraint  Goal: Remains free of injury from restraints (Restraint for Interference with Medical Device)  Description: INTERVENTIONS:  1. Determine that other, less restrictive measures have been tried or would not be effective before applying the restraint  2. Evaluate the patient's condition at the time of restraint application  3. Inform patient/family regarding the reason for restraint  4. Q2H: Monitor safety, psychosocial status, comfort, nutrition and hydration  Outcome: Progressing     Problem: Respiratory - 
  Problem: Nutrition Deficit:  Goal: Optimize nutritional status  Outcome: Progressing  Flowsheets  Taken 1/23/2025 0038 by Annalise Bautista, RN  Nutrient intake appropriate for improving, restoring, or maintaining nutritional needs:   Assess nutritional status and recommend course of action   Recommend, monitor, and adjust tube feedings and TPN/PPN based on assessed needs   Monitor oral intake, labs, and treatment plans  Taken 1/22/2025 1249 by Cira Boyer, MS, RD, LD  Nutrient intake appropriate for improving, restoring, or maintaining nutritional needs: Recommend, monitor, and adjust tube feedings and TPN/PPN based on assessed needs     Problem: Cardiovascular - Adult  Goal: Maintains optimal cardiac output and hemodynamic stability  Outcome: Progressing  Flowsheets (Taken 1/23/2025 0038)  Maintains optimal cardiac output and hemodynamic stability:   Monitor blood pressure and heart rate   Monitor urine output and notify Licensed Independent Practitioner for values outside of normal range   Assess for signs of decreased cardiac output   Administer fluid and/or volume expanders as ordered     Problem: Metabolic/Fluid and Electrolytes - Adult  Goal: Electrolytes maintained within normal limits  Outcome: Progressing  Flowsheets (Taken 1/23/2025 0038)  Electrolytes maintained within normal limits:   Monitor labs and assess patient for signs and symptoms of electrolyte imbalances   Monitor response to electrolyte replacements, including repeat lab results as appropriate   Administer electrolyte replacement as ordered     Problem: Metabolic/Fluid and Electrolytes - Adult  Goal: Hemodynamic stability and optimal renal function maintained  Outcome: Progressing  Flowsheets (Taken 1/23/2025 0038)  Hemodynamic stability and optimal renal function maintained:   Monitor labs and assess for signs and symptoms of volume excess or deficit   Monitor intake, output and patient weight   Monitor urine specific gravity, serum 
  Problem: Respiratory - Adult  Goal: Achieves optimal ventilation and oxygenation  1/15/2025 0831 by Danielle Collins, RCP  Outcome: Progressing  Flowsheets (Taken 1/15/2025 0831)  Achieves optimal ventilation and oxygenation:   Assess for changes in respiratory status   Assess for changes in mentation and behavior   Position to facilitate oxygenation and minimize respiratory effort   Oxygen supplementation based on oxygen saturation or arterial blood gases   Assess the need for suctioning and aspirate as needed   Respiratory therapy support as indicated  Note: Wean FiO2 as tolerated      
  Problem: Respiratory - Adult  Goal: Achieves optimal ventilation and oxygenation  1/23/2025 2050 by Ela Davies RCP  Outcome: Progressing     
  Problem: Respiratory - Adult  Goal: Achieves optimal ventilation and oxygenation  1/24/2025 1606 by Hillary Junior, P  Outcome: Progressing    Able to tolerate a Decrease in PSV today to a value of PSV 10  
  Problem: Respiratory - Adult  Goal: Achieves optimal ventilation and oxygenation  Outcome: Not Progressing     Problem: Metabolic/Fluid and Electrolytes - Adult  Goal: Electrolytes maintained within normal limits  Outcome: Not Progressing  Goal: Hemodynamic stability and optimal renal function maintained  Outcome: Not Progressing     Problem: Neurosensory - Adult  Goal: Achieves stable or improved neurological status  Outcome: Not Progressing  Goal: Achieves maximal functionality and self care  Outcome: Not Progressing     Problem: Musculoskeletal - Adult  Goal: Return mobility to safest level of function  Outcome: Not Progressing  Goal: Return ADL status to a safe level of function  Outcome: Not Progressing     Problem: Gastrointestinal - Adult  Goal: Maintains or returns to baseline bowel function  Outcome: Not Progressing     
  Problem: Respiratory - Adult  Goal: Achieves optimal ventilation and oxygenation  Outcome: Progressing     
  Problem: Respiratory - Adult  Goal: Achieves optimal ventilation and oxygenation  Outcome: Progressing   Pts RSBI on PSV 8 was between 120--130 Per Dr Alfonso increase PS and let pt work her Lungs for the day  Pt placed on PSV 12 for the day RSBI  50--75 now  Nif Done   Highest value was at -18   
  Problem: Safety - Adult  Goal: Free from fall injury  1/18/2025 0957 by Dheeraj Lopez RN  Outcome: Progressing  1/18/2025 0223 by Mita Saucedo RN  Outcome: Progressing  1/18/2025 0222 by Mita Saucedo RN  Outcome: Progressing     Problem: Discharge Planning  Goal: Discharge to home or other facility with appropriate resources  1/18/2025 0957 by Dheeraj Lopez RN  Outcome: Progressing  1/18/2025 0223 by Mita Saucedo RN  Outcome: Not Progressing  1/18/2025 0222 by Mita Saucedo RN  Outcome: Progressing     Problem: Skin/Tissue Integrity  Goal: Absence of new skin breakdown  Description: 1.  Monitor for areas of redness and/or skin breakdown  2.  Assess vascular access sites hourly  3.  Every 4-6 hours minimum:  Change oxygen saturation probe site  4.  Every 4-6 hours:  If on nasal continuous positive airway pressure, respiratory therapy assess nares and determine need for appliance change or resting period.  1/18/2025 0957 by Dheeraj Lopez RN  Outcome: Progressing  1/18/2025 0223 by Mita Saucedo RN  Outcome: Progressing  1/18/2025 0222 by Mita Saucedo RN  Outcome: Progressing     Problem: ABCDS Injury Assessment  Goal: Absence of physical injury  1/18/2025 0957 by Dheeraj Lopez RN  Outcome: Progressing  1/18/2025 0223 by Mita Saucedo RN  Outcome: Progressing  1/18/2025 0222 by Mita Saucedo RN  Outcome: Progressing     Problem: Pain  Goal: Verbalizes/displays adequate comfort level or baseline comfort level  1/18/2025 0957 by Dheeraj Lopez RN  Outcome: Progressing  1/18/2025 0223 by Mita Saucedo RN  Outcome: Progressing  1/18/2025 0222 by Mita Saucedo RN  Outcome: Progressing     Problem: Safety - Medical Restraint  Goal: Remains free of injury from restraints (Restraint for Interference with Medical Device)  Description: INTERVENTIONS:  1. Determine that other, less restrictive measures have been tried or would not be effective 
  Problem: Safety - Adult  Goal: Free from fall injury  1/18/2025 2039 by Roz Rabago RN  Outcome: Progressing  Flowsheets (Taken 1/18/2025 2000)  Free From Fall Injury: Instruct family/caregiver on patient safety     Problem: Discharge Planning  Goal: Discharge to home or other facility with appropriate resources  1/18/2025 2039 by Roz Rabago RN  Outcome: Progressing  Flowsheets (Taken 1/18/2025 1010 by Dheeraj Lopez RN)  Discharge to home or other facility with appropriate resources: Arrange for needed discharge resources and transportation as appropriate     Problem: Skin/Tissue Integrity  Goal: Absence of new skin breakdown  Description: 1.  Monitor for areas of redness and/or skin breakdown  2.  Assess vascular access sites hourly  3.  Every 4-6 hours minimum:  Change oxygen saturation probe site  4.  Every 4-6 hours:  If on nasal continuous positive airway pressure, respiratory therapy assess nares and determine need for appliance change or resting period.  1/18/2025 2039 by Roz Rabago RN  Outcome: Progressing     Problem: ABCDS Injury Assessment  Goal: Absence of physical injury  1/18/2025 2039 by Roz Rabago RN  Outcome: Progressing  Flowsheets (Taken 1/18/2025 2000)  Absence of Physical Injury: Implement safety measures based on patient assessment     Problem: Pain  Goal: Verbalizes/displays adequate comfort level or baseline comfort level  1/18/2025 2039 by Roz Rabago RN  Outcome: Progressing  Flowsheets  Taken 1/18/2025 1632 by Dheeraj Lopez RN  Verbalizes/displays adequate comfort level or baseline comfort level: Assess pain using appropriate pain scale  Taken 1/18/2025 1500 by Dheeraj Lopez RN  Verbalizes/displays adequate comfort level or baseline comfort level: Notify Licensed Independent Practitioner if interventions unsuccessful or patient reports new pain     Problem: Safety - Medical Restraint  Goal: Remains free of injury from restraints (Restraint for 
  Problem: Safety - Adult  Goal: Free from fall injury  1/19/2025 2056 by Daysi Miguel RN  Outcome: Progressing  1/19/2025 0937 by Dheeraj Lopez RN  Outcome: Progressing     Problem: Discharge Planning  Goal: Discharge to home or other facility with appropriate resources  1/19/2025 2056 by Daysi Miguel RN  Outcome: Progressing  1/19/2025 0937 by Dheeraj Lopez RN  Outcome: Progressing     Problem: Skin/Tissue Integrity  Goal: Absence of new skin breakdown  Description: 1.  Monitor for areas of redness and/or skin breakdown  2.  Assess vascular access sites hourly  3.  Every 4-6 hours minimum:  Change oxygen saturation probe site  4.  Every 4-6 hours:  If on nasal continuous positive airway pressure, respiratory therapy assess nares and determine need for appliance change or resting period.  1/19/2025 2056 by Daysi Miguel RN  Outcome: Progressing  1/19/2025 0937 by Dheeraj Lopez RN  Outcome: Progressing     Problem: ABCDS Injury Assessment  Goal: Absence of physical injury  1/19/2025 2056 by Daysi Miguel RN  Outcome: Progressing  1/19/2025 0937 by Dheeraj Lopez RN  Outcome: Progressing     Problem: Pain  Goal: Verbalizes/displays adequate comfort level or baseline comfort level  1/19/2025 2056 by Daysi Miguel RN  Outcome: Progressing  Flowsheets (Taken 1/19/2025 1229 by Dheeraj Lopez, RN)  Verbalizes/displays adequate comfort level or baseline comfort level: Encourage patient to monitor pain and request assistance  1/19/2025 0937 by Dheeraj Lopez RN  Outcome: Progressing     Problem: Safety - Medical Restraint  Goal: Remains free of injury from restraints (Restraint for Interference with Medical Device)  Description: INTERVENTIONS:  1. Determine that other, less restrictive measures have been tried or would not be effective before applying the restraint  2. Evaluate the patient's condition at the time of restraint application  3. Inform 
  Problem: Safety - Adult  Goal: Free from fall injury  1/26/2025 0214 by Kaya Bush RN  Outcome: Progressing     Problem: Discharge Planning  Goal: Discharge to home or other facility with appropriate resources  1/26/2025 0214 by Kaya Bush RN  Outcome: Progressing     Problem: ABCDS Injury Assessment  Goal: Absence of physical injury  1/26/2025 0214 by Kaya Bush RN  Outcome: Progressing     Problem: Safety - Medical Restraint  Goal: Remains free of injury from restraints (Restraint for Interference with Medical Device)  Description: INTERVENTIONS:  1. Determine that other, less restrictive measures have been tried or would not be effective before applying the restraint  2. Evaluate the patient's condition at the time of restraint application  3. Inform patient/family regarding the reason for restraint  4. Q2H: Monitor safety, psychosocial status, comfort, nutrition and hydration  1/26/2025 0214 by Kaya Bush RN  Outcome: Progressing  Flowsheets  Taken 1/26/2025 0000  Remains free of injury from restraints (restraint for interference with medical device): Every 2 hours: Monitor safety, psychosocial status, comfort, nutrition and hydration  Taken 1/25/2025 2200  Remains free of injury from restraints (restraint for interference with medical device):   Determine that other, less restrictive measures have been tried or would not be effective before applying the restraint   Evaluate the patient's condition at the time of restraint application   Inform patient/family regarding the reason for restraint   Every 2 hours: Monitor safety, psychosocial status, comfort, nutrition and hydration  Taken 1/25/2025 2000  Remains free of injury from restraints (restraint for interference with medical device): Every 2 hours: Monitor safety, psychosocial status, comfort, nutrition and hydration     Problem: Cardiovascular - Adult  Goal: Maintains optimal cardiac output and hemodynamic stability  1/26/2025 0214 by 
  Problem: Safety - Adult  Goal: Free from fall injury  1/26/2025 1445 by Ines García  Outcome: Progressing     Problem: Discharge Planning  Goal: Discharge to home or other facility with appropriate resources  1/26/2025 1445 by Ines García  Outcome: Progressing  Flowsheets  Taken 1/26/2025 1200  Discharge to home or other facility with appropriate resources: Identify barriers to discharge with patient and caregiver  Taken 1/26/2025 0800  Discharge to home or other facility with appropriate resources: Identify barriers to discharge with patient and caregiver     Problem: Skin/Tissue Integrity  Goal: Absence of new skin breakdown  Description: 1.  Monitor for areas of redness and/or skin breakdown  2.  Assess vascular access sites hourly  3.  Every 4-6 hours minimum:  Change oxygen saturation probe site  4.  Every 4-6 hours:  If on nasal continuous positive airway pressure, respiratory therapy assess nares and determine need for appliance change or resting period.  Outcome: Progressing     Problem: ABCDS Injury Assessment  Goal: Absence of physical injury  1/26/2025 1445 by Ines García  Outcome: Progressing     Problem: Pain  Goal: Verbalizes/displays adequate comfort level or baseline comfort level  Outcome: Progressing  Flowsheets  Taken 1/26/2025 1200  Verbalizes/displays adequate comfort level or baseline comfort level: Assess pain using appropriate pain scale  Taken 1/26/2025 0800  Verbalizes/displays adequate comfort level or baseline comfort level: Assess pain using appropriate pain scale     Problem: Respiratory - Adult  Goal: Achieves optimal ventilation and oxygenation  Outcome: Progressing     Problem: Cardiovascular - Adult  Goal: Maintains optimal cardiac output and hemodynamic stability  1/26/2025 1445 by Ines García  Outcome: Progressing     Problem: Cardiovascular - Adult  Goal: Absence of cardiac dysrhythmias or at baseline  Outcome: Progressing     Problem: Metabolic/Fluid and Electrolytes - 
  Problem: Safety - Adult  Goal: Free from fall injury  1/27/2025 2218 by Yue Armando, RN  Outcome: Progressing  Flowsheets (Taken 1/24/2025 0058 by Annalise Bautista, RN)  Free From Fall Injury:   Based on caregiver fall risk screen, instruct family/caregiver to ask for assistance with transferring infant if caregiver noted to have fall risk factors   Instruct family/caregiver on patient safety  1/27/2025 1458 by Ines García  Outcome: Progressing     Problem: Discharge Planning  Goal: Discharge to home or other facility with appropriate resources  1/27/2025 2218 by Yue Armanod, RN  Outcome: Progressing  Flowsheets (Taken 1/27/2025 2218)  Discharge to home or other facility with appropriate resources:   Identify barriers to discharge with patient and caregiver   Arrange for interpreters to assist at discharge as needed   Identify discharge learning needs (meds, wound care, etc)   Arrange for needed discharge resources and transportation as appropriate   Refer to discharge planning if patient needs post-hospital services based on physician order or complex needs related to functional status, cognitive ability or social support system  1/27/2025 1458 by Ines García  Outcome: Progressing     Problem: Skin/Tissue Integrity  Goal: Absence of new skin breakdown  Description: 1.  Monitor for areas of redness and/or skin breakdown  2.  Assess vascular access sites hourly  3.  Every 4-6 hours minimum:  Change oxygen saturation probe site  4.  Every 4-6 hours:  If on nasal continuous positive airway pressure, respiratory therapy assess nares and determine need for appliance change or resting period.  1/27/2025 2218 by Yue Armando, RN  Outcome: Progressing  1/27/2025 1458 by Ines García  Outcome: Progressing     Problem: ABCDS Injury Assessment  Goal: Absence of physical injury  1/27/2025 2218 by Yue Armando, RN  Outcome: Progressing  Flowsheets (Taken 1/24/2025 0058 by Annalise Bautista, RN)  Absence of Physical Injury: 
  Problem: Safety - Adult  Goal: Free from fall injury  Outcome: Progressing     Problem: Discharge Planning  Goal: Discharge to home or other facility with appropriate resources  Outcome: Progressing     Problem: Skin/Tissue Integrity  Goal: Absence of new skin breakdown  Description: 1.  Monitor for areas of redness and/or skin breakdown  2.  Assess vascular access sites hourly  3.  Every 4-6 hours minimum:  Change oxygen saturation probe site  4.  Every 4-6 hours:  If on nasal continuous positive airway pressure, respiratory therapy assess nares and determine need for appliance change or resting period.  Outcome: Progressing     Problem: ABCDS Injury Assessment  Goal: Absence of physical injury  Outcome: Progressing     Problem: Pain  Goal: Verbalizes/displays adequate comfort level or baseline comfort level  Outcome: Progressing  Flowsheets (Taken 1/27/2025 1200)  Verbalizes/displays adequate comfort level or baseline comfort level: Assess pain using appropriate pain scale     Problem: Safety - Medical Restraint  Goal: Remains free of injury from restraints (Restraint for Interference with Medical Device)  Description: INTERVENTIONS:  1. Determine that other, less restrictive measures have been tried or would not be effective before applying the restraint  2. Evaluate the patient's condition at the time of restraint application  3. Inform patient/family regarding the reason for restraint  4. Q2H: Monitor safety, psychosocial status, comfort, nutrition and hydration  Outcome: Progressing  Flowsheets  Taken 1/27/2025 1200 by Ines García  Remains free of injury from restraints (restraint for interference with medical device): Every 2 hours: Monitor safety, psychosocial status, comfort, nutrition and hydration  Taken 1/27/2025 0800 by Ines García  Remains free of injury from restraints (restraint for interference with medical device): Every 2 hours: Monitor safety, psychosocial status, comfort, nutrition and 
  Problem: Safety - Adult  Goal: Free from fall injury  Outcome: Progressing     Problem: Discharge Planning  Goal: Discharge to home or other facility with appropriate resources  Outcome: Progressing     Problem: Skin/Tissue Integrity  Goal: Absence of new skin breakdown  Description: 1.  Monitor for areas of redness and/or skin breakdown  2.  Assess vascular access sites hourly  3.  Every 4-6 hours minimum:  Change oxygen saturation probe site  4.  Every 4-6 hours:  If on nasal continuous positive airway pressure, respiratory therapy assess nares and determine need for appliance change or resting period.  Outcome: Progressing     Problem: Pain  Goal: Verbalizes/displays adequate comfort level or baseline comfort level  Outcome: Progressing     Problem: Safety - Medical Restraint  Goal: Remains free of injury from restraints (Restraint for Interference with Medical Device)  Description: INTERVENTIONS:  1. Determine that other, less restrictive measures have been tried or would not be effective before applying the restraint  2. Evaluate the patient's condition at the time of restraint application  3. Inform patient/family regarding the reason for restraint  4. Q2H: Monitor safety, psychosocial status, comfort, nutrition and hydration  Outcome: Progressing  Flowsheets  Taken 1/16/2025 0000  Remains free of injury from restraints (restraint for interference with medical device): Every 2 hours: Monitor safety, psychosocial status, comfort, nutrition and hydration  Taken 1/15/2025 2200  Remains free of injury from restraints (restraint for interference with medical device): Every 2 hours: Monitor safety, psychosocial status, comfort, nutrition and hydration  Taken 1/15/2025 2000  Remains free of injury from restraints (restraint for interference with medical device): Every 2 hours: Monitor safety, psychosocial status, comfort, nutrition and hydration     
  Problem: Safety - Adult  Goal: Free from fall injury  Outcome: Progressing  Flowsheets (Taken 1/14/2025 0128)  Free From Fall Injury: Instruct family/caregiver on patient safety     Problem: Discharge Planning  Goal: Discharge to home or other facility with appropriate resources  Outcome: Progressing     Problem: Skin/Tissue Integrity  Goal: Absence of new skin breakdown  Description: 1.  Monitor for areas of redness and/or skin breakdown  2.  Assess vascular access sites hourly  3.  Every 4-6 hours minimum:  Change oxygen saturation probe site  4.  Every 4-6 hours:  If on nasal continuous positive airway pressure, respiratory therapy assess nares and determine need for appliance change or resting period.  Outcome: Progressing     Problem: ABCDS Injury Assessment  Goal: Absence of physical injury  Outcome: Progressing     
  Problem: Safety - Adult  Goal: Free from fall injury  Outcome: Progressing  Flowsheets (Taken 1/14/2025 2217)  Free From Fall Injury:   Instruct family/caregiver on patient safety   Based on caregiver fall risk screen, instruct family/caregiver to ask for assistance with transferring infant if caregiver noted to have fall risk factors     Problem: Discharge Planning  Goal: Discharge to home or other facility with appropriate resources  Outcome: Progressing  Flowsheets (Taken 1/14/2025 2217)  Discharge to home or other facility with appropriate resources:   Identify barriers to discharge with patient and caregiver   Arrange for needed discharge resources and transportation as appropriate   Identify discharge learning needs (meds, wound care, etc)   Arrange for interpreters to assist at discharge as needed   Refer to discharge planning if patient needs post-hospital services based on physician order or complex needs related to functional status, cognitive ability or social support system     Problem: Skin/Tissue Integrity  Goal: Absence of new skin breakdown  Description: 1.  Monitor for areas of redness and/or skin breakdown  2.  Assess vascular access sites hourly  3.  Every 4-6 hours minimum:  Change oxygen saturation probe site  4.  Every 4-6 hours:  If on nasal continuous positive airway pressure, respiratory therapy assess nares and determine need for appliance change or resting period.  Outcome: Progressing     Problem: ABCDS Injury Assessment  Goal: Absence of physical injury  Outcome: Progressing  Flowsheets (Taken 1/14/2025 2217)  Absence of Physical Injury: Implement safety measures based on patient assessment     Problem: Pain  Goal: Verbalizes/displays adequate comfort level or baseline comfort level  Outcome: Progressing  Flowsheets (Taken 1/14/2025 2217)  Verbalizes/displays adequate comfort level or baseline comfort level:   Notify Licensed Independent Practitioner if interventions unsuccessful or 
  Problem: Safety - Adult  Goal: Free from fall injury  Outcome: Progressing  Flowsheets (Taken 1/21/2025 2000)  Free From Fall Injury: Instruct family/caregiver on patient safety     Problem: Discharge Planning  Goal: Discharge to home or other facility with appropriate resources  Outcome: Progressing  Flowsheets (Taken 1/21/2025 2000)  Discharge to home or other facility with appropriate resources: Identify barriers to discharge with patient and caregiver     Problem: Skin/Tissue Integrity  Goal: Absence of new skin breakdown  Description: 1.  Monitor for areas of redness and/or skin breakdown  2.  Assess vascular access sites hourly  3.  Every 4-6 hours minimum:  Change oxygen saturation probe site  4.  Every 4-6 hours:  If on nasal continuous positive airway pressure, respiratory therapy assess nares and determine need for appliance change or resting period.  Outcome: Progressing     Problem: ABCDS Injury Assessment  Goal: Absence of physical injury  Outcome: Progressing  Flowsheets (Taken 1/21/2025 2000)  Absence of Physical Injury: Implement safety measures based on patient assessment     Problem: Pain  Goal: Verbalizes/displays adequate comfort level or baseline comfort level  Outcome: Progressing  Flowsheets (Taken 1/21/2025 2000)  Verbalizes/displays adequate comfort level or baseline comfort level:   Encourage patient to monitor pain and request assistance   Assess pain using appropriate pain scale   Administer analgesics based on type and severity of pain and evaluate response     Problem: Safety - Medical Restraint  Goal: Remains free of injury from restraints (Restraint for Interference with Medical Device)  Description: INTERVENTIONS:  1. Determine that other, less restrictive measures have been tried or would not be effective before applying the restraint  2. Evaluate the patient's condition at the time of restraint application  3. Inform patient/family regarding the reason for restraint  4. Q2H: 
Needs antibiotics script before discharge  
Patient remains in bilateral soft wrist restraints d/t continued interference with lines/tubes/cords that are necessary for providing safe care. Patient is not receptive to teaching and education on importance of such devices.     Problem: Safety - Medical Restraint  Goal: Remains free of injury from restraints (Restraint for Interference with Medical Device)  Description: INTERVENTIONS:  1. Determine that other, less restrictive measures have been tried or would not be effective before applying the restraint  2. Evaluate the patient's condition at the time of restraint application  3. Inform patient/family regarding the reason for restraint  4. Q2H: Monitor safety, psychosocial status, comfort, nutrition and hydration  1/24/2025 0058 by Annalise Bautista, RN  Outcome: Progressing  Flowsheets (Taken 1/24/2025 0058)  Remains free of injury from restraints (restraint for interference with medical device):   Determine that other, less restrictive measures have been tried or would not be effective before applying the restraint   Evaluate the patient's condition at the time of restraint application   Every 2 hours: Monitor safety, psychosocial status, comfort, nutrition and hydration   Inform patient/family regarding the reason for restraint  1/23/2025 1157 by Nasir Baker, RN  Outcome: Progressing     
Patient remains in bilateral soft wrist restraints d/t continued interference with lines/tubes/cords that are necessary for providing safe care. Patient is not receptive to teaching and education on importance of such devices.     Problem: Safety - Medical Restraint  Goal: Remains free of injury from restraints (Restraint for Interference with Medical Device)  Description: INTERVENTIONS:  1. Determine that other, less restrictive measures have been tried or would not be effective before applying the restraint  2. Evaluate the patient's condition at the time of restraint application  3. Inform patient/family regarding the reason for restraint  4. Q2H: Monitor safety, psychosocial status, comfort, nutrition and hydration  Outcome: Progressing  Flowsheets (Taken 1/23/2025 0038)  Remains free of injury from restraints (restraint for interference with medical device):   Every 2 hours: Monitor safety, psychosocial status, comfort, nutrition and hydration   Evaluate the patient's condition at the time of restraint application   Determine that other, less restrictive measures have been tried or would not be effective before applying the restraint   Inform patient/family regarding the reason for restraint     
device):   Determine that other, less restrictive measures have been tried or would not be effective before applying the restraint   Evaluate the patient's condition at the time of restraint application   Inform patient/family regarding the reason for restraint   Every 2 hours: Monitor safety, psychosocial status, comfort, nutrition and hydration  Taken 1/26/2025 1800 by Ines García  Remains free of injury from restraints (restraint for interference with medical device): Determine that other, less restrictive measures have been tried or would not be effective before applying the restraint  Taken 1/26/2025 1600 by Ines García  Remains free of injury from restraints (restraint for interference with medical device): Determine that other, less restrictive measures have been tried or would not be effective before applying the restraint     Problem: Respiratory - Adult  Goal: Achieves optimal ventilation and oxygenation  1/26/2025 2118 by Kaya Bush, RN  Outcome: Not Progressing     Problem: Respiratory - Adult  Goal: Achieves optimal ventilation and oxygenation  1/26/2025 2118 by Kaya Bush, RN  Outcome: Not Progressing  1/26/2025 1445 by Ines García  Outcome: Progressing     
using appropriate pain scale   Administer analgesics based on type and severity of pain and evaluate response   Encourage patient to monitor pain and request assistance     Problem: Safety - Medical Restraint  Goal: Remains free of injury from restraints (Restraint for Interference with Medical Device)  Description: INTERVENTIONS:  1. Determine that other, less restrictive measures have been tried or would not be effective before applying the restraint  2. Evaluate the patient's condition at the time of restraint application  3. Inform patient/family regarding the reason for restraint  4. Q2H: Monitor safety, psychosocial status, comfort, nutrition and hydration  1/15/2025 0926 by Krzysztof Delacruz RN  Outcome: Progressing  1/14/2025 2217 by Yue Armando RN  Outcome: Progressing  Flowsheets  Taken 1/14/2025 2200  Remains free of injury from restraints (restraint for interference with medical device):   Determine that other, less restrictive measures have been tried or would not be effective before applying the restraint   Evaluate the patient's condition at the time of restraint application   Inform patient/family regarding the reason for restraint   Every 2 hours: Monitor safety, psychosocial status, comfort, nutrition and hydration  Taken 1/14/2025 2000  Remains free of injury from restraints (restraint for interference with medical device):   Determine that other, less restrictive measures have been tried or would not be effective before applying the restraint   Evaluate the patient's condition at the time of restraint application   Inform patient/family regarding the reason for restraint   Every 2 hours: Monitor safety, psychosocial status, comfort, nutrition and hydration     Problem: Respiratory - Adult  Goal: Achieves optimal ventilation and oxygenation  1/15/2025 0926 by Krzysztof Delacruz RN  Outcome: Progressing  1/15/2025 0831 by Danielle Collins RCP  Outcome: Progressing  Flowsheets (Taken 1/15/2025 
RN  Outcome: Progressing  1/21/2025 2255 by Roz Rabago RN  Outcome: Progressing  Flowsheets (Taken 1/21/2025 2000)  Maintains optimal cardiac output and hemodynamic stability:   Monitor blood pressure and heart rate   Monitor urine output and notify Licensed Independent Practitioner for values outside of normal range   Assess for signs of decreased cardiac output   Administer fluid and/or volume expanders as ordered  Goal: Absence of cardiac dysrhythmias or at baseline  1/21/2025 2255 by Roz Rabago RN  Outcome: Progressing  Flowsheets (Taken 1/21/2025 2000)  Absence of cardiac dysrhythmias or at baseline: Monitor cardiac rate and rhythm     Problem: Metabolic/Fluid and Electrolytes - Adult  Goal: Electrolytes maintained within normal limits  1/22/2025 0925 by Krzysztof Delacruz RN  Outcome: Progressing  1/21/2025 2255 by Roz Rabago RN  Outcome: Progressing  Flowsheets (Taken 1/21/2025 2000)  Electrolytes maintained within normal limits:   Monitor labs and assess patient for signs and symptoms of electrolyte imbalances   Administer electrolyte replacement as ordered   Monitor response to electrolyte replacements, including repeat lab results as appropriate  Goal: Hemodynamic stability and optimal renal function maintained  1/21/2025 2255 by Roz Rabago RN  Outcome: Progressing  Flowsheets (Taken 1/21/2025 2000)  Hemodynamic stability and optimal renal function maintained:   Monitor labs and assess for signs and symptoms of volume excess or deficit   Monitor intake, output and patient weight   Monitor urine specific gravity, serum osmolarity and serum sodium as indicated or ordered   Monitor response to interventions for patient's volume status, including labs, urine output, blood pressure (other measures as available)  Goal: Glucose maintained within prescribed range  1/21/2025 2255 by Roz Rabago RN  Outcome: Progressing  Flowsheets (Taken 1/21/2025 2000)  Glucose maintained within prescribed 
restraints (restraint for interference with medical device): Determine that other, less restrictive measures have been tried or would not be effective before applying the restraint     Problem: Respiratory - Adult  Goal: Achieves optimal ventilation and oxygenation  1/18/2025 2039 by oRz Rabago RN  Outcome: Progressing  Flowsheets (Taken 1/18/2025 2000)  Achieves optimal ventilation and oxygenation:   Assess for changes in respiratory status   Assess for changes in mentation and behavior   Position to facilitate oxygenation and minimize respiratory effort   Oxygen supplementation based on oxygen saturation or arterial blood gases     Problem: Nutrition Deficit:  Goal: Optimize nutritional status  1/18/2025 2039 by Roz Rabago RN  Outcome: Progressing     Problem: Cardiovascular - Adult  Goal: Maintains optimal cardiac output and hemodynamic stability  1/18/2025 2039 by Roz Rabago RN  Outcome: Progressing  Flowsheets (Taken 1/18/2025 2000)  Maintains optimal cardiac output and hemodynamic stability:   Monitor blood pressure and heart rate   Monitor urine output and notify Licensed Independent Practitioner for values outside of normal range   Assess for signs of decreased cardiac output   Administer fluid and/or volume expanders as ordered  Goal: Absence of cardiac dysrhythmias or at baseline  1/18/2025 2039 by Roz Rabago RN  Outcome: Progressing  Flowsheets (Taken 1/18/2025 2000)  Absence of cardiac dysrhythmias or at baseline:   Monitor cardiac rate and rhythm   Assess for signs of decreased cardiac output     Problem: Metabolic/Fluid and Electrolytes - Adult  Goal: Electrolytes maintained within normal limits  1/18/2025 2039 by Roz Rabago RN  Outcome: Progressing  Flowsheets  Taken 1/18/2025 2000 by Roz Rabago RN  Electrolytes maintained within normal limits:   Monitor labs and assess patient for signs and symptoms of electrolyte imbalances   Administer electrolyte replacement as 
Progressing  1/18/2025 2039 by Roz Rabago RN  Outcome: Progressing  Flowsheets (Taken 1/18/2025 2000)  Absence of cardiac dysrhythmias or at baseline:   Monitor cardiac rate and rhythm   Assess for signs of decreased cardiac output     Problem: Metabolic/Fluid and Electrolytes - Adult  Goal: Electrolytes maintained within normal limits  1/19/2025 0937 by Dheeraj Lopez RN  Outcome: Progressing  1/18/2025 2039 by Roz Rabago RN  Outcome: Progressing  Flowsheets  Taken 1/18/2025 2000 by Roz Rabago RN  Electrolytes maintained within normal limits:   Monitor labs and assess patient for signs and symptoms of electrolyte imbalances   Administer electrolyte replacement as ordered   Monitor response to electrolyte replacements, including repeat lab results as appropriate   Fluid restriction as ordered  Taken 1/18/2025 1010 by Dheeraj Lopez RN  Electrolytes maintained within normal limits: Monitor labs and assess patient for signs and symptoms of electrolyte imbalances  Goal: Hemodynamic stability and optimal renal function maintained  1/19/2025 0937 by Dheeraj Lopez RN  Outcome: Progressing  1/18/2025 2039 by Roz Rabago RN  Outcome: Progressing  Flowsheets  Taken 1/18/2025 2000 by Roz Rabago RN  Hemodynamic stability and optimal renal function maintained:   Monitor labs and assess for signs and symptoms of volume excess or deficit   Monitor intake, output and patient weight   Monitor urine specific gravity, serum osmolarity and serum sodium as indicated or ordered   Monitor response to interventions for patient's volume status, including labs, urine output, blood pressure (other measures as available)  Taken 1/18/2025 1010 by Dheeraj Lopez RN  Hemodynamic stability and optimal renal function maintained: Monitor labs and assess for signs and symptoms of volume excess or deficit  Goal: Glucose maintained within prescribed range  1/19/2025 0937 by Dheeraj Lopez RN  Outcome: 
Nasir Baker RN  Outcome: Progressing  1/24/2025 0058 by Annalise Bautista RN  Outcome: Not Progressing  Flowsheets (Taken 1/24/2025 0058)  Achieves optimal ventilation and oxygenation:   Assess for changes in respiratory status   Assess for changes in mentation and behavior   Oxygen supplementation based on oxygen saturation or arterial blood gases   Position to facilitate oxygenation and minimize respiratory effort   Encourage broncho-pulmonary hygiene including cough, deep breathe, incentive spirometry   Assess the need for suctioning and aspirate as needed   Assess and instruct to report shortness of breath or any respiratory difficulty   Respiratory therapy support as indicated     Problem: Neurosensory - Adult  Goal: Achieves stable or improved neurological status  1/24/2025 1106 by Nasir Baker RN  Outcome: Progressing  1/24/2025 0058 by Annalise Bautista RN  Outcome: Not Progressing  Flowsheets (Taken 1/24/2025 0058)  Achieves stable or improved neurological status:   Assess for and report changes in neurological status   Initiate measures to prevent increased intracranial pressure   Monitor temperature, glucose, and sodium. Initiate appropriate interventions as ordered   Maintain blood pressure and fluid volume within ordered parameters to optimize cerebral perfusion and minimize risk of hemorrhage  Goal: Achieves maximal functionality and self care  1/24/2025 1106 by Nasir Baker RN  Outcome: Progressing  1/24/2025 0058 by Annalise Bautista RN  Outcome: Not Progressing  Flowsheets (Taken 1/24/2025 0058)  Achieves maximal functionality and self care: Encourage and assist patient to increase activity and self care with guidance from physical therapy/occupational therapy     Problem: Musculoskeletal - Adult  Goal: Return mobility to safest level of function  1/24/2025 1106 by Nasir Baker RN  Outcome: Progressing  1/24/2025 0058 by Annalise Bautista, RN  Outcome: Not Progressing  Flowsheets (Taken

## 2025-01-29 LAB
ANION GAP SERPL CALCULATED.3IONS-SCNC: 12 MMOL/L (ref 7–16)
BUN SERPL-MCNC: 16 MG/DL (ref 6–23)
CALCIUM SERPL-MCNC: 8.7 MG/DL (ref 8.6–10.2)
CHLORIDE SERPL-SCNC: 108 MMOL/L (ref 98–107)
CO2 SERPL-SCNC: 21 MMOL/L (ref 22–29)
CREAT SERPL-MCNC: 0.5 MG/DL (ref 0.5–1)
ERYTHROCYTE [DISTWIDTH] IN BLOOD BY AUTOMATED COUNT: 16.2 % (ref 11.5–15)
GFR, ESTIMATED: >90 ML/MIN/1.73M2
GLUCOSE SERPL-MCNC: 122 MG/DL (ref 74–99)
HCT VFR BLD AUTO: 25.7 % (ref 34–48)
HGB BLD-MCNC: 8.2 G/DL (ref 11.5–15.5)
MCH RBC QN AUTO: 31.9 PG (ref 26–35)
MCHC RBC AUTO-ENTMCNC: 31.9 G/DL (ref 32–34.5)
MCV RBC AUTO: 100 FL (ref 80–99.9)
PLATELET # BLD AUTO: 179 K/UL (ref 130–450)
PMV BLD AUTO: 11.9 FL (ref 7–12)
POTASSIUM SERPL-SCNC: 4 MMOL/L (ref 3.5–5)
RBC # BLD AUTO: 2.57 M/UL (ref 3.5–5.5)
SODIUM SERPL-SCNC: 141 MMOL/L (ref 132–146)
WBC OTHER # BLD: 6.9 K/UL (ref 4.5–11.5)

## 2025-01-29 PROCEDURE — 80048 BASIC METABOLIC PNL TOTAL CA: CPT

## 2025-01-29 PROCEDURE — 85027 COMPLETE CBC AUTOMATED: CPT

## 2025-01-30 LAB
ANION GAP SERPL CALCULATED.3IONS-SCNC: 11 MMOL/L (ref 7–16)
BUN SERPL-MCNC: 14 MG/DL (ref 6–23)
CALCIUM SERPL-MCNC: 8.1 MG/DL (ref 8.6–10.2)
CHLORIDE SERPL-SCNC: 107 MMOL/L (ref 98–107)
CO2 SERPL-SCNC: 23 MMOL/L (ref 22–29)
CREAT SERPL-MCNC: 0.5 MG/DL (ref 0.5–1)
ERYTHROCYTE [DISTWIDTH] IN BLOOD BY AUTOMATED COUNT: 15.7 % (ref 11.5–15)
GFR, ESTIMATED: >90 ML/MIN/1.73M2
GLUCOSE SERPL-MCNC: 124 MG/DL (ref 74–99)
HCT VFR BLD AUTO: 23.9 % (ref 34–48)
HGB BLD-MCNC: 7.6 G/DL (ref 11.5–15.5)
MCH RBC QN AUTO: 31.5 PG (ref 26–35)
MCHC RBC AUTO-ENTMCNC: 31.8 G/DL (ref 32–34.5)
MCV RBC AUTO: 99.2 FL (ref 80–99.9)
PLATELET # BLD AUTO: 143 K/UL (ref 130–450)
PMV BLD AUTO: 11.9 FL (ref 7–12)
POTASSIUM SERPL-SCNC: 4.2 MMOL/L (ref 3.5–5)
RBC # BLD AUTO: 2.41 M/UL (ref 3.5–5.5)
SODIUM SERPL-SCNC: 141 MMOL/L (ref 132–146)
WBC OTHER # BLD: 5.9 K/UL (ref 4.5–11.5)

## 2025-01-30 PROCEDURE — 85027 COMPLETE CBC AUTOMATED: CPT

## 2025-01-30 PROCEDURE — 80048 BASIC METABOLIC PNL TOTAL CA: CPT

## 2025-02-03 LAB
ANION GAP SERPL CALCULATED.3IONS-SCNC: 8 MMOL/L (ref 7–16)
BUN SERPL-MCNC: 15 MG/DL (ref 6–23)
CALCIUM SERPL-MCNC: 8.8 MG/DL (ref 8.6–10.2)
CHLORIDE SERPL-SCNC: 105 MMOL/L (ref 98–107)
CO2 SERPL-SCNC: 29 MMOL/L (ref 22–29)
CREAT SERPL-MCNC: 0.5 MG/DL (ref 0.5–1)
ERYTHROCYTE [DISTWIDTH] IN BLOOD BY AUTOMATED COUNT: 15.5 % (ref 11.5–15)
GFR, ESTIMATED: >90 ML/MIN/1.73M2
GLUCOSE SERPL-MCNC: 95 MG/DL (ref 74–99)
HCT VFR BLD AUTO: 26.6 % (ref 34–48)
HGB BLD-MCNC: 8.4 G/DL (ref 11.5–15.5)
MCH RBC QN AUTO: 31.1 PG (ref 26–35)
MCHC RBC AUTO-ENTMCNC: 31.6 G/DL (ref 32–34.5)
MCV RBC AUTO: 98.5 FL (ref 80–99.9)
PLATELET # BLD AUTO: 168 K/UL (ref 130–450)
PMV BLD AUTO: 12 FL (ref 7–12)
POTASSIUM SERPL-SCNC: 4.4 MMOL/L (ref 3.5–5)
RBC # BLD AUTO: 2.7 M/UL (ref 3.5–5.5)
SODIUM SERPL-SCNC: 142 MMOL/L (ref 132–146)
TSH SERPL DL<=0.05 MIU/L-ACNC: 10.8 UIU/ML (ref 0.27–4.2)
WBC OTHER # BLD: 5.6 K/UL (ref 4.5–11.5)

## 2025-02-03 PROCEDURE — 80048 BASIC METABOLIC PNL TOTAL CA: CPT

## 2025-02-03 PROCEDURE — 84443 ASSAY THYROID STIM HORMONE: CPT

## 2025-02-03 PROCEDURE — 85027 COMPLETE CBC AUTOMATED: CPT

## 2025-02-06 LAB
ANION GAP SERPL CALCULATED.3IONS-SCNC: 9 MMOL/L (ref 7–16)
BUN SERPL-MCNC: 20 MG/DL (ref 6–23)
CALCIUM SERPL-MCNC: 8.8 MG/DL (ref 8.6–10.2)
CHLORIDE SERPL-SCNC: 102 MMOL/L (ref 98–107)
CO2 SERPL-SCNC: 26 MMOL/L (ref 22–29)
CREAT SERPL-MCNC: 0.5 MG/DL (ref 0.5–1)
ERYTHROCYTE [DISTWIDTH] IN BLOOD BY AUTOMATED COUNT: 15.4 % (ref 11.5–15)
GFR, ESTIMATED: >90 ML/MIN/1.73M2
GLUCOSE SERPL-MCNC: 121 MG/DL (ref 74–99)
HCT VFR BLD AUTO: 27.9 % (ref 34–48)
HGB BLD-MCNC: 8.7 G/DL (ref 11.5–15.5)
MCH RBC QN AUTO: 31.2 PG (ref 26–35)
MCHC RBC AUTO-ENTMCNC: 31.2 G/DL (ref 32–34.5)
MCV RBC AUTO: 100 FL (ref 80–99.9)
PLATELET # BLD AUTO: 121 K/UL (ref 130–450)
PMV BLD AUTO: 12.5 FL (ref 7–12)
POTASSIUM SERPL-SCNC: 4.3 MMOL/L (ref 3.5–5)
RBC # BLD AUTO: 2.79 M/UL (ref 3.5–5.5)
SODIUM SERPL-SCNC: 137 MMOL/L (ref 132–146)
WBC OTHER # BLD: 5.2 K/UL (ref 4.5–11.5)

## 2025-02-06 PROCEDURE — 85027 COMPLETE CBC AUTOMATED: CPT

## 2025-02-06 PROCEDURE — 80048 BASIC METABOLIC PNL TOTAL CA: CPT

## 2025-02-10 LAB
ANION GAP SERPL CALCULATED.3IONS-SCNC: 6 MMOL/L (ref 7–16)
BUN SERPL-MCNC: 19 MG/DL (ref 6–23)
CALCIUM SERPL-MCNC: 8.7 MG/DL (ref 8.6–10.2)
CHLORIDE SERPL-SCNC: 106 MMOL/L (ref 98–107)
CO2 SERPL-SCNC: 28 MMOL/L (ref 22–29)
CREAT SERPL-MCNC: 0.5 MG/DL (ref 0.5–1)
ERYTHROCYTE [DISTWIDTH] IN BLOOD BY AUTOMATED COUNT: 15.4 % (ref 11.5–15)
GFR, ESTIMATED: >90 ML/MIN/1.73M2
GLUCOSE SERPL-MCNC: 117 MG/DL (ref 74–99)
HCT VFR BLD AUTO: 28.7 % (ref 34–48)
HGB BLD-MCNC: 9 G/DL (ref 11.5–15.5)
MCH RBC QN AUTO: 31.3 PG (ref 26–35)
MCHC RBC AUTO-ENTMCNC: 31.4 G/DL (ref 32–34.5)
MCV RBC AUTO: 99.7 FL (ref 80–99.9)
PLATELET # BLD AUTO: 112 K/UL (ref 130–450)
PMV BLD AUTO: 12.9 FL (ref 7–12)
POTASSIUM SERPL-SCNC: 4.2 MMOL/L (ref 3.5–5)
RBC # BLD AUTO: 2.88 M/UL (ref 3.5–5.5)
SODIUM SERPL-SCNC: 140 MMOL/L (ref 132–146)
T4 FREE SERPL-MCNC: 1.3 NG/DL (ref 0.9–1.7)
WBC OTHER # BLD: 5.1 K/UL (ref 4.5–11.5)

## 2025-02-10 PROCEDURE — 84439 ASSAY OF FREE THYROXINE: CPT

## 2025-02-10 PROCEDURE — 80048 BASIC METABOLIC PNL TOTAL CA: CPT

## 2025-02-10 PROCEDURE — 85027 COMPLETE CBC AUTOMATED: CPT

## 2025-02-13 LAB
ANION GAP SERPL CALCULATED.3IONS-SCNC: 10 MMOL/L (ref 7–16)
BUN SERPL-MCNC: 14 MG/DL (ref 6–23)
CALCIUM SERPL-MCNC: 8.9 MG/DL (ref 8.6–10.2)
CHLORIDE SERPL-SCNC: 106 MMOL/L (ref 98–107)
CO2 SERPL-SCNC: 27 MMOL/L (ref 22–29)
CREAT SERPL-MCNC: 0.5 MG/DL (ref 0.5–1)
ERYTHROCYTE [DISTWIDTH] IN BLOOD BY AUTOMATED COUNT: 15.4 % (ref 11.5–15)
GFR, ESTIMATED: >90 ML/MIN/1.73M2
GLUCOSE SERPL-MCNC: 110 MG/DL (ref 74–99)
HCT VFR BLD AUTO: 28.2 % (ref 34–48)
HGB BLD-MCNC: 9 G/DL (ref 11.5–15.5)
MCH RBC QN AUTO: 31.5 PG (ref 26–35)
MCHC RBC AUTO-ENTMCNC: 31.9 G/DL (ref 32–34.5)
MCV RBC AUTO: 98.6 FL (ref 80–99.9)
PLATELET # BLD AUTO: 117 K/UL (ref 130–450)
PMV BLD AUTO: 12.7 FL (ref 7–12)
POTASSIUM SERPL-SCNC: 4.5 MMOL/L (ref 3.5–5)
RBC # BLD AUTO: 2.86 M/UL (ref 3.5–5.5)
SODIUM SERPL-SCNC: 143 MMOL/L (ref 132–146)
WBC OTHER # BLD: 5.4 K/UL (ref 4.5–11.5)

## 2025-02-13 PROCEDURE — 85027 COMPLETE CBC AUTOMATED: CPT

## 2025-02-13 PROCEDURE — 80048 BASIC METABOLIC PNL TOTAL CA: CPT

## 2025-02-17 LAB
ANION GAP SERPL CALCULATED.3IONS-SCNC: 5 MMOL/L (ref 7–16)
BUN SERPL-MCNC: 15 MG/DL (ref 6–23)
CALCIUM SERPL-MCNC: 8.8 MG/DL (ref 8.6–10.2)
CHLORIDE SERPL-SCNC: 106 MMOL/L (ref 98–107)
CO2 SERPL-SCNC: 28 MMOL/L (ref 22–29)
CREAT SERPL-MCNC: 0.5 MG/DL (ref 0.5–1)
ERYTHROCYTE [DISTWIDTH] IN BLOOD BY AUTOMATED COUNT: 14.8 % (ref 11.5–15)
GFR, ESTIMATED: >90 ML/MIN/1.73M2
GLUCOSE SERPL-MCNC: 88 MG/DL (ref 74–99)
HCT VFR BLD AUTO: 28.6 % (ref 34–48)
HGB BLD-MCNC: 9.2 G/DL (ref 11.5–15.5)
MCH RBC QN AUTO: 30.9 PG (ref 26–35)
MCHC RBC AUTO-ENTMCNC: 32.2 G/DL (ref 32–34.5)
MCV RBC AUTO: 96 FL (ref 80–99.9)
PLATELET # BLD AUTO: 146 K/UL (ref 130–450)
PMV BLD AUTO: 12.1 FL (ref 7–12)
POTASSIUM SERPL-SCNC: 4.4 MMOL/L (ref 3.5–5)
RBC # BLD AUTO: 2.98 M/UL (ref 3.5–5.5)
SODIUM SERPL-SCNC: 139 MMOL/L (ref 132–146)
WBC OTHER # BLD: 5.9 K/UL (ref 4.5–11.5)

## 2025-02-17 PROCEDURE — 85027 COMPLETE CBC AUTOMATED: CPT

## 2025-02-17 PROCEDURE — 80048 BASIC METABOLIC PNL TOTAL CA: CPT

## 2025-02-20 LAB
ANION GAP SERPL CALCULATED.3IONS-SCNC: 8 MMOL/L (ref 7–16)
BUN SERPL-MCNC: 16 MG/DL (ref 6–23)
CALCIUM SERPL-MCNC: 8.9 MG/DL (ref 8.6–10.2)
CHLORIDE SERPL-SCNC: 105 MMOL/L (ref 98–107)
CO2 SERPL-SCNC: 30 MMOL/L (ref 22–29)
CREAT SERPL-MCNC: 0.5 MG/DL (ref 0.5–1)
ERYTHROCYTE [DISTWIDTH] IN BLOOD BY AUTOMATED COUNT: 14.6 % (ref 11.5–15)
GFR, ESTIMATED: >90 ML/MIN/1.73M2
GLUCOSE SERPL-MCNC: 95 MG/DL (ref 74–99)
HCT VFR BLD AUTO: 30.4 % (ref 34–48)
HGB BLD-MCNC: 9.8 G/DL (ref 11.5–15.5)
MCH RBC QN AUTO: 30.9 PG (ref 26–35)
MCHC RBC AUTO-ENTMCNC: 32.2 G/DL (ref 32–34.5)
MCV RBC AUTO: 95.9 FL (ref 80–99.9)
PLATELET # BLD AUTO: 155 K/UL (ref 130–450)
PMV BLD AUTO: 12.2 FL (ref 7–12)
POTASSIUM SERPL-SCNC: 4.3 MMOL/L (ref 3.5–5)
RBC # BLD AUTO: 3.17 M/UL (ref 3.5–5.5)
SODIUM SERPL-SCNC: 143 MMOL/L (ref 132–146)
WBC OTHER # BLD: 6.3 K/UL (ref 4.5–11.5)

## 2025-02-20 PROCEDURE — 85027 COMPLETE CBC AUTOMATED: CPT

## 2025-02-20 PROCEDURE — 80048 BASIC METABOLIC PNL TOTAL CA: CPT

## 2025-02-22 LAB — TSH SERPL DL<=0.05 MIU/L-ACNC: 3.37 UIU/ML (ref 0.27–4.2)

## 2025-02-22 PROCEDURE — 84443 ASSAY THYROID STIM HORMONE: CPT

## 2025-02-24 LAB
ANION GAP SERPL CALCULATED.3IONS-SCNC: 10 MMOL/L (ref 7–16)
BUN SERPL-MCNC: 17 MG/DL (ref 6–23)
CALCIUM SERPL-MCNC: 9.2 MG/DL (ref 8.6–10.2)
CHLORIDE SERPL-SCNC: 101 MMOL/L (ref 98–107)
CO2 SERPL-SCNC: 29 MMOL/L (ref 22–29)
CREAT SERPL-MCNC: 0.5 MG/DL (ref 0.5–1)
ERYTHROCYTE [DISTWIDTH] IN BLOOD BY AUTOMATED COUNT: 14.3 % (ref 11.5–15)
GFR, ESTIMATED: >90 ML/MIN/1.73M2
GLUCOSE SERPL-MCNC: 109 MG/DL (ref 74–99)
HCT VFR BLD AUTO: 30.4 % (ref 34–48)
HGB BLD-MCNC: 9.8 G/DL (ref 11.5–15.5)
MCH RBC QN AUTO: 30.7 PG (ref 26–35)
MCHC RBC AUTO-ENTMCNC: 32.2 G/DL (ref 32–34.5)
MCV RBC AUTO: 95.3 FL (ref 80–99.9)
PLATELET # BLD AUTO: 156 K/UL (ref 130–450)
PMV BLD AUTO: 12 FL (ref 7–12)
POTASSIUM SERPL-SCNC: 4.1 MMOL/L (ref 3.5–5)
RBC # BLD AUTO: 3.19 M/UL (ref 3.5–5.5)
SODIUM SERPL-SCNC: 140 MMOL/L (ref 132–146)
WBC OTHER # BLD: 6.5 K/UL (ref 4.5–11.5)

## 2025-02-24 PROCEDURE — 80048 BASIC METABOLIC PNL TOTAL CA: CPT

## 2025-02-24 PROCEDURE — 85027 COMPLETE CBC AUTOMATED: CPT

## 2025-03-03 LAB
ALBUMIN SERPL-MCNC: 3.5 G/DL (ref 3.5–5.2)
ALP SERPL-CCNC: 131 U/L (ref 35–104)
ALT SERPL-CCNC: 17 U/L (ref 0–32)
ANION GAP SERPL CALCULATED.3IONS-SCNC: 9 MMOL/L (ref 7–16)
AST SERPL-CCNC: 27 U/L (ref 0–31)
BASOPHILS # BLD: 0.02 K/UL (ref 0–0.2)
BASOPHILS NFR BLD: 0 % (ref 0–2)
BILIRUB SERPL-MCNC: 0.5 MG/DL (ref 0–1.2)
BUN SERPL-MCNC: 18 MG/DL (ref 6–23)
CALCIUM SERPL-MCNC: 9 MG/DL (ref 8.6–10.2)
CHLORIDE SERPL-SCNC: 102 MMOL/L (ref 98–107)
CO2 SERPL-SCNC: 29 MMOL/L (ref 22–29)
CREAT SERPL-MCNC: 0.5 MG/DL (ref 0.5–1)
EOSINOPHIL # BLD: 0.18 K/UL (ref 0.05–0.5)
EOSINOPHILS RELATIVE PERCENT: 3 % (ref 0–6)
ERYTHROCYTE [DISTWIDTH] IN BLOOD BY AUTOMATED COUNT: 14.2 % (ref 11.5–15)
GFR, ESTIMATED: >90 ML/MIN/1.73M2
GLUCOSE SERPL-MCNC: 117 MG/DL (ref 74–99)
HCT VFR BLD AUTO: 31.7 % (ref 34–48)
HGB BLD-MCNC: 10.4 G/DL (ref 11.5–15.5)
IMM GRANULOCYTES # BLD AUTO: <0.03 K/UL (ref 0–0.58)
IMM GRANULOCYTES NFR BLD: 0 % (ref 0–5)
LYMPHOCYTES NFR BLD: 1.5 K/UL (ref 1.5–4)
LYMPHOCYTES RELATIVE PERCENT: 23 % (ref 20–42)
MCH RBC QN AUTO: 31 PG (ref 26–35)
MCHC RBC AUTO-ENTMCNC: 32.8 G/DL (ref 32–34.5)
MCV RBC AUTO: 94.6 FL (ref 80–99.9)
MONOCYTES NFR BLD: 0.59 K/UL (ref 0.1–0.95)
MONOCYTES NFR BLD: 9 % (ref 2–12)
NEUTROPHILS NFR BLD: 64 % (ref 43–80)
NEUTS SEG NFR BLD: 4.15 K/UL (ref 1.8–7.3)
PLATELET # BLD AUTO: 166 K/UL (ref 130–450)
PMV BLD AUTO: 12.2 FL (ref 7–12)
POTASSIUM SERPL-SCNC: 4.2 MMOL/L (ref 3.5–5)
PROT SERPL-MCNC: 6.4 G/DL (ref 6.4–8.3)
RBC # BLD AUTO: 3.35 M/UL (ref 3.5–5.5)
SODIUM SERPL-SCNC: 140 MMOL/L (ref 132–146)
WBC OTHER # BLD: 6.5 K/UL (ref 4.5–11.5)

## 2025-03-03 PROCEDURE — 85025 COMPLETE CBC W/AUTO DIFF WBC: CPT

## 2025-03-03 PROCEDURE — 80053 COMPREHEN METABOLIC PANEL: CPT

## 2025-07-01 ENCOUNTER — OFFICE VISIT (OUTPATIENT)
Age: 75
End: 2025-07-01
Payer: COMMERCIAL

## 2025-07-01 VITALS
SYSTOLIC BLOOD PRESSURE: 134 MMHG | TEMPERATURE: 97.2 F | DIASTOLIC BLOOD PRESSURE: 60 MMHG | HEART RATE: 58 BPM | RESPIRATION RATE: 16 BRPM | OXYGEN SATURATION: 99 %

## 2025-07-01 DIAGNOSIS — Z43.1 PEG (PERCUTANEOUS ENDOSCOPIC GASTROSTOMY) ADJUSTMENT/REPLACEMENT/REMOVAL (HCC): Primary | ICD-10-CM

## 2025-07-01 PROCEDURE — G8400 PT W/DXA NO RESULTS DOC: HCPCS | Performed by: SURGERY

## 2025-07-01 PROCEDURE — 1090F PRES/ABSN URINE INCON ASSESS: CPT | Performed by: SURGERY

## 2025-07-01 PROCEDURE — 1123F ACP DISCUSS/DSCN MKR DOCD: CPT | Performed by: SURGERY

## 2025-07-01 PROCEDURE — G8427 DOCREV CUR MEDS BY ELIG CLIN: HCPCS | Performed by: SURGERY

## 2025-07-01 PROCEDURE — 3017F COLORECTAL CA SCREEN DOC REV: CPT | Performed by: SURGERY

## 2025-07-01 PROCEDURE — 1036F TOBACCO NON-USER: CPT | Performed by: SURGERY

## 2025-07-01 PROCEDURE — 3075F SYST BP GE 130 - 139MM HG: CPT | Performed by: SURGERY

## 2025-07-01 PROCEDURE — G8417 CALC BMI ABV UP PARAM F/U: HCPCS | Performed by: SURGERY

## 2025-07-01 PROCEDURE — 3078F DIAST BP <80 MM HG: CPT | Performed by: SURGERY

## 2025-07-01 PROCEDURE — 99212 OFFICE O/P EST SF 10 MIN: CPT | Performed by: SURGERY

## 2025-07-01 RX ORDER — IPRATROPIUM BROMIDE AND ALBUTEROL SULFATE 2.5; .5 MG/3ML; MG/3ML
3 SOLUTION RESPIRATORY (INHALATION) EVERY 4 HOURS PRN
COMMUNITY

## 2025-07-01 RX ORDER — POLYETHYLENE GLYCOL 3350 17 G/17G
17 POWDER, FOR SOLUTION ORAL DAILY PRN
COMMUNITY

## 2025-07-01 NOTE — PROGRESS NOTES
Patient had PEG inserted back in January 2025.  She resides in a SNF.  She is eating.  There is no need for the PEG tube.    PEG tube was removed intact today.    Follow-up as needed.

## (undated) DEVICE — SINGLE USE BIOPSY VALVE MAJ-210: Brand: SINGLE USE BIOPSY VALVE (STERILE)

## (undated) DEVICE — SOLUTION IRRIG 500ML 0.9% SOD CHLO USP POUR PLAS BTL

## (undated) DEVICE — ADAPTER TBNG DIA15MM SWVL FBROPT BRONCHSCP TERM 2 AXIS PEEP

## (undated) DEVICE — SINGLE USE SUCTION VALVE MAJ-209: Brand: SINGLE USE SUCTION VALVE (STERILE)

## (undated) DEVICE — DOUBLE  SWIVEL ELBOW 15M - DOUBLE FLIP TOP CAP WITH SEAL - 22M/15F: Brand: DOUBLE  SWIVEL ELBOW 15M - DOUBLE FLIP TOP CAP WITH SEAL - 22M/15F

## (undated) DEVICE — AIR/WATER CLEANING ADAPTER FOR OLYMPUS® GI ENDOSCOPE: Brand: BULLDOG®

## (undated) DEVICE — BRONCHOSCOPY PACK: Brand: MEDLINE INDUSTRIES, INC.

## (undated) DEVICE — GAUZE,SPONGE,4"X4",8PLY,STRL,LF,10/TRAY: Brand: MEDLINE

## (undated) DEVICE — ENDOSCOPIC KIT 1.1+ OP4 NO CP DE

## (undated) DEVICE — BITEBLOCK 54FR W/ DENT RIM BLOX

## (undated) DEVICE — Device

## (undated) DEVICE — DEFENDO AIR WATER SUCTION AND BIOPSY VALVE KIT FOR  OLYMPUS: Brand: DEFENDO AIR/WATER/SUCTION AND BIOPSY VALVE